# Patient Record
Sex: FEMALE | Race: WHITE | NOT HISPANIC OR LATINO | Employment: FULL TIME | URBAN - METROPOLITAN AREA
[De-identification: names, ages, dates, MRNs, and addresses within clinical notes are randomized per-mention and may not be internally consistent; named-entity substitution may affect disease eponyms.]

---

## 2023-03-20 ENCOUNTER — TELEMEDICINE (OUTPATIENT)
Dept: PHARMACY | Facility: HOSPITAL | Age: 43
End: 2023-03-20
Payer: COMMERCIAL

## 2023-03-20 DIAGNOSIS — E66.9 CLASS 1 OBESITY WITHOUT SERIOUS COMORBIDITY WITH BODY MASS INDEX (BMI) OF 34.0 TO 34.9 IN ADULT, UNSPECIFIED OBESITY TYPE: Primary | ICD-10-CM

## 2023-03-20 DIAGNOSIS — E66.9 CLASS 1 OBESITY WITHOUT SERIOUS COMORBIDITY WITH BODY MASS INDEX (BMI) OF 34.0 TO 34.9 IN ADULT, UNSPECIFIED OBESITY TYPE: ICD-10-CM

## 2023-03-20 PROBLEM — E66.811 CLASS 1 OBESITY WITHOUT SERIOUS COMORBIDITY WITH BODY MASS INDEX (BMI) OF 34.0 TO 34.9 IN ADULT: Status: ACTIVE | Noted: 2023-03-20

## 2023-03-20 RX ORDER — SEMAGLUTIDE 0.5 MG/.5ML
0.5 INJECTION, SOLUTION SUBCUTANEOUS
COMMUNITY
Start: 2023-02-27 | End: 2023-03-20 | Stop reason: ALTCHOICE

## 2023-03-20 RX ORDER — DROSPIRENONE AND ETHINYL ESTRADIOL 0.03MG-3MG
1 KIT ORAL DAILY
COMMUNITY
Start: 2015-08-31 | End: 2023-12-05 | Stop reason: ALTCHOICE

## 2023-03-20 RX ORDER — SEMAGLUTIDE 1 MG/.5ML
1 INJECTION, SOLUTION SUBCUTANEOUS
Qty: 2 ML | Refills: 1 | Status: SHIPPED | OUTPATIENT
Start: 2023-03-20 | End: 2023-04-18 | Stop reason: ALTCHOICE

## 2023-03-20 RX ORDER — ASPIRIN 81 MG/1
1 TABLET ORAL DAILY
Status: ON HOLD | COMMUNITY
Start: 2017-12-20 | End: 2024-01-09 | Stop reason: SDUPTHER

## 2023-03-20 NOTE — PROGRESS NOTES
Subjective   Patient ID: Iris Barth is a 42 y.o. female who presents for Weight Loss.    Referring Provider: Roxann Basurto DO     Ms. Barth returns to the pharmacy team for weight loss medication review. At initial pharmacy visit, patient was a candidate for Wegovy therapy after insurance approved prior authorization and review of her vitals showed BMI > 30. Patient has been able to tolerate 2 weeks of Wegovy 0.5 mg without side effects or concerns, due for 3rd dose tomorrow. She has noticed 18 lbs of weight loss since starting Wegovy. She is requesting further titration today to the 1 mg weekly dose. She is concerned about supply of medication and inquires to get RX sooner if possible. No other concerns at this time.     Review of Systems   All other systems reviewed and are negative.      Objective     There were no vitals taken for this visit.     Labs  Lab Results   Component Value Date    BILITOT 0.3 02/06/2023    CALCIUM 8.9 02/06/2023    CO2 22 02/06/2023     02/06/2023    CREATININE 0.74 02/06/2023    GLUCOSE 90 02/06/2023    ALKPHOS 59 02/06/2023    K 3.8 02/06/2023    PROT 6.9 02/06/2023     02/06/2023    AST 13 02/06/2023    ALT 8 02/06/2023    BUN 13 02/06/2023    ANIONGAP 14 02/06/2023    ALBUMIN 3.9 02/06/2023    GFRF >90 02/06/2023     Lab Results   Component Value Date    TRIG 107 02/06/2023    CHOL 216 (H) 02/06/2023    HDL 88.8 02/06/2023     Lab Results   Component Value Date    HGBA1C 5.1 02/06/2023       Assessment/Plan   Problem List Items Addressed This Visit          Endocrine/Metabolic    Class 1 obesity without serious comorbidity with body mass index (BMI) of 34.0 to 34.9 in adult   1. Increase taking Wegovy to 1 mg SQ once weekly   2. Continue all other meds as prescribed  3. Pharmacy to reach out again in 4 weeks for dose titration and tolerance to therapy       Clinical Pharmacist Follow-Up Date: In 4 weeks or as needed for clinical intervention   Type of  encounter: Virtual     Provided counseling on lifestyle modifications, medications, and self-monitoring. Patient has no additional questions at this time. PCP was tasked if any medication or lab changes/recommendations need made.     Terrence Olvera, PharmD, BCPS

## 2023-04-18 ENCOUNTER — TELEMEDICINE (OUTPATIENT)
Dept: PHARMACY | Facility: HOSPITAL | Age: 43
End: 2023-04-18
Payer: COMMERCIAL

## 2023-04-18 DIAGNOSIS — E66.9 CLASS 1 OBESITY WITHOUT SERIOUS COMORBIDITY WITH BODY MASS INDEX (BMI) OF 34.0 TO 34.9 IN ADULT, UNSPECIFIED OBESITY TYPE: ICD-10-CM

## 2023-04-18 RX ORDER — SEMAGLUTIDE 1.7 MG/.75ML
1.7 INJECTION, SOLUTION SUBCUTANEOUS
Qty: 2 ML | Refills: 2 | Status: SHIPPED | OUTPATIENT
Start: 2023-04-18 | End: 2023-05-16 | Stop reason: ALTCHOICE

## 2023-04-18 NOTE — PROGRESS NOTES
Subjective   Patient ID: Iris Barth is a 42 y.o. female who presents for Weight Loss.    Referring Provider: Roxann Basurto DO     Ms. Barth returns to the pharmacy team for weight loss medication review. At initial pharmacy visit, patient was a candidate for Wegovy therapy after insurance approved prior authorization and review of her vitals showed BMI > 30. Patient has been able to tolerate 2 weeks of Wegovy 1 mg without side effects or concerns, due for 3rd dose tomorrow. She has noticed 18+ lbs of weight loss since starting Wegovy. She is requesting further titration today to the 1.7 mg weekly dose. She is concerned about supply of medication and inquires to get RX sooner if possible. No other concerns at this time.     Objective     There were no vitals taken for this visit.     Labs  Lab Results   Component Value Date    BILITOT 0.3 02/06/2023    CALCIUM 8.9 02/06/2023    CO2 22 02/06/2023     02/06/2023    CREATININE 0.74 02/06/2023    GLUCOSE 90 02/06/2023    ALKPHOS 59 02/06/2023    K 3.8 02/06/2023    PROT 6.9 02/06/2023     02/06/2023    AST 13 02/06/2023    ALT 8 02/06/2023    BUN 13 02/06/2023    ANIONGAP 14 02/06/2023    ALBUMIN 3.9 02/06/2023    GFRF >90 02/06/2023     Lab Results   Component Value Date    TRIG 107 02/06/2023    CHOL 216 (H) 02/06/2023    HDL 88.8 02/06/2023     Lab Results   Component Value Date    HGBA1C 5.1 02/06/2023     Current Outpatient Medications on File Prior to Visit   Medication Sig Dispense Refill    aspirin 81 mg EC tablet Take 1 tablet (81 mg) by mouth once daily.      Ocella 3-0.03 mg tablet Take 1 tablet by mouth once daily.      [DISCONTINUED] semaglutide, weight loss, (Wegovy) 1 mg/0.5 mL pen injector Inject 1 mg under the skin every 7 days. 2 mL 1     No current facility-administered medications on file prior to visit.       Assessment/Plan   Problem List Items Addressed This Visit       Class 1 obesity without serious comorbidity with body  mass index (BMI) of 34.0 to 34.9 in adult   1. Increase taking Wegovy to 1.7 mg SQ once weekly   2. Continue all other meds as prescribed  3. Pharmacy to reach out again in 4 weeks for dose titration and tolerance to therapy         Clinical Pharmacist Follow-Up Date: In 4 weeks or as needed for clinical intervention   Type of encounter: Virtual      Provided counseling on lifestyle modifications, medications, and self-monitoring. Patient has no additional questions at this time. PCP was tasked if any medication or lab changes/recommendations need made.      Terrence Olvera, PharmD, BCPS

## 2023-05-16 ENCOUNTER — TELEMEDICINE (OUTPATIENT)
Dept: PHARMACY | Facility: HOSPITAL | Age: 43
End: 2023-05-16
Payer: COMMERCIAL

## 2023-05-16 DIAGNOSIS — E66.9 CLASS 1 OBESITY WITHOUT SERIOUS COMORBIDITY WITH BODY MASS INDEX (BMI) OF 34.0 TO 34.9 IN ADULT, UNSPECIFIED OBESITY TYPE: ICD-10-CM

## 2023-05-16 RX ORDER — SEMAGLUTIDE 2.4 MG/.75ML
2.4 INJECTION, SOLUTION SUBCUTANEOUS
Qty: 6 ML | Refills: 0 | Status: SHIPPED | OUTPATIENT
Start: 2023-05-16 | End: 2023-06-13 | Stop reason: SDUPTHER

## 2023-05-16 NOTE — PROGRESS NOTES
Subjective   Patient ID: Iris Barth is a 42 y.o. female who presents for No chief complaint on file..    Referring Provider: Roxann Basurto DO     Ms. Barth returns to the pharmacy team for weight loss medication review. At initial pharmacy visit, patient was a candidate for Wegovy therapy after insurance approved prior authorization and review of her vitals showed BMI > 30. Patient has been able to tolerate 2 weeks of Wegovy 1.7 mg, this time felt mild nausea for one week but resolved and now is without side effects or concerns, due for 3rd dose tomorrow. She has noticed 30 lbs of weight loss since starting Wegovy. She is requesting further titration today to the 2.4 mg weekly dose. She is concerned about supply of medication and inquires to get RX sooner if possible. No other concerns at this time.      Objective     There were no vitals taken for this visit.     Labs  Lab Results   Component Value Date    BILITOT 0.3 02/06/2023    CALCIUM 8.9 02/06/2023    CO2 22 02/06/2023     02/06/2023    CREATININE 0.74 02/06/2023    GLUCOSE 90 02/06/2023    ALKPHOS 59 02/06/2023    K 3.8 02/06/2023    PROT 6.9 02/06/2023     02/06/2023    AST 13 02/06/2023    ALT 8 02/06/2023    BUN 13 02/06/2023    ANIONGAP 14 02/06/2023    ALBUMIN 3.9 02/06/2023    GFRF >90 02/06/2023     Lab Results   Component Value Date    TRIG 107 02/06/2023    CHOL 216 (H) 02/06/2023    HDL 88.8 02/06/2023     Lab Results   Component Value Date    HGBA1C 5.1 02/06/2023       Current Outpatient Medications on File Prior to Visit   Medication Sig Dispense Refill    aspirin 81 mg EC tablet Take 1 tablet (81 mg) by mouth once daily.      Ocella 3-0.03 mg tablet Take 1 tablet by mouth once daily.      semaglutide, weight loss, (Wegovy) 1.7 mg/0.75 mL pen injector Inject 1.7 mg under the skin every 7 days. 2 mL 2     No current facility-administered medications on file prior to visit.        Assessment/Plan   Problem List Items  Addressed This Visit       Class 1 obesity without serious comorbidity with body mass index (BMI) of 34.0 to 34.9 in adult   1. Increase taking Wegovy to 2.4 mg SQ once weekly   2. Continue all other meds as prescribed  3. Pharmacy to reach out again in 4 weeks for tolerance to therapy         Clinical Pharmacist Follow-Up Date: In 4 weeks or as needed for clinical intervention   Type of encounter: Virtual      Provided counseling on lifestyle modifications, medications, and self-monitoring. Patient has no additional questions at this time. PCP was tasked if any medication or lab changes/recommendations need made.      Terrence Olvera, PharmD, BCPS

## 2023-06-13 ENCOUNTER — TELEMEDICINE (OUTPATIENT)
Dept: PHARMACY | Facility: HOSPITAL | Age: 43
End: 2023-06-13
Payer: COMMERCIAL

## 2023-06-13 DIAGNOSIS — E66.9 CLASS 1 OBESITY WITHOUT SERIOUS COMORBIDITY WITH BODY MASS INDEX (BMI) OF 34.0 TO 34.9 IN ADULT, UNSPECIFIED OBESITY TYPE: Primary | ICD-10-CM

## 2023-06-13 DIAGNOSIS — E66.9 CLASS 1 OBESITY WITHOUT SERIOUS COMORBIDITY WITH BODY MASS INDEX (BMI) OF 34.0 TO 34.9 IN ADULT, UNSPECIFIED OBESITY TYPE: ICD-10-CM

## 2023-06-13 RX ORDER — SEMAGLUTIDE 2.4 MG/.75ML
2.4 INJECTION, SOLUTION SUBCUTANEOUS
Qty: 9 ML | Refills: 1 | Status: SHIPPED | OUTPATIENT
Start: 2023-06-13 | End: 2023-08-14 | Stop reason: SDUPTHER

## 2023-06-13 NOTE — PROGRESS NOTES
Subjective   Patient ID: Iris Barth is a 42 y.o. female who presents for Weight Loss.    Referring Provider: Roxann Basurto DO     Ms. Barth returns to the pharmacy team for weight loss medication review. At initial pharmacy visit, patient was a candidate for Wegovy therapy after insurance approved prior authorization and review of her vitals showed BMI > 30. Patient has been able to tolerate 2 weeks of Wegovy 2.4 mg, due for 3rd dose tomorrow. She has noticed 30 lbs of weight loss since starting Wegovy. She is requesting further continuation today of the 2.4 mg weekly dose. She is concerned about supply of medication and inquires to get RX sooner if possible. No other concerns at this time.         Objective     There were no vitals taken for this visit.     Labs  Lab Results   Component Value Date    BILITOT 0.3 02/06/2023    CALCIUM 8.9 02/06/2023    CO2 22 02/06/2023     02/06/2023    CREATININE 0.74 02/06/2023    GLUCOSE 90 02/06/2023    ALKPHOS 59 02/06/2023    K 3.8 02/06/2023    PROT 6.9 02/06/2023     02/06/2023    AST 13 02/06/2023    ALT 8 02/06/2023    BUN 13 02/06/2023    ANIONGAP 14 02/06/2023    ALBUMIN 3.9 02/06/2023    GFRF >90 02/06/2023     Lab Results   Component Value Date    TRIG 107 02/06/2023    CHOL 216 (H) 02/06/2023    HDL 88.8 02/06/2023     Lab Results   Component Value Date    HGBA1C 5.1 02/06/2023       Current Outpatient Medications on File Prior to Visit   Medication Sig Dispense Refill    aspirin 81 mg EC tablet Take 1 tablet (81 mg) by mouth once daily.      Ocella 3-0.03 mg tablet Take 1 tablet by mouth once daily.      semaglutide, weight loss, (Wegovy) 2.4 mg/0.75 mL pen injector Inject 2.4 mg under the skin every 7 days. 6 mL 0     No current facility-administered medications on file prior to visit.        Assessment/Plan   Problem List Items Addressed This Visit       Class 1 obesity without serious comorbidity with body mass index (BMI) of 34.0 to 34.9  in adult   1. Continue taking Wegovy 2.4 mg SQ once weekly   2. Continue all other meds as prescribed  3. Pharmacy to reach out as needed for therapy questions         Clinical Pharmacist Follow-Up Date: prn  Type of encounter: Virtual      Provided counseling on lifestyle modifications, medications, and self-monitoring. Patient has no additional questions at this time. PCP was tasked if any medication or lab changes/recommendations need made.      Terrence Olvera, PharmD, BCPS

## 2023-08-14 ENCOUNTER — TELEMEDICINE (OUTPATIENT)
Dept: PHARMACY | Facility: HOSPITAL | Age: 43
End: 2023-08-14
Payer: COMMERCIAL

## 2023-08-14 DIAGNOSIS — E66.9 CLASS 1 OBESITY WITHOUT SERIOUS COMORBIDITY WITH BODY MASS INDEX (BMI) OF 34.0 TO 34.9 IN ADULT, UNSPECIFIED OBESITY TYPE: Primary | ICD-10-CM

## 2023-08-14 DIAGNOSIS — E66.9 CLASS 1 OBESITY WITHOUT SERIOUS COMORBIDITY WITH BODY MASS INDEX (BMI) OF 34.0 TO 34.9 IN ADULT, UNSPECIFIED OBESITY TYPE: ICD-10-CM

## 2023-08-14 RX ORDER — SEMAGLUTIDE 2.4 MG/.75ML
2.4 INJECTION, SOLUTION SUBCUTANEOUS
Qty: 9 ML | Refills: 1 | Status: SHIPPED | OUTPATIENT
Start: 2023-08-14 | End: 2023-12-11 | Stop reason: SDUPTHER

## 2023-08-14 NOTE — PROGRESS NOTES
"Subjective   Patient ID: Iris Barth is a 42 y.o. female who presents for No chief complaint on file..    Referring Provider: Roxann Basurto DO     Ms. Barth returns to the pharmacy team for weight loss medication review. At initial pharmacy visit, patient was a candidate for Wegovy therapy after insurance approved prior authorization and review of her vitals showed BMI > 30. Patient has been able to tolerate Wegovy 2.4 mg for the past few months. She has noticed 40 lbs of weight loss since starting Wegovy. She is requesting further continuation today of the 2.4 mg weekly dose and PA renewal. No other concerns at this time.         Objective         2/6/2023     8:38 AM   Vitals   Systolic 152   Diastolic 78   Heart Rate 80   Height (in) 1.651 m (5' 5\")   Weight (lb) 210.4   BMI 35.01 kg/m2   BSA (m2) 2.09 m2         Labs  Lab Results   Component Value Date    BILITOT 0.3 02/06/2023    CALCIUM 8.9 02/06/2023    CO2 22 02/06/2023     02/06/2023    CREATININE 0.74 02/06/2023    GLUCOSE 90 02/06/2023    ALKPHOS 59 02/06/2023    K 3.8 02/06/2023    PROT 6.9 02/06/2023     02/06/2023    AST 13 02/06/2023    ALT 8 02/06/2023    BUN 13 02/06/2023    ANIONGAP 14 02/06/2023    ALBUMIN 3.9 02/06/2023    GFRF >90 02/06/2023     Lab Results   Component Value Date    TRIG 107 02/06/2023    CHOL 216 (H) 02/06/2023    HDL 88.8 02/06/2023     Lab Results   Component Value Date    HGBA1C 5.1 02/06/2023       Current Outpatient Medications on File Prior to Visit   Medication Sig Dispense Refill    aspirin 81 mg EC tablet Take 1 tablet (81 mg) by mouth once daily.      Ocella 3-0.03 mg tablet Take 1 tablet by mouth once daily.      semaglutide, weight loss, (Wegovy) 2.4 mg/0.75 mL pen injector Inject 2.4 mg under the skin every 7 days. 9 mL 1     No current facility-administered medications on file prior to visit.        Assessment/Plan   Problem List Items Addressed This Visit       Class 1 obesity without " serious comorbidity with body mass index (BMI) of 34.0 to 34.9 in adult   1. Continue taking Wegovy 2.4 mg SQ once weekly   2. Continue all other meds as prescribed  3. Pharmacy to reach out as needed for therapy questions   4. Prior Auth submitted 8/14 and approved until 3/11/2024   5. Refill for wegovy submitted today        Clinical Pharmacist Follow-Up Date: prn  Type of encounter: Virtual      Provided counseling on lifestyle modifications, medications, and self-monitoring. Patient has no additional questions at this time. PCP was tasked if any medication or lab changes/recommendations need made.      Terrence Olvera, PharmD, BCPS

## 2023-10-20 ENCOUNTER — ANCILLARY PROCEDURE (OUTPATIENT)
Dept: RADIOLOGY | Facility: CLINIC | Age: 43
End: 2023-10-20
Payer: COMMERCIAL

## 2023-10-20 DIAGNOSIS — Z12.31 ENCOUNTER FOR SCREENING MAMMOGRAM FOR MALIGNANT NEOPLASM OF BREAST: ICD-10-CM

## 2023-10-20 DIAGNOSIS — R92.8 ABNORMAL MAMMOGRAM OF BOTH BREASTS: ICD-10-CM

## 2023-10-20 PROCEDURE — 77063 BREAST TOMOSYNTHESIS BI: CPT

## 2023-10-20 PROCEDURE — 77067 SCR MAMMO BI INCL CAD: CPT | Mod: BILATERAL PROCEDURE | Performed by: RADIOLOGY

## 2023-10-20 PROCEDURE — 77063 BREAST TOMOSYNTHESIS BI: CPT | Mod: BILATERAL PROCEDURE | Performed by: RADIOLOGY

## 2023-10-30 ENCOUNTER — TELEMEDICINE (OUTPATIENT)
Dept: PRIMARY CARE | Facility: CLINIC | Age: 43
End: 2023-10-30
Payer: COMMERCIAL

## 2023-10-30 DIAGNOSIS — R92.8 ABNORMAL MAMMOGRAM: Primary | ICD-10-CM

## 2023-10-30 PROBLEM — M25.569 ARTHRALGIA OF LOWER LEG: Status: ACTIVE | Noted: 2021-09-30

## 2023-10-30 PROBLEM — B34.9 ACUTE VIRAL SYNDROME: Status: ACTIVE | Noted: 2023-10-30

## 2023-10-30 PROBLEM — F41.9 ANXIETY: Status: ACTIVE | Noted: 2023-10-30

## 2023-10-30 PROBLEM — M22.40 CHONDROMALACIA OF PATELLA: Status: ACTIVE | Noted: 2018-04-25

## 2023-10-30 PROBLEM — J10.1 INFLUENZA A: Status: ACTIVE | Noted: 2023-10-30

## 2023-10-30 PROBLEM — J01.10 ACUTE FRONTAL SINUSITIS: Status: ACTIVE | Noted: 2023-10-30

## 2023-10-30 PROBLEM — M25.569 KNEE PAIN: Status: ACTIVE | Noted: 2019-01-29

## 2023-10-30 PROCEDURE — 99214 OFFICE O/P EST MOD 30 MIN: CPT | Performed by: FAMILY MEDICINE

## 2023-10-30 ASSESSMENT — ENCOUNTER SYMPTOMS
COLOR CHANGE: 0
APPETITE CHANGE: 0
FATIGUE: 0
HEADACHES: 0
CHEST TIGHTNESS: 0
ACTIVITY CHANGE: 0
FEVER: 0
COUGH: 0
FACIAL ASYMMETRY: 0
DIZZINESS: 0
BACK PAIN: 0
LIGHT-HEADEDNESS: 0
ARTHRALGIAS: 0
PALPITATIONS: 0
CHOKING: 0

## 2023-10-30 NOTE — PROGRESS NOTES
Subjective   Patient ID: Iris Barth is a 43 y.o. female who presents for To discuss recent mammogram results. .    HPI     Review of Systems   Constitutional:  Negative for activity change, appetite change, fatigue and fever.   HENT:  Negative for congestion.    Respiratory:  Negative for cough, choking and chest tightness.    Cardiovascular:  Negative for chest pain, palpitations and leg swelling.   Musculoskeletal:  Negative for arthralgias, back pain and gait problem.   Skin:  Negative for color change and pallor.   Neurological:  Negative for dizziness, facial asymmetry, light-headedness and headaches.       Objective   There were no vitals taken for this visit.  BSA There is no height or weight on file to calculate BSA.      Physical Exam  Constitutional:       General: She is not in acute distress.     Appearance: Normal appearance. She is not toxic-appearing.   HENT:      Head: Normocephalic.      Right Ear: Tympanic membrane, ear canal and external ear normal.      Left Ear: Tympanic membrane, ear canal and external ear normal.      Nose: Nose normal.      Mouth/Throat:      Pharynx: Oropharynx is clear.   Eyes:      Conjunctiva/sclera: Conjunctivae normal.      Pupils: Pupils are equal, round, and reactive to light.   Cardiovascular:      Rate and Rhythm: Normal rate and regular rhythm.      Pulses: Normal pulses.      Heart sounds: Normal heart sounds.   Pulmonary:      Effort: No respiratory distress.      Breath sounds: No wheezing, rhonchi or rales.   Abdominal:      General: Bowel sounds are normal. There is no distension.      Palpations: Abdomen is soft.      Tenderness: There is no abdominal tenderness.   Musculoskeletal:         General: No swelling or tenderness.      Cervical back: No tenderness.   Skin:     Findings: No lesion or rash.   Neurological:      General: No focal deficit present.      Mental Status: She is alert and oriented to person, place, and time. Mental status is at  baseline.      Gait: Gait normal.   Psychiatric:         Mood and Affect: Mood normal.         Behavior: Behavior normal.         Thought Content: Thought content normal.         Judgment: Judgment normal.       [facility] Encounter on 02/06/2023   Component Date Value Ref Range Status    Cholesterol 02/06/2023 216 (H)  0 - 199 mg/dL Final    Comment: .      AGE      DESIRABLE   BORDERLINE HIGH   HIGH     0-19 Y     0 - 169       170 - 199     >/= 200    20-24 Y     0 - 189       190 - 224     >/= 225         >24 Y     0 - 199       200 - 239     >/= 240   **All ranges are based on fasting samples. Specific   therapeutic targets will vary based on patient-specific   cardiac risk.  .   Pediatric guidelines reference:Pediatrics 2011, 128(S5).   Adult guidelines reference: NCEP ATPIII Guidelines,     PABLITO 2001, 258:2486-97  .   Venipuncture immediately after or during the    administration of Metamizole may lead to falsely   low results. Testing should be performed immediately   prior to Metamizole dosing.      HDL 02/06/2023 88.8  mg/dL Final    Comment: .      AGE      VERY LOW   LOW     NORMAL    HIGH       0-19 Y       < 35   < 40     40-45     ----    20-24 Y       ----   < 40       >45     ----      >24 Y       ----   < 40     40-60      >60  .      Cholesterol/HDL Ratio 02/06/2023 2.4   Final    Comment: REF VALUES  DESIRABLE  < 3.4  HIGH RISK  > 5.0      LDL 02/06/2023 106 (H)  0 - 99 mg/dL Final    Comment: .                           NEAR      BORD      AGE      DESIRABLE  OPTIMAL    HIGH     HIGH     VERY HIGH     0-19 Y     0 - 109     ---    110-129   >/= 130     ----    20-24 Y     0 - 119     ---    120-159   >/= 160     ----      >24 Y     0 -  99   100-129  130-159   160-189     >/=190  .      VLDL 02/06/2023 21  0 - 40 mg/dL Final    Triglycerides 02/06/2023 107  0 - 149 mg/dL Final    Comment: .      AGE      DESIRABLE   BORDERLINE HIGH   HIGH     VERY HIGH   0 D-90 D    19 - 174         ----          ----        ----  91 D- 9 Y     0 -  74        75 -  99     >/= 100      ----    10-19 Y     0 -  89        90 - 129     >/= 130      ----    20-24 Y     0 - 114       115 - 149     >/= 150      ----         >24 Y     0 - 149       150 - 199    200- 499    >/= 500  .   Venipuncture immediately after or during the    administration of Metamizole may lead to falsely   low results. Testing should be performed immediately   prior to Metamizole dosing.      TSH 02/06/2023 2.04  0.44 - 3.98 mIU/L Final    Comment:  TSH testing is performed using different testing    methodology at [facility] than at other    [facility]. Direct result comparisons should    only be made within the same method.      Vitamin D, 25-Hydroxy 02/06/2023 70  ng/mL Final    Comment: .  DEFICIENCY:         < 20   NG/ML  INSUFFICIENCY:      20-29  NG/ML  SUFFICIENCY:         NG/ML    THIS ASSAY ACCURATELY QUANTIFIES THE SUM OF  VITAMIN D3, 25-HYDROXY AND VIT D2,25-HYDROXY.      WBC 02/06/2023 7.0  4.4 - 11.3 x10E9/L Final    nRBC 02/06/2023 0.0  0.0 - 0.0 /100 WBC Final    RBC 02/06/2023 4.08  4.00 - 5.20 x10E12/L Final    Hemoglobin 02/06/2023 12.3  12.0 - 16.0 g/dL Final    Hematocrit 02/06/2023 37.0  36.0 - 46.0 % Final    MCV 02/06/2023 91  80 - 100 fL Final    MCHC 02/06/2023 33.2  32.0 - 36.0 g/dL Final    Platelets 02/06/2023 357  150 - 450 x10E9/L Final    RDW 02/06/2023 13.2  11.5 - 14.5 % Final    Neutrophils % 02/06/2023 58.1  40.0 - 80.0 % Final    Immature Granulocytes %, Automated 02/06/2023 0.1  0.0 - 0.9 % Final    Comment:  Immature Granulocyte Count (IG) includes promyelocytes,    myelocytes and metamyelocytes but does not include bands.   Percent differential counts (%) should be interpreted in the   context of the absolute cell counts (cells/L).      Lymphocytes % 02/06/2023 34.4  13.0 - 44.0 % Final    Monocytes % 02/06/2023 6.4  2.0 - 10.0 % Final    Eosinophils % 02/06/2023 0.7  0.0 - 6.0 % Final     Basophils % 02/06/2023 0.3  0.0 - 2.0 % Final    Neutrophils Absolute 02/06/2023 4.06  1.20 - 7.70 x10E9/L Final    Lymphocytes Absolute 02/06/2023 2.41  1.20 - 4.80 x10E9/L Final    Monocytes Absolute 02/06/2023 0.45  0.10 - 1.00 x10E9/L Final    Eosinophils Absolute 02/06/2023 0.05  0.00 - 0.70 x10E9/L Final    Basophils Absolute 02/06/2023 0.02  0.00 - 0.10 x10E9/L Final    Hemoglobin A1C 02/06/2023 5.1  % Final    Comment:      Diagnosis of Diabetes-Adults   Non-Diabetic: < or = 5.6%   Increased risk for developing diabetes: 5.7-6.4%   Diagnostic of diabetes: > or = 6.5%  .       Monitoring of Diabetes                Age (y)     Therapeutic Goal (%)   Adults:          >18           <7.0   Pediatrics:    13-18           <7.5                   7-12           <8.0                   0- 6            7.5-8.5   American Diabetes Association. Diabetes Care 33(S1), Jan 2010.      Estimated Average Glucose 02/06/2023 100  MG/DL Final    Glucose 02/06/2023 90  74 - 99 mg/dL Final    Sodium 02/06/2023 139  136 - 145 mmol/L Final    Potassium 02/06/2023 3.8  3.5 - 5.3 mmol/L Final    Chloride 02/06/2023 107  98 - 107 mmol/L Final    Bicarbonate 02/06/2023 22  21 - 32 mmol/L Final    Anion Gap 02/06/2023 14  10 - 20 mmol/L Final    Urea Nitrogen 02/06/2023 13  6 - 23 mg/dL Final    Creatinine 02/06/2023 0.74  0.50 - 1.05 mg/dL Final    GFR Female 02/06/2023 >90  >90 mL/min/1.73m2 Final    Comment:  CALCULATIONS OF ESTIMATED GFR ARE PERFORMED   USING THE 2021 CKD-EPI STUDY REFIT EQUATION   WITHOUT THE RACE VARIABLE FOR THE IDMS-TRACEABLE   CREATININE METHODS.    https://jasn.asnjournals.org/content/early/2021/09/22/ASN.8242680839      Calcium 02/06/2023 8.9  8.6 - 10.6 mg/dL Final    Albumin 02/06/2023 3.9  3.4 - 5.0 g/dL Final    Alkaline Phosphatase 02/06/2023 59  33 - 110 U/L Final    Total Protein 02/06/2023 6.9  6.4 - 8.2 g/dL Final    AST 02/06/2023 13  9 - 39 U/L Final    Total Bilirubin 02/06/2023 0.3  0.0 - 1.2 mg/dL  Final    ALT (SGPT) 02/06/2023 8  7 - 45 U/L Final    Comment:  Patients treated with Sulfasalazine may generate    falsely decreased results for ALT.       Current Outpatient Medications on File Prior to Visit   Medication Sig Dispense Refill    aspirin 81 mg EC tablet Take 1 tablet (81 mg) by mouth once daily.      Ocella 3-0.03 mg tablet Take 1 tablet by mouth once daily.      semaglutide, weight loss, (Wegovy) 2.4 mg/0.75 mL pen injector Inject 2.4 mg under the skin every 7 days. 9 mL 1     No current facility-administered medications on file prior to visit.     No images are attached to the encounter.            Assessment/Plan   Diagnoses and all orders for this visit:  Abnormal mammogram    Patient to follow up with the US  Patient to call if questions or concerns

## 2023-11-03 ENCOUNTER — ANCILLARY PROCEDURE (OUTPATIENT)
Dept: RADIOLOGY | Facility: CLINIC | Age: 43
End: 2023-11-03
Payer: COMMERCIAL

## 2023-11-03 DIAGNOSIS — R92.8 ABNORMAL MAMMOGRAM OF BOTH BREASTS: ICD-10-CM

## 2023-11-03 PROCEDURE — 76642 ULTRASOUND BREAST LIMITED: CPT | Mod: BILATERAL PROCEDURE | Performed by: RADIOLOGY

## 2023-11-03 PROCEDURE — 76642 ULTRASOUND BREAST LIMITED: CPT | Mod: 50

## 2023-11-07 PROBLEM — N63.20 MASS OF LEFT BREAST: Status: ACTIVE | Noted: 2023-11-07

## 2023-11-07 PROBLEM — N63.10 MASS OF RIGHT BREAST: Status: ACTIVE | Noted: 2023-11-07

## 2023-11-07 PROBLEM — R92.8 ABNORMAL FINDING ON BREAST IMAGING: Status: ACTIVE | Noted: 2023-11-07

## 2023-11-07 NOTE — PROGRESS NOTES
Iris Barth female   1980 43 y.o.  [id number]      Chief Complaint  New patient, biopsy consultation.    History Of Present Illness  Iris Barth is a very pleasant 43 y.o.  female seen in the breast center for biopsy consultation. She denies breast surgery or biopsy. She has family history of breast cancer in her maternal grandmother.    BREAST IMAGIN/3/2023 Bilateral breast ultrasound, indicates BI-RADS Category 4. Left breast suspicious masses, bilateral indeterminate masses, and indeterminate left axillary lymph nodes. Further ultrasound examination with elastography of bilateral breasts as well as ultrasound guided core needle biopsy are recommended.    REPRODUCTIVE HISTORY: menarche age 12, , first birth age 25,  x 1 year, OCP's x 12 years, premenopausal, LMP no periods on continual birth control, scattered fibroglandular tissue                                FAMILY CANCER HISTORY:   Maternal Grandmother: Breast cancer, unknown age  Maternal Grandfather: Unknown cancer    Review of Systems  Constitutional:  Negative for appetite change, fatigue, fever and unexpected weight change.   HENT:  Negative for ear pain, hearing loss, nosebleeds, sore throat and trouble swallowing.    Eyes:  Negative for discharge, itching and visual disturbance.   Breast: As stated in HPI.  Respiratory:  Negative for cough, chest tightness and shortness of breath.    Cardiovascular:  Negative for chest pain, palpitations and leg swelling.   Gastrointestinal:  Negative for abdominal pain, constipation, diarrhea and nausea.   Endocrine: Negative for cold intolerance and heat intolerance.   Genitourinary:  Negative for dysuria, frequency, hematuria, pelvic pain and vaginal bleeding.   Musculoskeletal:  Negative for arthralgias, back pain, gait problem, joint swelling and myalgias.   Skin:  Negative for color change and rash.   Allergic/Immunologic: Negative for environmental allergies and food  allergies.   Neurological:  Negative for dizziness, tremors, speech difficulty, weakness, numbness and headaches.   Hematological:  Does not bruise/bleed easily.   Psychiatric/Behavioral:  Negative for agitation, dysphoric mood and sleep disturbance. The patient is not nervous/anxious.       Past Medical History  She has no past medical history on file.    Surgical History  She has a past surgical history that includes Knee surgery (08/31/2015).    Family History  Cancer-related family history includes Breast cancer in her maternal grandmother and paternal grandmother.     Social History  She reports that she has never smoked. She has never been exposed to tobacco smoke. She has never used smokeless tobacco. Alcohol use questions deferred to the physician. Drug use questions deferred to the physician.    Allergies  Patient has no known allergies.    Medications  Current Outpatient Medications   Medication Instructions    aspirin 81 mg EC tablet 1 tablet, oral, Daily    Ocella 3-0.03 mg tablet 1 tablet, oral, Daily    Wegovy 2.4 mg, subcutaneous, Every 7 days       Last Recorded Vitals  Blood pressure (!) 145/95, pulse 87, temperature 36 °C (96.8 °F), weight 75.8 kg (167 lb).    Physical Exam  Patient is alert and oriented x3 and in a relaxed and appropriate mood. Her gait is steady and hand grasps are equal. Sclera is clear. The breasts are nearly symmetrical. The tissue is soft without palpable abnormalities, discrete nodules or masses. The skin and nipples appear normal. There is no cervical, supraclavicular or axillary lymphadenopathy. Heart rate and rhythm normal, S1 and S2 appreciated. The lungs are clear to auscultation bilaterally. Abdomen is soft and non-tender.      Relevant Results and Imaging  Study Result    Narrative & Impression   Interpreted By:  Shayla Barrera,   STUDY:  BI US BREAST LIMITED BILATERAL;  11/3/2023 9:18 am      ACCESSION NUMBER(S):  [id number]      ORDERING CLINICIAN:  QUINTON  BURNS      INDICATION:  Bilateral masses on screening mammogram.      COMPARISON:  10/20/2023      FINDINGS:  Targeted ultrasound examination of bilateral breasts was performed.      Within the right breast, at 12:00 position 6 cm from nipple, an  indeterminate heterogeneous mass measuring 0.5 x 0.5 x 0.4 cm is  identified. There is no internal vascularity. This correlates with  the right mammographic finding.      Within the left breast, at 2:00 position 8 cm from nipple, an  indeterminate, partially circumscribed parallel hypoechoic mass  measuring 0.4 x 0.3 x 0.2 cm is identified. It demonstrates no  intervascular D. at 11:00 position 7 cm from pole, a suspicious  irregular hypoechoic mass measuring 0.9 x 0.4 x 0.4 cm is identified.  It demonstrates no internal vascularity. At 1:00 position 7 cm from  the nipple, a suspicious irregular non parallel shadowing mass is  identified, measuring 0.4 x 0.3 x 0.3 cm. These correlate with the  left mammographic findings.      Ultrasound examination of the left axilla demonstrates 3 borderline  lymph lymph nodes with cortex measuring approximately 0.3 cm in  thickness.      IMPRESSION:  Left breast suspicious masses, bilateral indeterminate masses, and  indeterminate left axillary lymph nodes. Further ultrasound  examination with elastography of bilateral breasts as well as  surgical consultation and ultrasound guided core needle biopsy are  recommended. Findings and recommendations were discussed with the  patient over the telephone by Dr. Barrera. A preprocedure form  was completed.      BI-RADS CATEGORY:      BI-RADS Category:  4 Suspicious.  Recommendation:  Biopsy.  Recommended Date:  Immediate.  Laterality:  Bilateral.         Signed by: Shayla Barrera 11/3/2023 5:31 PM     Time was spent viewing digital images. I explained the results in depth, along with suggested explanation for follow up recommendations based on the testing results. BI-RADS Category  4    Visit Diagnosis  1. Abnormal finding on breast imaging        2. Mass of right breast, unspecified quadrant        3. Mass of left breast, unspecified quadrant            Assessment/Plan  Abnormal mammogram, bilateral breast masses, borderline left axillary lymphadenopathy, no breast surgery or biopsy, no family history of breast cancer, scattered fibroglandular tissue    Plan:  Bilateral breast repeat ultrasound with left breast and axillary ultrasound guided core biopsy with possible right breast ultrasound guided core biopsy.    Patient Discussion/Summary  Bilateral breast repeat ultrasound with left breast and axillary ultrasound guided core biopsy with possible right breast ultrasound guided core biopsy. A breast radiology physician will perform the procedure. Possible diagnoses include benign, atypia or cancer. Bruising and mild discomfort after the biopsy is normal and will improve. I typically have results in 5-7 business days. I will call you with the results, please have your phone handy to take my call. I will provide recommendations for future follow up based on your biopsy results.     IMPORTANT INFORMATION REGARDING YOUR RESULTS    If you receive medical information from My [facility] Personal Health Record (online chart) your results will be released into your chart. This means you may view or see results of your biopsy or procedure before I contact you directly. If this occurs, please call the office and we will discuss your results over the phone.    You can see your health information, review clinical summaries from office visits & test results online when you follow your health with MY  Chart, a personal health record. To sign up go to [email or web address]. If you need assistance with signing up or trouble getting into your account call [facility] Patient Line 24/7 at [phone].    My office phone number is [phone]  if you need to get in touch with me or have additional questions  or concerns. Thank you for choosing [facility] and trusting me as your healthcare provider. I look forward to seeing you again at your next office visit. I am honored to be a provider on your health care team and I remain dedicated to helping you achieve your health goals.       Violeta Schroeder, CARMELITA-CNP

## 2023-11-08 PROBLEM — E66.9 OBESITY: Status: ACTIVE | Noted: 2023-11-08

## 2023-11-08 RX ORDER — SEMAGLUTIDE 0.5 MG/.5ML
0.5 INJECTION, SOLUTION SUBCUTANEOUS
COMMUNITY
End: 2023-11-09 | Stop reason: ALTCHOICE

## 2023-11-09 ENCOUNTER — APPOINTMENT (OUTPATIENT)
Dept: SURGICAL ONCOLOGY | Facility: HOSPITAL | Age: 43
End: 2023-11-09
Payer: COMMERCIAL

## 2023-11-09 ENCOUNTER — OFFICE VISIT (OUTPATIENT)
Dept: SURGICAL ONCOLOGY | Facility: HOSPITAL | Age: 43
End: 2023-11-09
Payer: COMMERCIAL

## 2023-11-09 ENCOUNTER — TELEPHONE (OUTPATIENT)
Dept: RADIOLOGY | Facility: CLINIC | Age: 43
End: 2023-11-09

## 2023-11-09 VITALS
SYSTOLIC BLOOD PRESSURE: 145 MMHG | TEMPERATURE: 96.8 F | BODY MASS INDEX: 27.79 KG/M2 | HEART RATE: 87 BPM | DIASTOLIC BLOOD PRESSURE: 95 MMHG | WEIGHT: 167 LBS

## 2023-11-09 DIAGNOSIS — N63.10 MASS OF RIGHT BREAST, UNSPECIFIED QUADRANT: ICD-10-CM

## 2023-11-09 DIAGNOSIS — R92.8 ABNORMAL FINDING ON BREAST IMAGING: Primary | ICD-10-CM

## 2023-11-09 DIAGNOSIS — N63.20 MASS OF LEFT BREAST, UNSPECIFIED QUADRANT: ICD-10-CM

## 2023-11-09 PROCEDURE — 3008F BODY MASS INDEX DOCD: CPT | Performed by: NURSE PRACTITIONER

## 2023-11-09 PROCEDURE — 99204 OFFICE O/P NEW MOD 45 MIN: CPT | Performed by: NURSE PRACTITIONER

## 2023-11-09 PROCEDURE — 99214 OFFICE O/P EST MOD 30 MIN: CPT | Performed by: NURSE PRACTITIONER

## 2023-11-09 ASSESSMENT — PAIN SCALES - GENERAL: PAINLEVEL: 0-NO PAIN

## 2023-11-10 ENCOUNTER — HOSPITAL ENCOUNTER (OUTPATIENT)
Dept: RADIOLOGY | Facility: HOSPITAL | Age: 43
Discharge: HOME | End: 2023-11-10
Payer: COMMERCIAL

## 2023-11-10 DIAGNOSIS — R92.8 ABNORMAL FINDING ON BREAST IMAGING: ICD-10-CM

## 2023-11-10 DIAGNOSIS — R92.8 OTHER ABNORMAL AND INCONCLUSIVE FINDINGS ON DIAGNOSTIC IMAGING OF BREAST: ICD-10-CM

## 2023-11-10 DIAGNOSIS — R92.8 ABNORMAL FINDING ON BREAST IMAGING: Primary | ICD-10-CM

## 2023-11-10 PROCEDURE — 2500000005 HC RX 250 GENERAL PHARMACY W/O HCPCS: Performed by: RADIOLOGY

## 2023-11-10 PROCEDURE — 19084 BX BREAST ADD LESION US IMAG: CPT | Mod: RIGHT SIDE | Performed by: RADIOLOGY

## 2023-11-10 PROCEDURE — 77061 BREAST TOMOSYNTHESIS UNI: CPT | Mod: RT

## 2023-11-10 PROCEDURE — 19282 PERQ DEVICE BREAST EA IMAG: CPT

## 2023-11-10 PROCEDURE — 77066 DX MAMMO INCL CAD BI: CPT | Mod: RIGHT SIDE | Performed by: RADIOLOGY

## 2023-11-10 PROCEDURE — 19081 BX BREAST 1ST LESION STRTCTC: CPT | Mod: RT

## 2023-11-10 PROCEDURE — 88305 TISSUE EXAM BY PATHOLOGIST: CPT | Performed by: PATHOLOGY

## 2023-11-10 PROCEDURE — 2720000007 HC OR 272 NO HCPCS

## 2023-11-10 PROCEDURE — 19081 BX BREAST 1ST LESION STRTCTC: CPT | Mod: RIGHT SIDE | Performed by: RADIOLOGY

## 2023-11-10 PROCEDURE — 19083 BX BREAST 1ST LESION US IMAG: CPT | Mod: RIGHT SIDE | Performed by: RADIOLOGY

## 2023-11-10 PROCEDURE — 77065 DX MAMMO INCL CAD UNI: CPT | Mod: LT

## 2023-11-10 PROCEDURE — 76642 ULTRASOUND BREAST LIMITED: CPT | Mod: 50

## 2023-11-10 PROCEDURE — 88360 TUMOR IMMUNOHISTOCHEM/MANUAL: CPT | Mod: TC,SUR | Performed by: NURSE PRACTITIONER

## 2023-11-10 PROCEDURE — 2780000003 HC OR 278 NO HCPCS

## 2023-11-10 PROCEDURE — 76642 ULTRASOUND BREAST LIMITED: CPT | Mod: BILATERAL PROCEDURE | Performed by: RADIOLOGY

## 2023-11-10 PROCEDURE — 88368 INSITU HYBRIDIZATION MANUAL: CPT | Performed by: PATHOLOGY

## 2023-11-10 PROCEDURE — A4648 IMPLANTABLE TISSUE MARKER: HCPCS

## 2023-11-10 PROCEDURE — 76982 USE 1ST TARGET LESION: CPT

## 2023-11-10 PROCEDURE — 88360 TUMOR IMMUNOHISTOCHEM/MANUAL: CPT | Performed by: PATHOLOGY

## 2023-11-10 PROCEDURE — 19083 BX BREAST 1ST LESION US IMAG: CPT | Mod: LT

## 2023-11-10 RX ADMIN — Medication 11 ML: at 11:40

## 2023-11-10 RX ADMIN — Medication 9.5 ML: at 12:50

## 2023-11-10 NOTE — Clinical Note
POSITIONED , LIODCAINE GIVEN 1140, PROCEDURE COMPLETED,1150, PRESSURE HELD FOR 30 MIN FOR SLOW CONTINUOUS OOZING, PT.. FEELING NAUSEATED, LIGHT HEADED LYING FLAT, ICE APPLIED, IMPROVE WITH LYING DOWN, AFTER 30 MIN NO FURTHER BLEEDING, STERI STRIPS AND DSD APPLIED TAKEN TO PRE RECOVERY FOR GINGER ALE AND CRACKERS  BEFORE STARTING ULTRASOUND PROCEDURE @125, DEBRIEF WITH NDOCTORS [id number], OUT OF ROOM FOR DOCTORS WAS 1155

## 2023-11-10 NOTE — DISCHARGE INSTRUCTIONS
AFTER THE TEST  A steri-strip and bandage will be placed over the incision. You may shower after 24 hours. Remove bandage after 24 hours. Remove bandage after the shower. Leave the steri-strips in place to fall off on their own. If after 1 week the steri-strips are still on, you may remove them. Avoid swimming or soaking in tub for 3 days.     You may have mild discomfort at the test site. If needed, you may take Tylenol (Acetaminophen) for pain. Please avoid taking NSAIDs, Motrin, Advil, Aleve, or ibuprofen for 24 hours following the biopsy. After 24 hours you may resume NSAIDSs.     If you take aspirin, Plavix, Coumadin, Xarelto or Eliquis please tell us. If these medications were stopped by your provider, please ask them when to resume.     You may have some tenderness, bruising or slight bleeding at the site. Please apply ice packs to the site for 15 minutes on and 15 minutes off for a 2 hour minimum.     Most people can return to their usual routine after the procedure. Avoid Strenuous activity for 24 hours.     Sleep in a bra the night after your biopsy. Continue to do so for comfort.     Call your doctor if you have any of the following symptoms :  Fever  Increased pain  Increased bleeding  Redness  Increased swelling  Yellowish drainage  Your doctor will get the biopsy results within 5 - 7 days. Call your doctor with any questions.     Patient education brochure and pain/comfort measures have been reviewed.   Phone number provided to contact Breast Center if problems arise.     Patient verbalized understanding of home going instructions.

## 2023-11-15 ENCOUNTER — TELEPHONE (OUTPATIENT)
Dept: SURGICAL ONCOLOGY | Facility: HOSPITAL | Age: 43
End: 2023-11-15
Payer: COMMERCIAL

## 2023-11-15 NOTE — TELEPHONE ENCOUNTER
Result Communication    Spoke with Iris Barth regarding breast biopsy results invasive ductal carcinoma. Report concordant. Patient called the office and has an appointment scheduled for surgical consultation with Dr. Rojas on Tuesday.      Resulted Orders   Surgical Pathology Exam   Result Value Ref Range    Case Report       Surgical Pathology                                Case: [id number]                                  Authorizing Provider:  TERESA Sales    Collected:           11/10/2023 1307              Ordering Location:     [facility]       Received:            11/10/2023 1719                                     Center                                                                       Pathologist:           Marina Mcintosh MD PhD                                                               Specimens:   A) - BREAST CORE BIOPSY LEFT, LEFT breast 1:00 7 sm FN                                              B) - BREAST CORE BIOPSY LEFT, LEFT breast 11:00 7 cm FN                                             C) - BREAST CORE BIOPSY RIGHT, RIGHT BREAST STEREOTACTIX VACUMM ASSISTED CORE BIOPSY                -MASS                                                                                      FINAL DIAGNOSIS       A. BREAST, LEFT, 1:00, 7 CM FN, CORE BIOPSY:   -- Invasive ductal carcinoma, grade 2, see note.    Note:  In this limited sample, the invasive carcinoma measures up to 6 mm in greatest dimension.  ER, PA and HER2 will be reported in an addendum.    B. BREAST, LEFT, 11:00, 7 CM FN, CORE BIOPSY:   -- Invasive ductal carcinoma, grade 2-3, see note.    Note:  In this limited sample, the invasive carcinoma measures up to 4 mm in greatest dimension.  ER, PA and HER2 will be reported in an addendum.    Selected slides from Part A and the Part B were [place] [facility] [place] by Tito MADSEN BREAST, RIGHT, CORE BIOPSY:  --  "Fibroadenomatoid nodule.              By the signature on this report, the individual or group listed as making the Final Interpretation/Diagnosis certifies that they have reviewed this case.       Clinical History       Ultrasound guided core biopsy LEFT breast 1:00 7 cm FN      Gross Description       A: Received in formalin, labeled with the patient´s name and hospital number and \"1:00 7 cm\", are multiple irregular/cylindrical segments of yellow-white fatty soft tissue aggregating to 2.6 x 0.3 x 0.3 cm.  The specimen is submitted in toto in one cassette.  [facility]    NOTE:  Ischemia time: Not provided.  This specimen was placed into formalin at: 11/10/23 13:07.  B: Received in formalin, labeled with the patient´s name and hospital number and \"11:00 7 cm\", are multiple irregular/cylindrical segments of yellow-white fatty soft tissue aggregating to 1.5 x 1.3 x 0.5 cm.  The specimen is submitted in toto in two cassettes.  [facility]    NOTE:  Ischemia time: Not Provided.  This specimen was placed into formalin at: 11/10/23  13:52.  C: Received in formalin, labeled with the patient´s name and hospital number and \"right breast mass\", are multiple irregular/cylindrical segments of yellow-white fatty soft tissue aggregating to 2.9 x 2.7 x 1.1 cm.  The specimen is submitted in toto in two cassettes.  [facility]    NOTE:  Ischemia time: Not provided.  This specimen was placed into formalin at: 11/10/23  13:52      Disclaimer       One or more of the reagents used to perform assays on this specimen MAY have contained components considered to be analyte specific reagents (ASR's).  ASR's have not been cleared or approved by the U.S. Food and Drug Administration.  These assays were developed and their performance characteristics determined by the Department of Pathology at [facility]. The FDA does not require this test to go through premarket FDA review. This test is used for clinical purposes. It " should not be regarded as investigational or for research. This laboratory is certified under the Clinical Laboratory Improvement Amendments (CLIA) as qualified to perform high complexity clinical laboratory testing.  The assays were performed with appropriate positive and negative controls which stained appropriately.         4:24 PM

## 2023-11-21 ENCOUNTER — OFFICE VISIT (OUTPATIENT)
Dept: SURGICAL ONCOLOGY | Facility: HOSPITAL | Age: 43
End: 2023-11-21
Payer: COMMERCIAL

## 2023-11-21 ENCOUNTER — OFFICE VISIT (OUTPATIENT)
Dept: SURGICAL ONCOLOGY | Facility: CLINIC | Age: 43
End: 2023-11-21
Payer: COMMERCIAL

## 2023-11-21 VITALS
BODY MASS INDEX: 27.46 KG/M2 | SYSTOLIC BLOOD PRESSURE: 147 MMHG | WEIGHT: 165 LBS | HEART RATE: 99 BPM | TEMPERATURE: 97.7 F | DIASTOLIC BLOOD PRESSURE: 101 MMHG

## 2023-11-21 DIAGNOSIS — Z80.3 FAMILY HISTORY OF BREAST CANCER: ICD-10-CM

## 2023-11-21 DIAGNOSIS — Z17.0 MALIGNANT NEOPLASM OF OVERLAPPING SITES OF LEFT BREAST IN FEMALE, ESTROGEN RECEPTOR POSITIVE (MULTI): ICD-10-CM

## 2023-11-21 DIAGNOSIS — N63.20 MASS OF LEFT BREAST, UNSPECIFIED QUADRANT: ICD-10-CM

## 2023-11-21 DIAGNOSIS — C50.812 MALIGNANT NEOPLASM OF OVERLAPPING SITES OF LEFT BREAST IN FEMALE, ESTROGEN RECEPTOR POSITIVE (MULTI): ICD-10-CM

## 2023-11-21 DIAGNOSIS — R92.8 ABNORMAL FINDING ON BREAST IMAGING: Primary | ICD-10-CM

## 2023-11-21 PROCEDURE — 1036F TOBACCO NON-USER: CPT | Performed by: SURGERY

## 2023-11-21 PROCEDURE — 99417 PROLNG OP E/M EACH 15 MIN: CPT | Performed by: SURGERY

## 2023-11-21 PROCEDURE — 3008F BODY MASS INDEX DOCD: CPT | Performed by: SURGERY

## 2023-11-21 PROCEDURE — 99215 OFFICE O/P EST HI 40 MIN: CPT | Performed by: SURGERY

## 2023-11-21 ASSESSMENT — ENCOUNTER SYMPTOMS
ENDOCRINE NEGATIVE: 1
HEMATOLOGIC/LYMPHATIC NEGATIVE: 1
EYES NEGATIVE: 1
MUSCULOSKELETAL NEGATIVE: 1
NEUROLOGICAL NEGATIVE: 1
SLEEP DISTURBANCE: 1
RESPIRATORY NEGATIVE: 1
MUSCULOSKELETAL NEGATIVE: 1
NEUROLOGICAL NEGATIVE: 1
HEMATOLOGIC/LYMPHATIC NEGATIVE: 1
GASTROINTESTINAL NEGATIVE: 1
CONSTITUTIONAL NEGATIVE: 1
ALLERGIC/IMMUNOLOGIC NEGATIVE: 1
GASTROINTESTINAL NEGATIVE: 1
CARDIOVASCULAR NEGATIVE: 1
ENDOCRINE NEGATIVE: 1
SLEEP DISTURBANCE: 1
ALLERGIC/IMMUNOLOGIC NEGATIVE: 1
EYES NEGATIVE: 1
CONSTITUTIONAL NEGATIVE: 1
CARDIOVASCULAR NEGATIVE: 1
RESPIRATORY NEGATIVE: 1

## 2023-11-21 ASSESSMENT — PAIN SCALES - GENERAL: PAINLEVEL: 0-NO PAIN

## 2023-11-21 NOTE — PROGRESS NOTES
Subjective   Patient ID: Iris Barth is a 43 y.o. female with left breast cancer.    HPI  Iris Barth is a very pleasant 43 y.o.  female referred by Violeta Schroeder CNP for newly diagnosed multicentric left breast invasive ductal carcinoma. ER+ (80-95%) SC+ (80-95%), HER2 pending, clinical [id number], clinical stage IA.     She denies previous breast surgery or biopsy.      BREAST IMAGIN/3/2023 Bilateral breast ultrasound, indicates BI-RADS Category 4. Left breast suspicious masses, bilateral indeterminate masses, and indeterminate left axillary lymph nodes. Further ultrasound examination with elastography of bilateral breasts as well as ultrasound guided core needle biopsy are recommended.    On 11/10/23, left breast core biopsy at 1:00, 7 cm FN showed invasive ductal carcinoma, grade 2, ER + (>95%), SC + (>95%) HER2 2+, dual DELMY pending. At  11:00, 7 CM FN, core biopsy showed invasive ductal carcinoma, grade 2-3 ER + (80%), SC + (80%), HER2 +, dual DELMY pending. Right breast core needle biopsy showed fibroadenoma.     She presents today for surgical consultation. She notes bilateral hematomas at core needle biopsy sites, resolving.      REPRODUCTIVE HISTORY: menarche age 12, , first birth age 25,  x 1 year, OCP's x 12 years, premenopausal, LMP no periods on continual birth control, scattered fibroglandular tissue                                FAMILY CANCER HISTORY:   Maternal Grandmother: Breast cancer, 54, recurrence at 70. .   Maternal Grandfather: Bone cancer age 71.     No past medical history on file.    Current Outpatient Medications on File Prior to Visit   Medication Sig Dispense Refill    aspirin 81 mg EC tablet Take 1 tablet (81 mg) by mouth once daily.      Ocella 3-0.03 mg tablet Take 1 tablet by mouth once daily.      semaglutide, weight loss, (Wegovy) 2.4 mg/0.75 mL pen injector Inject 2.4 mg under the skin every 7 days. 9 mL 1     No current  facility-administered medications on file prior to visit.       Review of Systems   Constitutional: Negative.    HENT: Negative.     Eyes: Negative.    Respiratory: Negative.     Cardiovascular: Negative.    Gastrointestinal: Negative.    Endocrine: Negative.    Genitourinary: Negative.    Musculoskeletal: Negative.    Skin: Negative.    Allergic/Immunologic: Negative.    Neurological: Negative.    Hematological: Negative.    Psychiatric/Behavioral:  Positive for sleep disturbance.         Anxiety    All other systems reviewed and are negative.      Objective   Physical Exam  General: Otherwise healthy appearing. No acute distress.     HEENT: Conjunctiva well-colored, sclera non-icteric. Neck supple, trachea midline. No lymphadenopathy or thyromegaly.     Cardiovascular: Regular rate and rhythm.    Respiratory: Clear to auscultation bilaterally.     Breast: Exam performed in the sitting and supine positions. Right breast: 1:00, 8 cm FN well healing core needle biopsy site with surrounding ecchymosis and hematoma. No skin or nipple changes. Left breast: 11:00, 11 cm FN well healing core needle biopsy site. 2:00, 6 cm FN well healing core needle biopsy site with surrounding ecchymosis and hematoma. No skin or nipple changes. Grade 2 Ptosis. 32 DD.     Abdomen: Soft, non-tender, non-distended.    Extremities: Atraumatic, no edema.     Neurologic: Motor and sensory grossly intact. Alert and oriented.     Lymphatic: No axillary, supraclavicular, or infraclavicular lymphadenopathy bilaterally.     Assessment/Plan   Today we reviewed your pertinent history, physical exam, imaging, and pathology. We discussed the natural history, prognosis, and treatment of breast cancer.     We discussed the surgical approach to early stage breast cancer and the equivalence in survival with lumpectomy plus radiation and mastectomy. Lumpectomy means removing the tumor with a rim of normal tissue around it to ensure that the margins are free  of cancer. There is a small chance of having a positive margin, which would require further surgery. Mastectomy means removing all the breast tissue, often including the nipple and areolar complex. If you have a mastectomy, we briefly discussed options for reconstruction, which would be done by a plastic surgeon. Although there are 2 cancers, they are both small, and 2 site lumpectomy would be very reasonable.     You indicated interest in bilateral mastectomy. We had a long discussion about the lack of survival benefit with contralateral prophylactic mastectomy and the small but superior recurrence and survival benefit with lumpectomy and radiation for young women with breast cancer. Further, I explained bilateral mastectomy would only be medically indicated if you had a genetic mutation, I.e. BRCA 1 or 2 mutation. Referral to genetics was placed. Options for reconstruction were discussed and referral to plastic surgery was arranged. We also discussed that you are not a candidate for nipple sparing approach.     I would recommend an MRI prior to surgery if you chose lumpectomy to confirm extent of disease.     We also need to evaluate the lymph nodes under the arm with a sentinel lymph node biopsy. A small amount of radiotracer and blue dye will be injected prior to surgery to identify the first several nodes that drain the breast tissue, and these will be removed to check for cancer cells.     The surgery itself is a same-day outpatient procedure. We reviewed the expectations for surgery and postoperative care, and this information was provided in writing. Following surgery, you will return to the office for a postoperative visit and to discuss your pathology results. We discussed the risks, benefits, and alternatives to surgery, including bleeding, infection, lymphedema (arm swelling), impaired wound healing, numbness, pain, and breast asymmetry.     We discussed the overall treatment of breast cancer, which may  include radiation, chemotherapy, and/or anti-estrogen medication.    At the end of our visit today, we agreed you would consider your options and let us know how you would like to proceed.    All questions were answered and we are in agreement with the following plan:  1. Please let us know at your earliest convenience how you would like to proceed with surgery. Return visit to be scheduled for consent and surgical teaching.   2. Referral to plastic surgery.   3. Referral to genetics for consultation.     If you have any questions or concerns, please call us at [phone] (option #2). I appreciate the opportunity to care for you, and I look forward to seeing you again soon.     Vaishnavi Rojas MD   Breast Surgical Oncology     **DISCLAIMER** Speech recognition software was used to create portions of this document. While an attempt at proofreading has been made, minor errors in transcription may be present.    Scribe Attestation  By signing my name below, IKaylah, Scribe   attest that this documentation has been prepared under the direction and in the presence of Vaishnavi Rojas MD.    Diagnoses and all orders for this visit:  Abnormal finding on breast imaging  Malignant neoplasm of overlapping sites of left breast in female, estrogen receptor positive (CMS/HCC)  Mass of left breast, unspecified quadrant  -     Referral to Genetics; Future  -     Referral to Plastic Surgery; Future  Family history of breast cancer

## 2023-11-22 PROBLEM — C50.812 MALIGNANT NEOPLASM OF OVERLAPPING SITES OF LEFT BREAST IN FEMALE, ESTROGEN RECEPTOR POSITIVE (MULTI): Status: ACTIVE | Noted: 2023-11-22

## 2023-11-22 PROBLEM — Z17.0 MALIGNANT NEOPLASM OF OVERLAPPING SITES OF LEFT BREAST IN FEMALE, ESTROGEN RECEPTOR POSITIVE (MULTI): Status: ACTIVE | Noted: 2023-11-22

## 2023-11-28 LAB
LAB AP ASR DISCLAIMER: NORMAL
LABORATORY COMMENT REPORT: NORMAL
PATH REPORT.ADDENDUM SPEC: NORMAL
PATH REPORT.ADDENDUM SPEC: NORMAL
PATH REPORT.FINAL DX SPEC: NORMAL
PATH REPORT.GROSS SPEC: NORMAL
PATH REPORT.RELEVANT HX SPEC: NORMAL
PATH REPORT.TOTAL CANCER: NORMAL

## 2023-11-28 PROCEDURE — 88368 INSITU HYBRIDIZATION MANUAL: CPT | Performed by: PATHOLOGY

## 2023-11-28 PROCEDURE — 88368 INSITU HYBRIDIZATION MANUAL: CPT | Mod: TC,SUR | Performed by: NURSE PRACTITIONER

## 2023-11-29 ENCOUNTER — TELEPHONE (OUTPATIENT)
Dept: SURGICAL ONCOLOGY | Facility: CLINIC | Age: 43
End: 2023-11-29
Payer: COMMERCIAL

## 2023-11-29 DIAGNOSIS — C50.812 MALIGNANT NEOPLASM OF OVERLAPPING SITES OF LEFT BREAST IN FEMALE, ESTROGEN RECEPTOR POSITIVE (MULTI): ICD-10-CM

## 2023-11-29 DIAGNOSIS — Z17.0 MALIGNANT NEOPLASM OF OVERLAPPING SITES OF LEFT BREAST IN FEMALE, ESTROGEN RECEPTOR POSITIVE (MULTI): ICD-10-CM

## 2023-12-05 ENCOUNTER — HOSPITAL ENCOUNTER (OUTPATIENT)
Dept: CARDIOLOGY | Facility: CLINIC | Age: 43
Discharge: HOME | End: 2023-12-05
Payer: COMMERCIAL

## 2023-12-05 ENCOUNTER — LAB (OUTPATIENT)
Dept: LAB | Facility: LAB | Age: 43
End: 2023-12-05
Payer: COMMERCIAL

## 2023-12-05 ENCOUNTER — OFFICE VISIT (OUTPATIENT)
Dept: PLASTIC SURGERY | Facility: CLINIC | Age: 43
End: 2023-12-05
Payer: COMMERCIAL

## 2023-12-05 VITALS
DIASTOLIC BLOOD PRESSURE: 90 MMHG | WEIGHT: 168 LBS | HEART RATE: 96 BPM | SYSTOLIC BLOOD PRESSURE: 120 MMHG | BODY MASS INDEX: 27.96 KG/M2

## 2023-12-05 DIAGNOSIS — Z17.0 MALIGNANT NEOPLASM OF OVERLAPPING SITES OF LEFT BREAST IN FEMALE, ESTROGEN RECEPTOR POSITIVE (MULTI): ICD-10-CM

## 2023-12-05 DIAGNOSIS — Z17.1 MALIGNANT NEOPLASM OF UPPER-OUTER QUADRANT OF LEFT BREAST IN FEMALE, ESTROGEN RECEPTOR NEGATIVE (MULTI): ICD-10-CM

## 2023-12-05 DIAGNOSIS — C50.812 MALIGNANT NEOPLASM OF OVERLAPPING SITES OF LEFT BREAST IN FEMALE, ESTROGEN RECEPTOR POSITIVE (MULTI): ICD-10-CM

## 2023-12-05 DIAGNOSIS — N63.20 MASS OF LEFT BREAST, UNSPECIFIED QUADRANT: ICD-10-CM

## 2023-12-05 DIAGNOSIS — Z01.818 PREOP TESTING: ICD-10-CM

## 2023-12-05 DIAGNOSIS — F41.1 ANXIETY ABOUT MASTECTOMY: ICD-10-CM

## 2023-12-05 DIAGNOSIS — C50.412 MALIGNANT NEOPLASM OF UPPER-OUTER QUADRANT OF LEFT BREAST IN FEMALE, ESTROGEN RECEPTOR NEGATIVE (MULTI): ICD-10-CM

## 2023-12-05 DIAGNOSIS — G89.18 POST-OP PAIN: ICD-10-CM

## 2023-12-05 LAB
ERYTHROCYTE [DISTWIDTH] IN BLOOD BY AUTOMATED COUNT: 12.5 % (ref 11.5–14.5)
EST. AVERAGE GLUCOSE BLD GHB EST-MCNC: 85 MG/DL
HBA1C MFR BLD: 4.6 %
HCT VFR BLD AUTO: 39.8 % (ref 36–46)
HGB BLD-MCNC: 13.4 G/DL (ref 12–16)
MCH RBC QN AUTO: 30.7 PG (ref 26–34)
MCHC RBC AUTO-ENTMCNC: 33.7 G/DL (ref 32–36)
MCV RBC AUTO: 91 FL (ref 80–100)
NRBC BLD-RTO: 0 /100 WBCS (ref 0–0)
PLATELET # BLD AUTO: 373 X10*3/UL (ref 150–450)
RBC # BLD AUTO: 4.37 X10*6/UL (ref 4–5.2)
WBC # BLD AUTO: 6.9 X10*3/UL (ref 4.4–11.3)

## 2023-12-05 PROCEDURE — 1036F TOBACCO NON-USER: CPT | Performed by: SURGERY

## 2023-12-05 PROCEDURE — 84134 ASSAY OF PREALBUMIN: CPT

## 2023-12-05 PROCEDURE — 93010 ELECTROCARDIOGRAM REPORT: CPT | Performed by: INTERNAL MEDICINE

## 2023-12-05 PROCEDURE — 36415 COLL VENOUS BLD VENIPUNCTURE: CPT

## 2023-12-05 PROCEDURE — 83036 HEMOGLOBIN GLYCOSYLATED A1C: CPT

## 2023-12-05 PROCEDURE — 3008F BODY MASS INDEX DOCD: CPT | Performed by: SURGERY

## 2023-12-05 PROCEDURE — 99417 PROLNG OP E/M EACH 15 MIN: CPT | Performed by: SURGERY

## 2023-12-05 PROCEDURE — 93005 ELECTROCARDIOGRAM TRACING: CPT

## 2023-12-05 PROCEDURE — 85027 COMPLETE CBC AUTOMATED: CPT

## 2023-12-05 PROCEDURE — 80053 COMPREHEN METABOLIC PANEL: CPT

## 2023-12-05 PROCEDURE — 99205 OFFICE O/P NEW HI 60 MIN: CPT | Performed by: SURGERY

## 2023-12-05 RX ORDER — SODIUM CHLORIDE 9 MG/ML
100 INJECTION, SOLUTION INTRAVENOUS CONTINUOUS
Status: CANCELLED | OUTPATIENT
Start: 2023-12-05

## 2023-12-05 RX ORDER — GABAPENTIN 600 MG/1
600 TABLET ORAL ONCE
Status: CANCELLED | OUTPATIENT
Start: 2023-12-05 | End: 2023-12-05

## 2023-12-05 RX ORDER — HEPARIN SODIUM 10000 [USP'U]/ML
5000 INJECTION, SOLUTION INTRAVENOUS; SUBCUTANEOUS ONCE
Status: CANCELLED | OUTPATIENT
Start: 2023-12-05

## 2023-12-05 RX ORDER — CELECOXIB 200 MG/1
200 CAPSULE ORAL 2 TIMES DAILY
Qty: 28 CAPSULE | Refills: 0 | Status: SHIPPED | OUTPATIENT
Start: 2023-12-05 | End: 2023-12-19

## 2023-12-05 RX ORDER — SULFAMETHOXAZOLE AND TRIMETHOPRIM 800; 160 MG/1; MG/1
1 TABLET ORAL 2 TIMES DAILY
Qty: 28 TABLET | Refills: 0 | Status: SHIPPED | OUTPATIENT
Start: 2023-12-05 | End: 2023-12-19

## 2023-12-05 RX ORDER — LORAZEPAM 0.5 MG/1
0.5 TABLET ORAL EVERY 8 HOURS PRN
COMMUNITY
End: 2024-01-23 | Stop reason: ALTCHOICE

## 2023-12-05 RX ORDER — ACETAMINOPHEN 500 MG
1000 TABLET ORAL EVERY 8 HOURS
Qty: 30 TABLET | Refills: 0 | Status: SHIPPED | OUTPATIENT
Start: 2023-12-05 | End: 2023-12-19

## 2023-12-05 RX ORDER — SCOLOPAMINE TRANSDERMAL SYSTEM 1 MG/1
1 PATCH, EXTENDED RELEASE TRANSDERMAL ONCE
Status: CANCELLED | OUTPATIENT
Start: 2023-12-05 | End: 2023-12-05

## 2023-12-05 RX ORDER — HEPARIN SODIUM 5000 [USP'U]/.5ML
5000 INJECTION, SOLUTION INTRAVENOUS; SUBCUTANEOUS ONCE
Qty: 0.5 ML | Refills: 0 | Status: CANCELLED | OUTPATIENT
Start: 2024-01-08 | End: 2024-01-08

## 2023-12-05 RX ORDER — GABAPENTIN 300 MG/1
300 CAPSULE ORAL EVERY 8 HOURS
Qty: 42 CAPSULE | Refills: 0 | Status: ON HOLD | OUTPATIENT
Start: 2023-12-05 | End: 2024-01-08

## 2023-12-05 RX ORDER — CHLORHEXIDINE GLUCONATE 40 MG/ML
SOLUTION TOPICAL ONCE
Qty: 118 ML | Refills: 0 | Status: SHIPPED | OUTPATIENT
Start: 2023-12-05 | End: 2023-12-05

## 2023-12-05 ASSESSMENT — PAIN SCALES - GENERAL: PAINLEVEL: 0-NO PAIN

## 2023-12-05 NOTE — PROGRESS NOTES
Plastic Surgery Clinic Visit  Breast Reconstruction    Patient Name: Iris Barth  MRN: [id number]  Date:  12/05/23     Subjective  Iris Barth is a 43 y.o. female with multicentric left breast invasive ductal carcinoma. ER+ (80-95%) FL+ (80-95%), HER2 pending, clinical [id number], clinical stage IA  who is referred by  for breast reconstruction options.  The patient has decided to go forth with bilateral mastectomies and is interested in implant based reconstruction.  She does not mind decreased size and would like to be lifted.  She has had a abdominoplasty in the past.  She has a history of a DVT after a knee procedure.  Her father has a history of DVT/PE after tibial plateau fracture.  She did have any difficulty after her abdominoplasty and does not have a history of miscarriages.      No past medical history on file.  Past Surgical History:   Procedure Laterality Date    BI STEREOTACTIC GUIDED BREAST RIGHT LOCALIZATION AND BIOPSY Right 11/10/2023    BI STEREOSTATIC GUIDED BREAST RIGHT LOCALIZATION AND BIOPSY 11/10/2023 Francia Krishnamurthy MD [facility]    BI US GUIDED BREAST LOCALIZATION AND BIOPSY LEFT Left 11/10/2023    BI US GUIDED BREAST LOCALIZATION AND BIOPSY LEFT 11/10/2023 Francia Krishnamurthy MD [facility]    KNEE SURGERY  08/31/2015    Knee Surgery     No Known Allergies    Current Outpatient Medications:     aspirin 81 mg EC tablet, Take 1 tablet (81 mg) by mouth once daily., Disp: , Rfl:     Ocella 3-0.03 mg tablet, Take 1 tablet by mouth once daily., Disp: , Rfl:     semaglutide, weight loss, (Wegovy) 2.4 mg/0.75 mL pen injector, Inject 2.4 mg under the skin every 7 days., Disp: 9 mL, Rfl: 1   Family History   Problem Relation Name Age of Onset    Blood clot Father      Skin cancer Father      Breast cancer Maternal Grandmother      Breast cancer Paternal Grandmother      Cancer Other grandparent      Social History     Tobacco Use    Smoking status: Never     Passive exposure: Never    Smokeless  tobacco: Never   Vaping Use    Vaping Use: Never used   Substance Use Topics    Alcohol use: Defer    Drug use: Defer       ROS:  ROS: All 10 systems were reviewed and are unremarkable except for those mentioned in HPI.    Objective    /90   Pulse 96   Wt 76.2 kg (168 lb)   BMI 27.96 kg/m²      Physical Exam  Constitutional:       Appearance: Normal appearance. She is normal weight.   HENT:      Head: Normocephalic and atraumatic.   Eyes:      Conjunctiva/sclera: Conjunctivae normal.   Cardiovascular:      Rate and Rhythm: Normal rate and regular rhythm.   Pulmonary:      Effort: Pulmonary effort is normal. No respiratory distress.      Breath sounds: Normal breath sounds.   Abdominal:      Palpations: Abdomen is soft.   Musculoskeletal:      Cervical back: Normal range of motion.      Comments: Gait normal   Skin:     General: Skin is warm and dry.   Neurological:      General: No focal deficit present.      Mental Status: She is alert and oriented to person, place, and time.   Psychiatric:         Mood and Affect: Mood normal.         Behavior: Behavior normal.         Thought Content: Thought content normal.         Judgment: Judgment normal.         Breast Exam:  Left Breast:  NTN: 29.5cm  BW: 16.0cm  NTF: 11.5cm  CW: 12.0cm  Ptosis: Grade 2-3  Comments: Volume >75gm,  superior pole striae      Right Breast:  NTN: 29.5cm  BW: 16.0cm  NTF: 10.5cm  CW: 12.0cm  Ptosis: Grade 2-3  Comments: IMF lower 1.5cm, superior pole striae    Photos  Completed at this visit      Diagnostics   No results found for this or any previous visit (from the past 24 hour(s)).  [place]-BI MAMMO RIGHT DIAGNOSTIC TOMOSYNTHESIS IMPORT    Result Date: 11/17/2023  Images were obtained outside of [facility]    [place]-BI US BREAST LIMITED BILATERAL IMPORT    Result Date: 11/17/2023  Images were obtained outside of [facility]    [place]-BI STEREOSTATIC GUIDED BREAST RIGHT LOCALIZATION AND BIOPSY  IMPORT    Result Date: 11/17/2023  Images were obtained outside of [facility]    [place]-BI BREAST BIOPSY CLIP IMAGING IMPORT    Result Date: 11/17/2023  Images were obtained outside of [facility]    ND-BI US GUIDED BREAST LOCALIZATION AND BIOPSY LEFT IMPORT    Result Date: 11/17/2023  Images were obtained outside of [facility]    BI breast biopsy clip imaging    Addendum Date: 11/15/2023    Interpreted By:  Francia Krishnamurthy, ADDENDUM: The final pathology for left breast mass in the 1 o'clock position 7 cm from the nipple demonstrates invasive ductal carcinoma, grade 2. This is concordant with the imaging findings.  Surgical consultation is recommended.   The final pathology for the left breast mass in the 11 o'clock position 7 cm from the nipple demonstrates invasive ductal carcinoma, grade 2-3. This is concordant with the imaging findings. Surgical consultation is recommended.   The final pathology for the right breast mass demonstrates fibroadenomatoid nodule. This is concordant.   Estimated size of left breast mass in the 1 o'clock position 7 cm from the nipple: The mass measures 0.4 x 0.3 x 0.3 cm on ultrasound 11/10/2023.   Estimated size of the left breast mass in the 11 o'clock position 7 cm from the nipple: The mass measures 0.5 x 0.2 x 0.4 cm on ultrasound 11/10/2023.   Ipsilateral lymph nodes:  Were unremarkable on ultrasound of on ultrasound 11/10/2023.   MRI recommendation: At the discretion of the referring surgeon.       Signed by: Francia Krishnamurthy 11/15/2023 1:29 PM   -------- ORIGINAL REPORT -------- Dictation workstation:   [id number]    Result Date: 11/15/2023  Interpreted By:  Francia Krishnamurthy,  and Flaco Acosta STUDY: BI STEREOSTATIC GUIDED BREAST RIGHT LOCALIZATION AND BIOPSY; BI US GUIDED BREAST LOCALIZATION AND BIOPSY LEFT; BI BREAST BIOPSY CLIP IMAGING;  11/10/2023 11:53 am; 11/10/2023 1:52 pm; 11/10/2023 2:25 pm   ACCESSION NUMBER(S): [id number];  [id number]; [id number]   ORDERING CLINICIAN: MONY MEDEROS   INDICATION: Right breast mass without a sonographic correlate and 2 suspicious left breast masses.   FINDINGS: PREPROCEDURAL CONSULTATION: The history and physical exam pertinent to the procedure were reviewed and no updates were made.   The procedure was explained to the patient including the risks, benefits, and alternatives. Medications were discussed, including any prior use of blood thinning medications by the patient. Patient allergies were reviewed. The risks, including but not limited to infection and bleeding, were reviewed by the performing physician and the patient agreed to undergo the procedure.   Prior to the procedure, an audible timeout was done to verify patient identification, site and type of procedure. Dr. Francia Krishnamurthy, Dr. Dave Sam, Dr. Yuri Boston, a radiology nurse and two mammography technologists were present.   PROCEDURE 1: A  view of the right breast localized the mass in the superomedial breast at middle depth. A superior approach was used. The breast was prepped and draped in a sterile manner.  11 mL of buffered 1% lidocaine was injected subcutaneously and then into the deeper tissues. 9 tissue core specimens were then obtained with a 9-gauge vacuum assisted biopsy needle with minimal bleeding (estimated blood loss less than 10 mL). A dumbbell shaped Trimark tissue marker was then placed into the biopsy site. Hemostasis was achieved with manual compression.  The patient tolerated the procedure without difficulty.   Pathology specimens were labeled with patient identifiers while the patient was in the procedure room, and then sent to the pathology department.   The patient was then brought to the ultrasound biopsy suite. Prior to the procedure, an audible timeout was done to verify patient identification, site and type of procedure. Dr. Francia Krishnamurthy, Dr. Yuri Boston, a radiology nurse and an ultrasound technologist were  present.   PROCEDURE 2: Scanning of the left breast redemonstrated the mass of concern in the 1 o'clock position, 7 cm from the nipple. The left breast was marked under ultrasound guidance and cleansed. 3.5 mL of buffered 1% lidocaine was injected subcutaneously and then into the deeper tissues surrounding the mass. A small incision was made. 1 tissue core specimen was then obtained with a 12-gauge spring-loaded biopsy needle with heavy bleeding (approximally 10 mL of blood loss). Hemostasis was achieved with manual compression and a 2nd tissue core specimen was then obtained with a 12 gauge spring-loaded biopsy needle. An open coil Hydromark tissue marker was then placed into the biopsy site.  Hemostasis was achieved with manual compression. The patient tolerated the procedure without difficulty.   Prior to the procedure, an audible timeout was done to verify patient identification, site and type of procedure. Dr. Francia Krishnamurthy, Dr. Dave Sam, a radiology nurse and an ultrasound technologist were present.   PROCEDURE 3: Scanning of the left breast redemonstrated the mass of concern in the 11 o'clock position, 7 cm from the nipple. The left breast was marked under ultrasound guidance and cleansed. 7 mL of buffered 1% lidocaine was injected subcutaneously and then into the deeper tissues surrounding the mass. A small incision was made. 5 tissue core specimens was then obtained with a 12-gauge vacuum-assisted biopsy needle with minimal bleeding (estimated blood loss less than 10 mL). A butterfly Hydromark tissue marker was then placed into the biopsy site.  Hemostasis was achieved with manual compression. The patient tolerated the procedure without difficulty.   Pathology specimens were labeled with patient identifiers while the patient was in the procedure room, and then sent to the pathology department.   The postbiopsy mammogram demonstrates satisfactory placement of all 3 tissue markers.   The patient experienced no  complications during the procedure. Home-going/follow-up instructions were reviewed with the patient before she left the department.       Status post stereotactic guided core needle biopsy of right breast mass followed by tissue marker placement. Pathology is pending.   Status post ultrasound guided core needle biopsy of 2 left breast masses followed by tissue marker placement. Pathology is pending.   POST PROCEDURE MAMMOGRAM FOR MARKER PLACEMENT.   I personally reviewed the images/study and I agree with the findings as stated by fellow physician, Dr. Dave Sam.   MACRO: None   Signed by: Francia Krishnamurthy 11/10/2023 3:11 PM Dictation workstation:   [id number]     BI US guided breast localization and biopsy left    Addendum Date: 11/15/2023    Interpreted By:  Francia Krishnamurthy, ADDENDUM: The final pathology for left breast mass in the 1 o'clock position 7 cm from the nipple demonstrates invasive ductal carcinoma, grade 2. This is concordant with the imaging findings.  Surgical consultation is recommended.   The final pathology for the left breast mass in the 11 o'clock position 7 cm from the nipple demonstrates invasive ductal carcinoma, grade 2-3. This is concordant with the imaging findings. Surgical consultation is recommended.   The final pathology for the right breast mass demonstrates fibroadenomatoid nodule. This is concordant.   Estimated size of left breast mass in the 1 o'clock position 7 cm from the nipple: The mass measures 0.4 x 0.3 x 0.3 cm on ultrasound 11/10/2023.   Estimated size of the left breast mass in the 11 o'clock position 7 cm from the nipple: The mass measures 0.5 x 0.2 x 0.4 cm on ultrasound 11/10/2023.   Ipsilateral lymph nodes:  Were unremarkable on ultrasound of on ultrasound 11/10/2023.   MRI recommendation: At the discretion of the referring surgeon.       Signed by: Francia Krishnamurthy 11/15/2023 1:29 PM   -------- ORIGINAL REPORT -------- Dictation workstation:   [id number]    Result Date:  11/15/2023  Interpreted By:  Francia Krishnamurthy and Avery Ross STUDY: BI STEREOSTATIC GUIDED BREAST RIGHT LOCALIZATION AND BIOPSY; BI US GUIDED BREAST LOCALIZATION AND BIOPSY LEFT; BI BREAST BIOPSY CLIP IMAGING;  11/10/2023 11:53 am; 11/10/2023 1:52 pm; 11/10/2023 2:25 pm   ACCESSION NUMBER(S): [id number]; [id number]; [id number]   ORDERING CLINICIAN: MONY MEDEROS   INDICATION: Right breast mass without a sonographic correlate and 2 suspicious left breast masses.   FINDINGS: PREPROCEDURAL CONSULTATION: The history and physical exam pertinent to the procedure were reviewed and no updates were made.   The procedure was explained to the patient including the risks, benefits, and alternatives. Medications were discussed, including any prior use of blood thinning medications by the patient. Patient allergies were reviewed. The risks, including but not limited to infection and bleeding, were reviewed by the performing physician and the patient agreed to undergo the procedure.   Prior to the procedure, an audible timeout was done to verify patient identification, site and type of procedure. Dr. Francia Krishnamurthy, Dr. Dave Sam, Dr. Yuri Boston, a radiology nurse and two mammography technologists were present.   PROCEDURE 1: A  view of the right breast localized the mass in the superomedial breast at middle depth. A superior approach was used. The breast was prepped and draped in a sterile manner.  11 mL of buffered 1% lidocaine was injected subcutaneously and then into the deeper tissues. 9 tissue core specimens were then obtained with a 9-gauge vacuum assisted biopsy needle with minimal bleeding (estimated blood loss less than 10 mL). A dumbbell shaped Trimark tissue marker was then placed into the biopsy site. Hemostasis was achieved with manual compression.  The patient tolerated the procedure without difficulty.   Pathology specimens were labeled with patient identifiers while the patient was in the procedure room, and  then sent to the pathology department.   The patient was then brought to the ultrasound biopsy suite. Prior to the procedure, an audible timeout was done to verify patient identification, site and type of procedure. Dr. Francia Krishnamurthy, Dr. Yuri Boston, a radiology nurse and an ultrasound technologist were present.   PROCEDURE 2: Scanning of the left breast redemonstrated the mass of concern in the 1 o'clock position, 7 cm from the nipple. The left breast was marked under ultrasound guidance and cleansed. 3.5 mL of buffered 1% lidocaine was injected subcutaneously and then into the deeper tissues surrounding the mass. A small incision was made. 1 tissue core specimen was then obtained with a 12-gauge spring-loaded biopsy needle with heavy bleeding (approximally 10 mL of blood loss). Hemostasis was achieved with manual compression and a 2nd tissue core specimen was then obtained with a 12 gauge spring-loaded biopsy needle. An open coil Hydromark tissue marker was then placed into the biopsy site.  Hemostasis was achieved with manual compression. The patient tolerated the procedure without difficulty.   Prior to the procedure, an audible timeout was done to verify patient identification, site and type of procedure. Dr. Francia Krishnamurthy, Dr. Dave Sam, a radiology nurse and an ultrasound technologist were present.   PROCEDURE 3: Scanning of the left breast redemonstrated the mass of concern in the 11 o'clock position, 7 cm from the nipple. The left breast was marked under ultrasound guidance and cleansed. 7 mL of buffered 1% lidocaine was injected subcutaneously and then into the deeper tissues surrounding the mass. A small incision was made. 5 tissue core specimens was then obtained with a 12-gauge vacuum-assisted biopsy needle with minimal bleeding (estimated blood loss less than 10 mL). A butterfly Hydromark tissue marker was then placed into the biopsy site.  Hemostasis was achieved with manual compression. The patient  tolerated the procedure without difficulty.   Pathology specimens were labeled with patient identifiers while the patient was in the procedure room, and then sent to the pathology department.   The postbiopsy mammogram demonstrates satisfactory placement of all 3 tissue markers.   The patient experienced no complications during the procedure. Home-going/follow-up instructions were reviewed with the patient before she left the department.       Status post stereotactic guided core needle biopsy of right breast mass followed by tissue marker placement. Pathology is pending.   Status post ultrasound guided core needle biopsy of 2 left breast masses followed by tissue marker placement. Pathology is pending.   POST PROCEDURE MAMMOGRAM FOR MARKER PLACEMENT.   I personally reviewed the images/study and I agree with the findings as stated by fellow physician, Dr. Dave Sam.   MACRO: None   Signed by: Francia Krishnamurthy 11/10/2023 3:11 PM Dictation workstation:   [id number]     BI stereostatic guided breast right localization and biopsy    Addendum Date: 11/15/2023    Interpreted By:  Francia Krishnamurthy, ADDENDUM: The final pathology for left breast mass in the 1 o'clock position 7 cm from the nipple demonstrates invasive ductal carcinoma, grade 2. This is concordant with the imaging findings.  Surgical consultation is recommended.   The final pathology for the left breast mass in the 11 o'clock position 7 cm from the nipple demonstrates invasive ductal carcinoma, grade 2-3. This is concordant with the imaging findings. Surgical consultation is recommended.   The final pathology for the right breast mass demonstrates fibroadenomatoid nodule. This is concordant.   Estimated size of left breast mass in the 1 o'clock position 7 cm from the nipple: The mass measures 0.4 x 0.3 x 0.3 cm on ultrasound 11/10/2023.   Estimated size of the left breast mass in the 11 o'clock position 7 cm from the nipple: The mass measures 0.5 x 0.2 x 0.4 cm on  ultrasound 11/10/2023.   Ipsilateral lymph nodes:  Were unremarkable on ultrasound of on ultrasound 11/10/2023.   MRI recommendation: At the discretion of the referring surgeon.       Signed by: Francia Krishnamurthy 11/15/2023 1:29 PM   -------- ORIGINAL REPORT -------- Dictation workstation:   [id number]    Result Date: 11/15/2023  Interpreted By:  Francia Krishnamurthy and Avery Ross STUDY: BI STEREOSTATIC GUIDED BREAST RIGHT LOCALIZATION AND BIOPSY; BI US GUIDED BREAST LOCALIZATION AND BIOPSY LEFT; BI BREAST BIOPSY CLIP IMAGING;  11/10/2023 11:53 am; 11/10/2023 1:52 pm; 11/10/2023 2:25 pm   ACCESSION NUMBER(S): [id number]; [id number]; [id number]   ORDERING CLINICIAN: MONY MEDEROS   INDICATION: Right breast mass without a sonographic correlate and 2 suspicious left breast masses.   FINDINGS: PREPROCEDURAL CONSULTATION: The history and physical exam pertinent to the procedure were reviewed and no updates were made.   The procedure was explained to the patient including the risks, benefits, and alternatives. Medications were discussed, including any prior use of blood thinning medications by the patient. Patient allergies were reviewed. The risks, including but not limited to infection and bleeding, were reviewed by the performing physician and the patient agreed to undergo the procedure.   Prior to the procedure, an audible timeout was done to verify patient identification, site and type of procedure. Dr. Francia Krishnamurthy, Dr. Dave Sam, Dr. Yuri Boston, a radiology nurse and two mammography technologists were present.   PROCEDURE 1: A  view of the right breast localized the mass in the superomedial breast at middle depth. A superior approach was used. The breast was prepped and draped in a sterile manner.  11 mL of buffered 1% lidocaine was injected subcutaneously and then into the deeper tissues. 9 tissue core specimens were then obtained with a 9-gauge vacuum assisted biopsy needle with minimal bleeding (estimated blood  loss less than 10 mL). A dumbbell shaped Trimark tissue marker was then placed into the biopsy site. Hemostasis was achieved with manual compression.  The patient tolerated the procedure without difficulty.   Pathology specimens were labeled with patient identifiers while the patient was in the procedure room, and then sent to the pathology department.   The patient was then brought to the ultrasound biopsy suite. Prior to the procedure, an audible timeout was done to verify patient identification, site and type of procedure. Dr. Francia Krishnamurthy, Dr. Yuri Boston, a radiology nurse and an ultrasound technologist were present.   PROCEDURE 2: Scanning of the left breast redemonstrated the mass of concern in the 1 o'clock position, 7 cm from the nipple. The left breast was marked under ultrasound guidance and cleansed. 3.5 mL of buffered 1% lidocaine was injected subcutaneously and then into the deeper tissues surrounding the mass. A small incision was made. 1 tissue core specimen was then obtained with a 12-gauge spring-loaded biopsy needle with heavy bleeding (approximally 10 mL of blood loss). Hemostasis was achieved with manual compression and a 2nd tissue core specimen was then obtained with a 12 gauge spring-loaded biopsy needle. An open coil Hydromark tissue marker was then placed into the biopsy site.  Hemostasis was achieved with manual compression. The patient tolerated the procedure without difficulty.   Prior to the procedure, an audible timeout was done to verify patient identification, site and type of procedure. Dr. Francia Krishnamurthy, Dr. Dave Sam, a radiology nurse and an ultrasound technologist were present.   PROCEDURE 3: Scanning of the left breast redemonstrated the mass of concern in the 11 o'clock position, 7 cm from the nipple. The left breast was marked under ultrasound guidance and cleansed. 7 mL of buffered 1% lidocaine was injected subcutaneously and then into the deeper tissues surrounding the mass. A  small incision was made. 5 tissue core specimens was then obtained with a 12-gauge vacuum-assisted biopsy needle with minimal bleeding (estimated blood loss less than 10 mL). A butterfly Hydromark tissue marker was then placed into the biopsy site.  Hemostasis was achieved with manual compression. The patient tolerated the procedure without difficulty.   Pathology specimens were labeled with patient identifiers while the patient was in the procedure room, and then sent to the pathology department.   The postbiopsy mammogram demonstrates satisfactory placement of all 3 tissue markers.   The patient experienced no complications during the procedure. Home-going/follow-up instructions were reviewed with the patient before she left the department.       Status post stereotactic guided core needle biopsy of right breast mass followed by tissue marker placement. Pathology is pending.   Status post ultrasound guided core needle biopsy of 2 left breast masses followed by tissue marker placement. Pathology is pending.   POST PROCEDURE MAMMOGRAM FOR MARKER PLACEMENT.   I personally reviewed the images/study and I agree with the findings as stated by fellow physician, Dr. Dave Sam.   MACRO: None   Signed by: Francia Krishnamurthy 11/10/2023 3:11 PM Dictation workstation:   [id number]     BI US breast limited bilateral    Result Date: 11/10/2023  Interpreted By:  Francia Krishnamurthy and Avery Ross STUDY: BI US BREAST LIMITED BILATERAL; BI MAMMO RIGHT DIAGNOSTIC TOMOSYNTHESIS;  11/10/2023 10:15 am; 11/10/2023 11:04 am   ACCESSION NUMBER(S): [id number]; [id number]   ORDERING CLINICIAN: MNOY MEDEROS   INDICATION: Preprocedural workup of bilateral breast masses and indeterminate left axillary lymph nodes.   COMPARISON: 11/03/2023 and 10/20/2023.   FINDINGS: ULTRASOUND:   RIGHT BREAST AND AXILLA: A targeted ultrasound was performed by a registered sonographer using elastography.   The previously noted mass at the 12 o'clock position 6 cm  from the nipple represents benign fatty tissue. An oval simple cyst is incidentally identified at the 12 o'clock position 3 cm from the nipple. The cyst measures 0.5 x 0.1 x 0.3 cm. It is avascular and soft on elastography.   No suspicious sonographic abnormalities are visualized throughout the entire superior breast to correspond with the mammographic breast mass seen on mammogram 10/20/2023.   4 morphologically normal axillary lymph nodes are identified.   LEFT BREAST AND AXILLA: A targeted ultrasound was performed by a registered sonographer using elastography.   An irregular hypoechoic mass with angular margins is identified at the 11 o'clock position 7 cm from the nipple. It measures 0.5 x 0.2 x 0.4 cm. The mass is avascular and soft on elastography. It corresponds with the mammographic mass in the slightly central medial breast at far posterior depth.   An irregular non parallel hypoechoic mass with posterior shadowing is seen at the 1 o'clock position 7 cm from the nipple. It measures 0.4 x 0.3 x 0.3 cm. The mass is avascular and demonstrates intermediate stiffness on elastography. It corresponds with the mammographic mass in the superolateral breast at middle depth.   An oval parallel circumscribed hypoechoic mass with an internal septation is seen at the 2 o'clock position 8 cm from the nipple. The mass measures 0.7 x 0.2 x 0.4 cm. It is avascular and soft on elastography. This is favored to represent a probably benign complicated cyst.   4 morphologically normal axillary lymph nodes are identified.   MAMMOGRAPHY: 2D and tomosynthesis images of the right breast were reviewed at 1 mm slice thickness.   Density:  There are areas of scattered fibroglandular tissue.   The previously noted mammographic right breast mass without a definitive sonographic correlate persists in the superior and slightly medial breast at middle depth on additional imaging.       Suspicious left breast masses at the 11 o'clock and 1  o'clock position without axillary lymphadenopathy. An ultrasound-guided biopsy of both masses will be performed today.   Indeterminate right breast mass without a sonographic correlate. A stereotactic guided biopsy of this mass will be performed later today.   Probably benign left breast mass at the 2 o'clock position which may represent a complicated cyst. A six-month follow-up ultrasound is recommended to document stability.   Method of Detection: Category Sdbt - 3D Screening   BI-RADS CATEGORY:   BI-RADS Category:  4 Suspicious. Recommendation:  Biopsy. Recommended Date:  Immediate. Laterality:  Bilateral.   For any future breast imaging appointments, please call [phone]).   I personally reviewed the images/study and I agree with the findings as stated by fellow physician, Dr. Dave Sam.   Signed by: Francia Krishnamurthy 11/10/2023 3:29 PM Dictation workstation:   [id number]    BI mammo right diagnostic tomosynthesis    Result Date: 11/10/2023  Interpreted By:  Francia Krishnamurthy and Avery Ross STUDY: BI US BREAST LIMITED BILATERAL; BI MAMMO RIGHT DIAGNOSTIC TOMOSYNTHESIS;  11/10/2023 10:15 am; 11/10/2023 11:04 am   ACCESSION NUMBER(S): [id number]; [id number]   ORDERING CLINICIAN: MONY MEDEROS   INDICATION: Preprocedural workup of bilateral breast masses and indeterminate left axillary lymph nodes.   COMPARISON: 11/03/2023 and 10/20/2023.   FINDINGS: ULTRASOUND:   RIGHT BREAST AND AXILLA: A targeted ultrasound was performed by a registered sonographer using elastography.   The previously noted mass at the 12 o'clock position 6 cm from the nipple represents benign fatty tissue. An oval simple cyst is incidentally identified at the 12 o'clock position 3 cm from the nipple. The cyst measures 0.5 x 0.1 x 0.3 cm. It is avascular and soft on elastography.   No suspicious sonographic abnormalities are visualized throughout the entire superior breast to correspond with the mammographic breast mass seen on mammogram  10/20/2023.   4 morphologically normal axillary lymph nodes are identified.   LEFT BREAST AND AXILLA: A targeted ultrasound was performed by a registered sonographer using elastography.   An irregular hypoechoic mass with angular margins is identified at the 11 o'clock position 7 cm from the nipple. It measures 0.5 x 0.2 x 0.4 cm. The mass is avascular and soft on elastography. It corresponds with the mammographic mass in the slightly central medial breast at far posterior depth.   An irregular non parallel hypoechoic mass with posterior shadowing is seen at the 1 o'clock position 7 cm from the nipple. It measures 0.4 x 0.3 x 0.3 cm. The mass is avascular and demonstrates intermediate stiffness on elastography. It corresponds with the mammographic mass in the superolateral breast at middle depth.   An oval parallel circumscribed hypoechoic mass with an internal septation is seen at the 2 o'clock position 8 cm from the nipple. The mass measures 0.7 x 0.2 x 0.4 cm. It is avascular and soft on elastography. This is favored to represent a probably benign complicated cyst.   4 morphologically normal axillary lymph nodes are identified.   MAMMOGRAPHY: 2D and tomosynthesis images of the right breast were reviewed at 1 mm slice thickness.   Density:  There are areas of scattered fibroglandular tissue.   The previously noted mammographic right breast mass without a definitive sonographic correlate persists in the superior and slightly medial breast at middle depth on additional imaging.       Suspicious left breast masses at the 11 o'clock and 1 o'clock position without axillary lymphadenopathy. An ultrasound-guided biopsy of both masses will be performed today.   Indeterminate right breast mass without a sonographic correlate. A stereotactic guided biopsy of this mass will be performed later today.   Probably benign left breast mass at the 2 o'clock position which may represent a complicated cyst. A six-month follow-up  ultrasound is recommended to document stability.   Method of Detection: Category Sdbt - 3D Screening   BI-RADS CATEGORY:   BI-RADS Category:  4 Suspicious. Recommendation:  Biopsy. Recommended Date:  Immediate. Laterality:  Bilateral.   For any future breast imaging appointments, please call [phone]).   I personally reviewed the images/study and I agree with the findings as stated by fellow physician, Dr. Dave Sam.   Signed by: Francia Krishnamurthy 11/10/2023 3:29 PM Dictation workstation:   [id number]      Assessment/Plan  Iris Barth is a 43 y.o. female with a Left breast cancer who will be getting bilateral skin sparing mastectomies and immediate reconstruction with TE and Galaflex/ADM.  I do not recommend nipple sparing given the degree of ptosis.  Will coordinate case for 1/8/24  1) Pre Op meds ordered:  Hibiclens, Gabapentin, scopolamine patch  2) Consents obtained  3) Post op teaching completed.  Drain care package given  4) Post Op meds ordered:  Tylenol 1 g q 8h, Gabapentin 300 mg q8h, celebrex 200 mg bid, Bactrim bid;  pain control discussed   5) case request submitted  6) the following ordered:  3 of Each:  Ararat Artoura plus smooth Ultra high tissue expander  [id number] 455 11.0 11.0 7.6  [id number] 535 12.0 12.0 8.0  Galaflex Lite 15 cm x 20 cm  [id number] --- 3 sheets  ADM  Large Contour medium thickness Perforated -- 3 sheets   -AlloDerm SELECT™ Regenerative Tissue Matrix - [id number]  Provena nayeli form large size, bilateral    Attending Attestation:  I, Ayah Edgar MD, personal performed the history, exam, and decision making on this patient.  A total of 90 mins were spent in patient counseling, review of medical records, and coordination of care.

## 2023-12-06 DIAGNOSIS — C50.812 MALIGNANT NEOPLASM OF OVERLAPPING SITES OF LEFT BREAST IN FEMALE, ESTROGEN RECEPTOR POSITIVE (MULTI): ICD-10-CM

## 2023-12-06 DIAGNOSIS — Z17.0 MALIGNANT NEOPLASM OF OVERLAPPING SITES OF LEFT BREAST IN FEMALE, ESTROGEN RECEPTOR POSITIVE (MULTI): ICD-10-CM

## 2023-12-06 LAB
ALBUMIN SERPL BCP-MCNC: 4.3 G/DL (ref 3.4–5)
ALP SERPL-CCNC: 66 U/L (ref 33–110)
ALT SERPL W P-5'-P-CCNC: 25 U/L (ref 7–45)
ANION GAP SERPL CALC-SCNC: 18 MMOL/L (ref 10–20)
AST SERPL W P-5'-P-CCNC: 21 U/L (ref 9–39)
BILIRUB SERPL-MCNC: 0.5 MG/DL (ref 0–1.2)
BUN SERPL-MCNC: 11 MG/DL (ref 6–23)
CALCIUM SERPL-MCNC: 9.8 MG/DL (ref 8.6–10.6)
CHLORIDE SERPL-SCNC: 107 MMOL/L (ref 98–107)
CO2 SERPL-SCNC: 19 MMOL/L (ref 21–32)
CREAT SERPL-MCNC: 0.77 MG/DL (ref 0.5–1.05)
GFR SERPL CREATININE-BSD FRML MDRD: >90 ML/MIN/1.73M*2
GLUCOSE SERPL-MCNC: 79 MG/DL (ref 74–99)
POTASSIUM SERPL-SCNC: 4 MMOL/L (ref 3.5–5.3)
PREALB SERPL-MCNC: 28 MG/DL (ref 18–40)
PROT SERPL-MCNC: 7.5 G/DL (ref 6.4–8.2)
SODIUM SERPL-SCNC: 140 MMOL/L (ref 136–145)

## 2023-12-07 LAB
ATRIAL RATE: 78 BPM
P AXIS: 39 DEGREES
P OFFSET: 187 MS
P ONSET: 144 MS
PR INTERVAL: 152 MS
Q ONSET: 220 MS
QRS COUNT: 13 BEATS
QRS DURATION: 76 MS
QT INTERVAL: 372 MS
QTC CALCULATION(BAZETT): 424 MS
QTC FREDERICIA: 406 MS
R AXIS: 58 DEGREES
T AXIS: 22 DEGREES
T OFFSET: 406 MS
VENTRICULAR RATE: 78 BPM

## 2023-12-09 ENCOUNTER — TELEMEDICINE (OUTPATIENT)
Dept: PRIMARY CARE | Facility: CLINIC | Age: 43
End: 2023-12-09
Payer: COMMERCIAL

## 2023-12-09 DIAGNOSIS — J01.10 ACUTE NON-RECURRENT FRONTAL SINUSITIS: ICD-10-CM

## 2023-12-09 DIAGNOSIS — B34.9 ACUTE VIRAL SYNDROME: Primary | ICD-10-CM

## 2023-12-09 DIAGNOSIS — U07.1 COVID-19 VIRUS INFECTION: ICD-10-CM

## 2023-12-09 PROBLEM — E66.9 OBESITY: Status: RESOLVED | Noted: 2023-11-08 | Resolved: 2023-12-09

## 2023-12-09 PROBLEM — J10.1 INFLUENZA A: Status: RESOLVED | Noted: 2023-10-30 | Resolved: 2023-12-09

## 2023-12-09 PROCEDURE — 99214 OFFICE O/P EST MOD 30 MIN: CPT | Performed by: FAMILY MEDICINE

## 2023-12-09 RX ORDER — AMOXICILLIN AND CLAVULANATE POTASSIUM 875; 125 MG/1; MG/1
875 TABLET, FILM COATED ORAL 2 TIMES DAILY
Qty: 20 TABLET | Refills: 0 | Status: SHIPPED | OUTPATIENT
Start: 2023-12-09 | End: 2023-12-09 | Stop reason: ALTCHOICE

## 2023-12-09 RX ORDER — NIRMATRELVIR AND RITONAVIR 300-100 MG
3 KIT ORAL 2 TIMES DAILY
Qty: 30 TABLET | Refills: 0 | Status: SHIPPED | OUTPATIENT
Start: 2023-12-09 | End: 2023-12-14

## 2023-12-09 ASSESSMENT — ENCOUNTER SYMPTOMS
SINUS PRESSURE: 1
HEADACHES: 1
SHORTNESS OF BREATH: 0
NECK PAIN: 0
SINUS COMPLAINT: 1
SWOLLEN GLANDS: 0
HOARSE VOICE: 1
COUGH: 1
DIAPHORESIS: 0
SORE THROAT: 1
CHILLS: 0

## 2023-12-09 NOTE — PROGRESS NOTES
Subjective   Patient ID: Iris Barth is a 43 y.o. female who presents for No chief complaint on file..  Today she is accompanied by alone.     Sinus Problem  This is a new problem. The current episode started in the past 7 days. The problem has been gradually worsening since onset. She is experiencing no pain. Associated symptoms include congestion, coughing, ear pain, headaches, a hoarse voice, sinus pressure and a sore throat. Pertinent negatives include no chills, diaphoresis, neck pain, shortness of breath, sneezing or swollen glands. Past treatments include oral decongestants, acetaminophen and nasal decongestants. The treatment provided no relief.       Review of Systems   Constitutional:  Negative for chills and diaphoresis.   HENT:  Positive for congestion, ear pain, hoarse voice, sinus pressure and sore throat. Negative for sneezing.    Respiratory:  Positive for cough. Negative for shortness of breath.    Musculoskeletal:  Negative for neck pain.   Neurological:  Positive for headaches.       Objective   There were no vitals taken for this visit.  BSA: There is no height or weight on file to calculate BSA.  Growth percentiles: Facility age limit for growth %lexii is 20 years. Facility age limit for growth %lexii is 20 years.     Physical Exam  Constitutional:       Appearance: Normal appearance.   Pulmonary:      Effort: Pulmonary effort is normal. No respiratory distress.      Breath sounds: Normal breath sounds.   Neurological:      Mental Status: She is alert.   Psychiatric:         Mood and Affect: Mood normal.         Behavior: Behavior normal.         Thought Content: Thought content normal.         Judgment: Judgment normal.         Assessment/Plan   Problem List Items Addressed This Visit             ICD-10-CM    Acute frontal sinusitis J01.10    Relevant Medications    nirmatrelvir-ritonavir (Paxlovid) 300 mg (150 mg x 2)-100 mg tablet therapy pack    Acute viral syndrome - Primary B34.9     Relevant Medications    nirmatrelvir-ritonavir (Paxlovid) 300 mg (150 mg x 2)-100 mg tablet therapy pack     Other Visit Diagnoses       Diagnosis Codes    COVID-19 virus infection     U07.1    Relevant Medications    nirmatrelvir-ritonavir (Paxlovid) 300 mg (150 mg x 2)-100 mg tablet therapy pack        Patient called back and ws covid positive. Would like to get treatment for this    - Advised patient to quarantine for 5 days from symptom onset  - Advised patient to wear a mask for 10 days   - Paxlovid sent to pharmacy  - Patient to hold Statin while on paxlvoid and 3 days after treatment complete   - Patient to report to ED if develops chest pain, SOB, or fevers unrelieved by tyelnol/advil    Call if any worse or if no improvement in 3-5 days  Increase fluids  OTC decongestants as needed  Mucinex FastMax or Tylenol Severe cold and Flu  Saline and OTC flonase as needed  Repeat covid in 1-2 days

## 2023-12-09 NOTE — PATIENT INSTRUCTIONS
- Advised patient to quarantine for 5 days from symptom onset  - Advised patient to wear a mask for 10 days   - Paxlovid sent to pharmacy  - Patient to hold Statin while on paxlvoid and 3 days after treatment complete   - Patient to report to ED if develops chest pain, SOB, or fevers unrelieved by tyelnol/advil  Call if any worse or if no improvement in 3-5 days  Increase fluids  OTC decongestants as needed  Mucinex FastMax or Tylenol Severe cold and Flu  Saline and OTC flonase as needed  Repeat covid in 1-2 days

## 2023-12-11 ENCOUNTER — TELEMEDICINE (OUTPATIENT)
Dept: PHARMACY | Facility: HOSPITAL | Age: 43
End: 2023-12-11
Payer: COMMERCIAL

## 2023-12-11 DIAGNOSIS — E66.9 CLASS 1 OBESITY WITHOUT SERIOUS COMORBIDITY WITH BODY MASS INDEX (BMI) OF 34.0 TO 34.9 IN ADULT, UNSPECIFIED OBESITY TYPE: ICD-10-CM

## 2023-12-11 DIAGNOSIS — E66.9 CLASS 1 OBESITY WITHOUT SERIOUS COMORBIDITY WITH BODY MASS INDEX (BMI) OF 34.0 TO 34.9 IN ADULT, UNSPECIFIED OBESITY TYPE: Primary | ICD-10-CM

## 2023-12-11 RX ORDER — SEMAGLUTIDE 2.4 MG/.75ML
2.4 INJECTION, SOLUTION SUBCUTANEOUS
Qty: 9 ML | Refills: 3 | Status: SHIPPED | OUTPATIENT
Start: 2023-12-11

## 2023-12-11 NOTE — PROGRESS NOTES
"Subjective   Patient ID: Iris Barth is a 43 y.o. female who presents for Weight Loss.    Referring Provider: Roxann Basurto DO     Ms. Barth returns to the pharmacy team for weight loss medication review. At initial pharmacy visit, patient was a candidate for Wegovy therapy after insurance approved prior authorization and review of her vitals showed BMI > 30. Patient has been able to tolerate Wegovy 2.4 mg for the past few months. She has noticed her weight go from 210 lbs in February, to 159 lbs she self reports today, a total of 50 lbs of weight loss since starting Wegovy. She is requesting further continuation today of the 2.4 mg weekly dose. She mentions being diagnosed with stage 1 breast cancer and will be having surgery to remove cancer. Was told to hold GLP1 for 1.5 weeks prior to surgery, but was cleared to remain on therapy while going through treatment. No other concerns.        Objective         2/6/2023     8:38 AM 11/9/2023     1:33 PM 11/21/2023    10:18 AM 12/5/2023     8:10 AM   Vitals   Systolic 152 145 147 120   Diastolic 78 95 101 90   Heart Rate 80 87 99 96   Temp  36 °C (96.8 °F) 36.5 °C (97.7 °F)    Height (in) 1.651 m (5' 5\")      Weight (lb) 210.4 167 165 168   BMI 35.01 kg/m2 27.79 kg/m2 27.46 kg/m2 27.96 kg/m2   BSA (m2) 2.09 m2 1.86 m2 1.85 m2 1.87 m2   Visit Report  Report Report    Report Report         Labs  Lab Results   Component Value Date    BILITOT 0.5 12/05/2023    CALCIUM 9.8 12/05/2023    CO2 19 (L) 12/05/2023     12/05/2023    CREATININE 0.77 12/05/2023    GLUCOSE 79 12/05/2023    ALKPHOS 66 12/05/2023    K 4.0 12/05/2023    PROT 7.5 12/05/2023     12/05/2023    AST 21 12/05/2023    ALT 25 12/05/2023    BUN 11 12/05/2023    ANIONGAP 18 12/05/2023    ALBUMIN 4.3 12/05/2023    GFRF >90 02/06/2023     Lab Results   Component Value Date    TRIG 107 02/06/2023    CHOL 216 (H) 02/06/2023    HDL 88.8 02/06/2023     Lab Results   Component Value Date    HGBA1C " 4.6 2023       Current Outpatient Medications on File Prior to Visit   Medication Sig Dispense Refill    acetaminophen (Tylenol Extra Strength) 500 mg tablet Take 2 tablets (1,000 mg) by mouth every 8 hours for 14 days. 30 tablet 0    aspirin 81 mg EC tablet Take 1 tablet (81 mg) by mouth once daily.      celecoxib (CeleBREX) 200 mg capsule Take 1 capsule (200 mg) by mouth 2 times a day for 14 days. 28 capsule 0    [] chlorhexidine (Hibiclens) 4 % external liquid Apply topically 1 time for 1 dose. Shower normally. Apply Hibiclens to surgical area from clavicle to hips making sure to apply under the breast and axilla as well as in the belly button.  Do not worry about the back. Do not wash off. 118 mL 0    gabapentin (Neurontin) 300 mg capsule Take 1 capsule (300 mg) by mouth every 8 hours for 14 days. 42 capsule 0    LORazepam (Ativan) 0.5 mg tablet Take 1 tablet (0.5 mg) by mouth every 8 hours if needed for anxiety.      nirmatrelvir-ritonavir (Paxlovid) 300 mg (150 mg x 2)-100 mg tablet therapy pack Take 3 tablets by mouth 2 times a day for 5 days. Follow the instructions on the package 30 tablet 0    semaglutide, weight loss, (Wegovy) 2.4 mg/0.75 mL pen injector Inject 2.4 mg under the skin every 7 days. 9 mL 1    sulfamethoxazole-trimethoprim (Bactrim DS) 800-160 mg tablet Take 1 tablet by mouth 2 times a day for 14 days. 28 tablet 0    [DISCONTINUED] amoxicillin-pot clavulanate (Augmentin) 875-125 mg tablet Take 1 tablet (875 mg) by mouth 2 times a day for 10 days. 20 tablet 0    [DISCONTINUED] Ocella 3-0.03 mg tablet Take 1 tablet by mouth once daily.       No current facility-administered medications on file prior to visit.        Assessment/Plan   Problem List Items Addressed This Visit       Class 1 obesity without serious comorbidity with body mass index (BMI) of 34.0 to 34.9 in adult   1. Continue taking Wegovy 2.4 mg SQ once weekly   2. Continue all other meds as prescribed  3. Pharmacy to  reach out as needed for therapy questions   4. Refill for wegovy submitted today        Clinical Pharmacist Follow-Up Date: prn  Type of encounter: Virtual      Provided counseling on lifestyle modifications, medications, and self-monitoring. Patient has no additional questions at this time. PCP was tasked if any medication or lab changes/recommendations need made.      Terrence Olvera, PharmD, BCPS

## 2023-12-12 ENCOUNTER — TELEPHONE (OUTPATIENT)
Dept: SURGICAL ONCOLOGY | Facility: CLINIC | Age: 43
End: 2023-12-12
Payer: COMMERCIAL

## 2024-01-02 DIAGNOSIS — Z17.0 MALIGNANT NEOPLASM OF OVERLAPPING SITES OF LEFT BREAST IN FEMALE, ESTROGEN RECEPTOR POSITIVE (MULTI): ICD-10-CM

## 2024-01-02 DIAGNOSIS — C50.812 MALIGNANT NEOPLASM OF OVERLAPPING SITES OF LEFT BREAST IN FEMALE, ESTROGEN RECEPTOR POSITIVE (MULTI): ICD-10-CM

## 2024-01-03 ENCOUNTER — TELEMEDICINE CLINICAL SUPPORT (OUTPATIENT)
Dept: PREADMISSION TESTING | Facility: HOSPITAL | Age: 44
End: 2024-01-03
Payer: COMMERCIAL

## 2024-01-03 DIAGNOSIS — Z17.0 MALIGNANT NEOPLASM OF OVERLAPPING SITES OF LEFT BREAST IN FEMALE, ESTROGEN RECEPTOR POSITIVE (MULTI): ICD-10-CM

## 2024-01-03 DIAGNOSIS — C50.812 MALIGNANT NEOPLASM OF OVERLAPPING SITES OF LEFT BREAST IN FEMALE, ESTROGEN RECEPTOR POSITIVE (MULTI): ICD-10-CM

## 2024-01-03 RX ORDER — DIPHENHYDRAMINE HCL 25 MG
25 TABLET ORAL AS NEEDED
COMMUNITY
End: 2024-05-15 | Stop reason: WASHOUT

## 2024-01-03 RX ORDER — DEXTROMETHORPHAN HYDROBROMIDE, GUAIFENESIN 5; 100 MG/5ML; MG/5ML
650 LIQUID ORAL EVERY 8 HOURS PRN
Status: ON HOLD | COMMUNITY
End: 2024-01-09 | Stop reason: HOSPADM

## 2024-01-03 RX ORDER — FAMOTIDINE 20 MG/1
TABLET, FILM COATED ORAL 2 TIMES DAILY PRN
COMMUNITY
End: 2024-01-23 | Stop reason: ALTCHOICE

## 2024-01-03 RX ORDER — DOCUSATE SODIUM 100 MG/1
100 CAPSULE, LIQUID FILLED ORAL 2 TIMES DAILY PRN
Status: ON HOLD | COMMUNITY
End: 2024-01-09 | Stop reason: HOSPADM

## 2024-01-03 RX ORDER — CHOLECALCIFEROL (VITAMIN D3) 25 MCG
1 TABLET ORAL DAILY
COMMUNITY

## 2024-01-03 RX ORDER — IBUPROFEN 200 MG
200 TABLET ORAL EVERY 6 HOURS PRN
Status: ON HOLD | COMMUNITY
End: 2024-01-09 | Stop reason: HOSPADM

## 2024-01-04 ENCOUNTER — PRE-ADMISSION TESTING (OUTPATIENT)
Dept: PREADMISSION TESTING | Facility: HOSPITAL | Age: 44
End: 2024-01-04
Payer: COMMERCIAL

## 2024-01-04 ENCOUNTER — LAB (OUTPATIENT)
Dept: LAB | Facility: LAB | Age: 44
End: 2024-01-04
Payer: COMMERCIAL

## 2024-01-04 VITALS
BODY MASS INDEX: 27.14 KG/M2 | HEIGHT: 66 IN | WEIGHT: 168.87 LBS | TEMPERATURE: 95.4 F | DIASTOLIC BLOOD PRESSURE: 82 MMHG | RESPIRATION RATE: 18 BRPM | SYSTOLIC BLOOD PRESSURE: 121 MMHG | OXYGEN SATURATION: 100 % | HEART RATE: 89 BPM

## 2024-01-04 DIAGNOSIS — Z17.0 MALIGNANT NEOPLASM OF OVERLAPPING SITES OF LEFT BREAST IN FEMALE, ESTROGEN RECEPTOR POSITIVE (MULTI): ICD-10-CM

## 2024-01-04 DIAGNOSIS — C50.812 MALIGNANT NEOPLASM OF OVERLAPPING SITES OF LEFT BREAST IN FEMALE, ESTROGEN RECEPTOR POSITIVE (MULTI): ICD-10-CM

## 2024-01-04 DIAGNOSIS — Z17.0 MALIGNANT NEOPLASM OF OVERLAPPING SITES OF LEFT BREAST IN FEMALE, ESTROGEN RECEPTOR POSITIVE (MULTI): Primary | ICD-10-CM

## 2024-01-04 DIAGNOSIS — C50.812 MALIGNANT NEOPLASM OF OVERLAPPING SITES OF LEFT BREAST IN FEMALE, ESTROGEN RECEPTOR POSITIVE (MULTI): Primary | ICD-10-CM

## 2024-01-04 DIAGNOSIS — Z01.818 PREPROCEDURAL EXAMINATION: ICD-10-CM

## 2024-01-04 LAB
ABO GROUP (TYPE) IN BLOOD: NORMAL
ALBUMIN SERPL BCP-MCNC: 4.1 G/DL (ref 3.4–5)
ALP SERPL-CCNC: 65 U/L (ref 33–110)
ALT SERPL W P-5'-P-CCNC: 23 U/L (ref 7–45)
ANION GAP SERPL CALC-SCNC: 13 MMOL/L (ref 10–20)
ANTIBODY SCREEN: NORMAL
AST SERPL W P-5'-P-CCNC: 21 U/L (ref 9–39)
BASOPHILS # BLD AUTO: 0.02 X10*3/UL (ref 0–0.1)
BASOPHILS NFR BLD AUTO: 0.3 %
BILIRUB SERPL-MCNC: 0.4 MG/DL (ref 0–1.2)
BUN SERPL-MCNC: 10 MG/DL (ref 6–23)
CALCIUM SERPL-MCNC: 9.6 MG/DL (ref 8.6–10.3)
CHLORIDE SERPL-SCNC: 105 MMOL/L (ref 98–107)
CO2 SERPL-SCNC: 21 MMOL/L (ref 21–32)
CREAT SERPL-MCNC: 0.77 MG/DL (ref 0.5–1.05)
EOSINOPHIL # BLD AUTO: 0.07 X10*3/UL (ref 0–0.7)
EOSINOPHIL NFR BLD AUTO: 1.1 %
ERYTHROCYTE [DISTWIDTH] IN BLOOD BY AUTOMATED COUNT: 13.2 % (ref 11.5–14.5)
GFR SERPL CREATININE-BSD FRML MDRD: >90 ML/MIN/1.73M*2
GLUCOSE SERPL-MCNC: 78 MG/DL (ref 74–99)
HCT VFR BLD AUTO: 39.4 % (ref 36–46)
HGB BLD-MCNC: 13.3 G/DL (ref 12–16)
IMM GRANULOCYTES # BLD AUTO: 0.02 X10*3/UL (ref 0–0.7)
IMM GRANULOCYTES NFR BLD AUTO: 0.3 % (ref 0–0.9)
LYMPHOCYTES # BLD AUTO: 2.97 X10*3/UL (ref 1.2–4.8)
LYMPHOCYTES NFR BLD AUTO: 46.6 %
MCH RBC QN AUTO: 30.9 PG (ref 26–34)
MCHC RBC AUTO-ENTMCNC: 33.8 G/DL (ref 32–36)
MCV RBC AUTO: 91 FL (ref 80–100)
MONOCYTES # BLD AUTO: 0.43 X10*3/UL (ref 0.1–1)
MONOCYTES NFR BLD AUTO: 6.8 %
NEUTROPHILS # BLD AUTO: 2.86 X10*3/UL (ref 1.2–7.7)
NEUTROPHILS NFR BLD AUTO: 44.9 %
NRBC BLD-RTO: 0 /100 WBCS (ref 0–0)
PLATELET # BLD AUTO: 362 X10*3/UL (ref 150–450)
POTASSIUM SERPL-SCNC: 4 MMOL/L (ref 3.5–5.3)
PROT SERPL-MCNC: 7.2 G/DL (ref 6.4–8.2)
RBC # BLD AUTO: 4.31 X10*6/UL (ref 4–5.2)
RH FACTOR (ANTIGEN D): NORMAL
SODIUM SERPL-SCNC: 135 MMOL/L (ref 136–145)
WBC # BLD AUTO: 6.4 X10*3/UL (ref 4.4–11.3)

## 2024-01-04 PROCEDURE — 86850 RBC ANTIBODY SCREEN: CPT

## 2024-01-04 PROCEDURE — 80053 COMPREHEN METABOLIC PANEL: CPT

## 2024-01-04 PROCEDURE — 36415 COLL VENOUS BLD VENIPUNCTURE: CPT

## 2024-01-04 PROCEDURE — 85025 COMPLETE CBC W/AUTO DIFF WBC: CPT

## 2024-01-04 PROCEDURE — 99203 OFFICE O/P NEW LOW 30 MIN: CPT | Performed by: NURSE PRACTITIONER

## 2024-01-04 PROCEDURE — 86900 BLOOD TYPING SEROLOGIC ABO: CPT

## 2024-01-04 PROCEDURE — 86901 BLOOD TYPING SEROLOGIC RH(D): CPT

## 2024-01-04 ASSESSMENT — ENCOUNTER SYMPTOMS
CONSTITUTIONAL NEGATIVE: 1
NECK NEGATIVE: 1
RESPIRATORY NEGATIVE: 1
CARDIOVASCULAR NEGATIVE: 1
ARTHRALGIAS: 1
GASTROINTESTINAL NEGATIVE: 1

## 2024-01-04 NOTE — CPM/PAT H&P
CPM/PAT Evaluation       Name: Iris Barth (Iris Barth)  /Age: 1980/43 y.o.     SURGEON :DR DANG MARROQUIN   Surgeon : DR ORALIA NEVAREZ     Surgery, Date, and Length:  Bilateral Breast Skin Sparing Mastectomy Left Axillary [facility] Lymph Node Biopsy Breast Tissue Expander Insertion Torso Implant Therapeutic Insertion  2024  HPI:  This a 43 y.o. fe-male who presents for presurgical evaluation for.  for above mentioned procedure   .Pt went for an intial mammogram and found to have suspicious mass with overlap .Biopsy positive for invasive ductal carcinoma . After discussion of the risks and benefits with Dr. Marroquin the patient elects to proceed with the planned procedure.       Past Medical History:   Diagnosis Date    Acid reflux     pepcis prn    Anxiety     Lorazepam as needed    Breast CA (CMS/HCC)     Constipation     COVID-19 2023    was given Paxlovid, fully recovered    DVT (deep vein thrombosis) in pregnancy     proved after left knee scope,  left calf , SQ heparin briefly with no further issues, requests post op Heparin for this procedure    Heart murmur     asymptomatic       Past Surgical History:   Procedure Laterality Date    BELT ABDOMINOPLASTY      BI STEREOTACTIC GUIDED BREAST RIGHT LOCALIZATION AND BIOPSY Right 11/10/2023    BI STEREOSTATIC GUIDED BREAST RIGHT LOCALIZATION AND BIOPSY 11/10/2023 Francia Krishnamurthy MD [facility]    BI US GUIDED BREAST LOCALIZATION AND BIOPSY LEFT Left 11/10/2023    BI US GUIDED BREAST LOCALIZATION AND BIOPSY LEFT 11/10/2023 Francia Krishnamurthy MD [facility]    KNEE SURGERY  2015    Knee Surgery, scopex2   Anesthesia History    PONV     Pt denies any past history of anesthetic complications such as PONV, awareness, prolonged sedation, dental damage, aspiration, cardiac arrest, difficult intubation, difficult I.V. access or unexpected hospital admissions.  NO malignant hyperthermia and or pseudo cholinesterase deficiency.    The patient is not  a  Jew and will accept blood and blood products if medically indicated.   No history of blood transfusions .Type and screen  sent.     Social History  Social History     Substance and Sexual Activity   Drug Use Never      Social History     Substance and Sexual Activity   Alcohol Use Yes    Comment: wine or cocktail 2x/month      Social History     Tobacco Use   Smoking Status Never    Passive exposure: Never   Smokeless Tobacco Never          Family History   Problem Relation Name Age of Onset    No Known Problems Mother      Blood clot Father      Skin cancer Father      No Known Problems Sister      No Known Problems Sister      Breast cancer Maternal Grandmother      Breast cancer Paternal Grandmother      Cancer Other grandparent        No Known Allergies    Prior to Admission medications    Medication Sig Start Date End Date Taking? Authorizing Provider   acetaminophen (Tylenol 8 HOUR) 650 mg ER tablet Take 1 tablet (650 mg) by mouth every 8 hours if needed for mild pain (1 - 3). Do not crush, chew, or split.    Historical Provider, MD   aspirin 81 mg EC tablet Take 1 tablet (81 mg) by mouth once daily. 12/20/17   Historical Provider, MD   bran/gum/fib/marcel/psyl/kelp/pec (FIBER 6 ORAL) Take by mouth 2 times a day as needed.    Historical Provider, MD   cholecalciferol, vitamin D3, (VITAMIN D3 ORAL) Take by mouth once daily.    Historical Provider, MD   cyanocobalamin, vitamin B-12, (VITAMIN B-12 ORAL) Take by mouth once daily.    Historical Provider, MD   diphenhydrAMINE (Sominex) 25 mg tablet Take 1 tablet (25 mg) by mouth if needed for sleep.    Historical Provider, MD   diphenhydrAMINE-acetaminophen (Tylenol PM)  mg per tablet Take 1 tablet by mouth as needed at bedtime for sleep.    Historical Provider, MD   docusate sodium (Colace) 100 mg capsule Take 1 capsule (100 mg) by mouth 2 times a day as needed for constipation.    Historical Provider, MD   famotidine (Pepcid) 20 mg tablet Take by  mouth 2 times a day as needed for heartburn.    Historical Provider, MD   gabapentin (Neurontin) 300 mg capsule Take 1 capsule (300 mg) by mouth every 8 hours for 14 days. 12/5/23 12/19/23  Ayah Edgar MD   ibuprofen 200 mg tablet Take 1 tablet (200 mg) by mouth every 6 hours if needed for mild pain (1 - 3).    Historical Provider, MD   LORazepam (Ativan) 0.5 mg tablet Take 1 tablet (0.5 mg) by mouth every 8 hours if needed for anxiety.    Historical Provider, MD   semaglutide, weight loss, (Wegovy) 2.4 mg/0.75 mL pen injector Inject 2.4 mg under the skin every 7 days. 12/11/23   Roxann Basurto, DO   ZINC ACETATE ORAL Use in the mouth or throat if needed.    Historical Provider, MD CHOI ROS:   Constitutional:   neg    Neuro/Psych:   Eyes:   Ears:   Nose:   neg    Mouth:   neg    Throat:   neg    Neck:   neg    Cardio:   neg    Respiratory:   neg    Endocrine:   GI:   neg    :   neg    Musculoskeletal:    arthralgias (LEFT KNEE)  Hematologic:   neg    Skin:  neg        Physical Exam  Vitals reviewed.   Constitutional:       Appearance: Normal appearance.   HENT:      Head: Normocephalic.      Mouth/Throat:      Mouth: Mucous membranes are moist.   Eyes:      Extraocular Movements: Extraocular movements intact.      Pupils: Pupils are equal, round, and reactive to light.   Cardiovascular:      Rate and Rhythm: Normal rate.      Pulses: Normal pulses.      Heart sounds: Normal heart sounds.   Pulmonary:      Effort: Pulmonary effort is normal.      Breath sounds: Normal breath sounds.   Musculoskeletal:         General: Normal range of motion.      Cervical back: Normal range of motion.   Skin:     General: Skin is warm and dry.   Neurological:      Mental Status: She is alert and oriented to person, place, and time.   Psychiatric:         Behavior: Behavior normal.      Comments: ANXIOUS           PAT AIRWAY:   Airway:     Mallampati::  II   LEFT LOWER MOLAR BONDED   /82   Pulse 89   Temp 35.2 °C (95.4  "°F)   Resp 18   Ht 1.676 m (5' 6\")   Wt 76.6 kg (168 lb 14 oz)   SpO2 100%   BMI 27.26 kg/m²     Lab Results   Component Value Date    WBC 6.9 12/05/2023    HGB 13.4 12/05/2023    HCT 39.8 12/05/2023    MCV 91 12/05/2023     12/05/2023     Results from last 7 days   Lab Units 01/04/24  0908   SODIUM mmol/L 135*   POTASSIUM mmol/L 4.0   CHLORIDE mmol/L 105   CO2 mmol/L 21   BUN mg/dL 10   CREATININE mg/dL 0.77   CALCIUM mg/dL 9.6   PROTEIN TOTAL g/dL 7.2   BILIRUBIN TOTAL mg/dL 0.4   ALK PHOS U/L 65   ALT U/L 23   AST U/L 21   GLUCOSE mg/dL 78       ASSESSMENT/PLAN    Patient is a 43year-old  scheduled for Bilateral Breast Skin Sparing Mastectomy Left Axillary [facility] Lymph Node Biopsy Breast Tissue Expander Insertion Torso Implant Therapeutic Insertion  with Dr. Rojas   on  1/8/2024 .  CARDIOVASCULAR:  RCRI score / Risk: The patients score is 0 based on history . Per ACC/AHA guidelines this places her  at  3.9% risk for MACE undergoing a low  risk procedure . The patient has the following risk factors:  Functional Capacity: The patients exercise tolerance is  4  METS. This is based on the patients abililty to ascend a flight of stairs  and walk 2 block w/o chest pain or other anginal equivalents.  . Patient denies  active cardiac symptoms or anginal equivalents .      PULMONARY:  The patient has the following factors that place them at increased risk of perioperative pulmonary complications;BMI greater than 27/hx snoring //greater than 2.5 hour procedure.  Postoperatively the patient would benefit from early pulmonary toilet/incentive spirometry q 1-2 hours while awake/pulse oximetry/cautious use of respiratory depressant medications such as opioids/elevate the HOB/oral hygiene.      DVT:  CAPRINI SCORE=15  The patient has the following factors that increase her  Risk for thrombus formation ; Virchow's triad ,age>40,bmi>25, malignancy , hx dvt , Surgical procedure >2 hrs  procedure " .    Recommendations: DVT prophylaxis  per Dr. Rojas protocol . SCD's, REGGIE's, and early ambulation are recommended. Heparin or LMWH is recommended for the very high risk .      Risk assessment complete.  Patient is scheduled for  low  surgical risk procedure.  Patient is considered an acceptable  risk to proceed with the planned procedure.      Preoperative medication instructions were provided and reviewed with the patient.  Any additional testing or evaluation was explained to the patient.  Nothing by mouth instructions were discussed and patient's questions were answered prior to conclusion to this encounter.  Patient verbalized understanding of preoperative instructions given in preadmission testing; discharge instructions available in EMR.

## 2024-01-04 NOTE — PREPROCEDURE INSTRUCTIONS
Medication List            Accurate as of January 4, 2024  8:27 AM. Always use your most recent med list.                acetaminophen 650 mg ER tablet  Commonly known as: Tylenol 8 HOUR  Notes to patient: Take if needed      aspirin 81 mg EC tablet  Medication Adjustments for Surgery: Take morning of surgery with sip of water, no other fluids     diphenhydrAMINE 25 mg tablet  Commonly known as: Sominex  Medication Adjustments for Surgery: Continue until night before surgery     diphenhydrAMINE-acetaminophen  mg per tablet  Commonly known as: Tylenol PM  Medication Adjustments for Surgery: Continue until night before surgery     docusate sodium 100 mg capsule  Commonly known as: Colace  Medication Adjustments for Surgery: Continue until night before surgery     famotidine 20 mg tablet  Commonly known as: Pepcid  Medication Adjustments for Surgery: Take morning of surgery with sip of water, no other fluids     FIBER 6 ORAL  Medication Adjustments for Surgery: Continue until night before surgery     gabapentin 300 mg capsule  Commonly known as: Neurontin  Take 1 capsule (300 mg) by mouth every 8 hours for 14 days.  Medication Adjustments for Surgery: Take morning of surgery with sip of water, no other fluids     ibuprofen 200 mg tablet  Medication Adjustments for Surgery: Stop 7 days before surgery     LORazepam 0.5 mg tablet  Commonly known as: Ativan  Medication Adjustments for Surgery: Take morning of surgery with sip of water, no other fluids     VITAMIN B-12 ORAL  Medication Adjustments for Surgery: Continue until night before surgery     VITAMIN D3 ORAL  Medication Adjustments for Surgery: Continue until night before surgery     Wegovy 2.4 mg/0.75 mL pen injector  Generic drug: semaglutide (weight loss)  Inject 2.4 mg under the skin every 7 days.  Medication Adjustments for Surgery: Stop 7 days before surgery     ZINC ACETATE ORAL  Medication Adjustments for Surgery: Continue until night before  surgery                   CONTACT SURGEON'S OFFICE IF YOU DEVELOP:  * Fever = 100.4 F   * New respiratory symptoms (e.g. cough, shortness of breath, respiratory distress, sore throat)  * Recent loss of taste or smell  *Flu like symptoms such as headache, fatigue or gastrointestinal symptoms  * You develop any open sores, shingles, burning or painful urination   AND/OR:  * You no longer wish to have the surgery.  * Any other personal circumstances change that may lead to the need to cancel or defer this surgery.  *You were admitted to any hospital within one week of your planned procedure.    SMOKING:  *Quitting smoking can make a huge difference to your health and recovery from surgery.    *If you need help with quitting, call [phone]-QUIT-NOW.    THE DAY BEFORE SURGERY:  *Do not eat any food after midnight the night before surgery.   *You are permitted to drink clear liquids (i.e. water, black coffee, tea, clear broth, apple juice) up to 2 hours before your surgery.  DIABETICS:  Please check fasting blood sugar  upon waking up.  If fasting sugar is <80 mg/dl, please drink 100ml/3oz of apple juice no later than 2 hours prior to surgery.      SURGICAL TIME  *You will be contacted between 2 p.m. and 6 p.m. the business day before your surgery with your arrival time.  *If you haven't received a call by 6pm, call [phone].  *Scheduled surgery times may change and you will be notified if this occurs-check your personal voicemail for any updates.    ON THE MORNING OF SURGERY:  *Wear comfortable, loose fitting clothing.   *Do not use moisturizers, creams, lotions or perfume.  *All jewelry and valuables should be left at home.  *Prosthetic devices such as contact lenses, hearing aids, dentures, eyelash extensions, hairpins and body piercing must be removed before surgery.    BRING WITH YOU:  *Photo ID and insurance card  *Current list of medicines and allergies  *Pacemaker/Defibrillator/Heart stent cards  *CPAP machine  and mask  *Slings/splints/crutches  *Copy of your complete Advanced Directive/DHPOA-if applicable  *Neurostimulator implant remote    PARKING AND ARRIVAL:  *Check in at the Main Entrance desk and let them know you are here for surgery.  *You will be directed to the 2nd floor surgical waiting area.    AFTER OUTPATIENT SURGERY:  *A responsible adult MUST accompany you at the time of discharge and stay with you for 24 hours after your surgery.  *You may NOT drive yourself home after surgery.  *You may use a taxi or ride sharing service ([employer], Uber) to return home ONLY if you are accompanied by a friend or family member.  *Instructions for resuming your medications will be provided by your surgeon.      YOU HAVE REVIEWED THE MEDICATIONS ON THIS SHEET AND YOU VERIFY THESE ARE ALL THE MEDICATIONS AND OVER THE COUNTER MEDICATIONS THAT YOU TAKE .

## 2024-01-07 ENCOUNTER — ANESTHESIA EVENT (OUTPATIENT)
Dept: OPERATING ROOM | Facility: HOSPITAL | Age: 44
End: 2024-01-07
Payer: COMMERCIAL

## 2024-01-08 ENCOUNTER — HOSPITAL ENCOUNTER (OUTPATIENT)
Dept: RADIOLOGY | Facility: HOSPITAL | Age: 44
Setting detail: OUTPATIENT SURGERY
Discharge: HOME | End: 2024-01-08
Payer: COMMERCIAL

## 2024-01-08 ENCOUNTER — HOSPITAL ENCOUNTER (OUTPATIENT)
Facility: HOSPITAL | Age: 44
Setting detail: OBSERVATION
Discharge: HOME | End: 2024-01-09
Attending: SURGERY | Admitting: PHYSICIAN ASSISTANT
Payer: COMMERCIAL

## 2024-01-08 ENCOUNTER — ANESTHESIA (OUTPATIENT)
Dept: OPERATING ROOM | Facility: HOSPITAL | Age: 44
End: 2024-01-08
Payer: COMMERCIAL

## 2024-01-08 DIAGNOSIS — C50.812 MALIGNANT NEOPLASM OF OVERLAPPING SITES OF LEFT BREAST IN FEMALE, ESTROGEN RECEPTOR POSITIVE (MULTI): ICD-10-CM

## 2024-01-08 DIAGNOSIS — R92.8 ABNORMAL FINDING ON BREAST IMAGING: Primary | ICD-10-CM

## 2024-01-08 DIAGNOSIS — Z17.0 MALIGNANT NEOPLASM OF OVERLAPPING SITES OF LEFT BREAST IN FEMALE, ESTROGEN RECEPTOR POSITIVE (MULTI): ICD-10-CM

## 2024-01-08 DIAGNOSIS — G89.18 POST-OP PAIN: ICD-10-CM

## 2024-01-08 DIAGNOSIS — Z17.1 MALIGNANT NEOPLASM OF UPPER-OUTER QUADRANT OF LEFT BREAST IN FEMALE, ESTROGEN RECEPTOR NEGATIVE (MULTI): ICD-10-CM

## 2024-01-08 DIAGNOSIS — C50.412 MALIGNANT NEOPLASM OF UPPER-OUTER QUADRANT OF LEFT BREAST IN FEMALE, ESTROGEN RECEPTOR NEGATIVE (MULTI): ICD-10-CM

## 2024-01-08 PROBLEM — Z90.10 ACUTE PAIN AFTER MASTECTOMY: Status: ACTIVE | Noted: 2024-01-08

## 2024-01-08 PROBLEM — K21.9 GASTROESOPHAGEAL REFLUX DISEASE: Status: ACTIVE | Noted: 2024-01-08

## 2024-01-08 LAB
ABO GROUP (TYPE) IN BLOOD: NORMAL
RH FACTOR (ANTIGEN D): NORMAL

## 2024-01-08 PROCEDURE — 3600000004 HC OR TIME - INITIAL BASE CHARGE - PROCEDURE LEVEL FOUR: Performed by: SURGERY

## 2024-01-08 PROCEDURE — 2500000005 HC RX 250 GENERAL PHARMACY W/O HCPCS

## 2024-01-08 PROCEDURE — 2500000005 HC RX 250 GENERAL PHARMACY W/O HCPCS: Performed by: SURGERY

## 2024-01-08 PROCEDURE — 3430000001 HC RX 343 DIAGNOSTIC RADIOPHARMACEUTICALS: Performed by: SURGERY

## 2024-01-08 PROCEDURE — 15777 ACELLULAR DERM MATRIX IMPLT: CPT | Performed by: SURGERY

## 2024-01-08 PROCEDURE — 19357 TISS XPNDR PLMT BRST RCNSTJ: CPT | Performed by: PHYSICIAN ASSISTANT

## 2024-01-08 PROCEDURE — 38792 RA TRACER ID OF SENTINL NODE: CPT

## 2024-01-08 PROCEDURE — A4217 STERILE WATER/SALINE, 500 ML: HCPCS | Performed by: SURGERY

## 2024-01-08 PROCEDURE — 88342 IMHCHEM/IMCYTCHM 1ST ANTB: CPT | Performed by: PATHOLOGY

## 2024-01-08 PROCEDURE — 2500000005 HC RX 250 GENERAL PHARMACY W/O HCPCS: Performed by: ANESTHESIOLOGY

## 2024-01-08 PROCEDURE — 2780000003 HC OR 278 NO HCPCS: Performed by: SURGERY

## 2024-01-08 PROCEDURE — 7100000002 HC RECOVERY ROOM TIME - EACH INCREMENTAL 1 MINUTE: Performed by: SURGERY

## 2024-01-08 PROCEDURE — 2500000004 HC RX 250 GENERAL PHARMACY W/ HCPCS (ALT 636 FOR OP/ED): Performed by: SURGERY

## 2024-01-08 PROCEDURE — A9520 TC99 TILMANOCEPT DIAG 0.5MCI: HCPCS | Performed by: SURGERY

## 2024-01-08 PROCEDURE — 7100000001 HC RECOVERY ROOM TIME - INITIAL BASE CHARGE: Performed by: SURGERY

## 2024-01-08 PROCEDURE — 3700000002 HC GENERAL ANESTHESIA TIME - EACH INCREMENTAL 1 MINUTE: Performed by: SURGERY

## 2024-01-08 PROCEDURE — 88305 TISSUE EXAM BY PATHOLOGIST: CPT | Performed by: PATHOLOGY

## 2024-01-08 PROCEDURE — 38792 RA TRACER ID OF SENTINL NODE: CPT | Performed by: SURGERY

## 2024-01-08 PROCEDURE — 88307 TISSUE EXAM BY PATHOLOGIST: CPT | Mod: TC,SUR,AHULAB | Performed by: SURGERY

## 2024-01-08 PROCEDURE — 38900 IO MAP OF SENT LYMPH NODE: CPT | Performed by: SURGERY

## 2024-01-08 PROCEDURE — A19357 PR BREAST RECONSTRUC W TISS EXPANDR: Performed by: NURSE ANESTHETIST, CERTIFIED REGISTERED

## 2024-01-08 PROCEDURE — 19303 MAST SIMPLE COMPLETE: CPT | Performed by: SURGERY

## 2024-01-08 PROCEDURE — 3700000001 HC GENERAL ANESTHESIA TIME - INITIAL BASE CHARGE: Performed by: SURGERY

## 2024-01-08 PROCEDURE — 2500000005 HC RX 250 GENERAL PHARMACY W/O HCPCS: Performed by: NURSE ANESTHETIST, CERTIFIED REGISTERED

## 2024-01-08 PROCEDURE — 2500000004 HC RX 250 GENERAL PHARMACY W/ HCPCS (ALT 636 FOR OP/ED): Performed by: NURSE ANESTHETIST, CERTIFIED REGISTERED

## 2024-01-08 PROCEDURE — 36415 COLL VENOUS BLD VENIPUNCTURE: CPT | Performed by: SURGERY

## 2024-01-08 PROCEDURE — 38525 BIOPSY/REMOVAL LYMPH NODES: CPT | Performed by: SURGERY

## 2024-01-08 PROCEDURE — 88307 TISSUE EXAM BY PATHOLOGIST: CPT | Performed by: PATHOLOGY

## 2024-01-08 PROCEDURE — 2500000004 HC RX 250 GENERAL PHARMACY W/ HCPCS (ALT 636 FOR OP/ED): Performed by: ANESTHESIOLOGY

## 2024-01-08 PROCEDURE — 88341 IMHCHEM/IMCYTCHM EA ADD ANTB: CPT | Performed by: PATHOLOGY

## 2024-01-08 PROCEDURE — 2500000001 HC RX 250 WO HCPCS SELF ADMINISTERED DRUGS (ALT 637 FOR MEDICARE OP): Performed by: SURGERY

## 2024-01-08 PROCEDURE — 2720000007 HC OR 272 NO HCPCS: Performed by: SURGERY

## 2024-01-08 PROCEDURE — G0378 HOSPITAL OBSERVATION PER HR: HCPCS

## 2024-01-08 PROCEDURE — 88305 TISSUE EXAM BY PATHOLOGIST: CPT | Mod: TC,SUR,AHULAB | Performed by: SURGERY

## 2024-01-08 PROCEDURE — C1889 IMPLANT/INSERT DEVICE, NOC: HCPCS | Performed by: SURGERY

## 2024-01-08 PROCEDURE — 19357 TISS XPNDR PLMT BRST RCNSTJ: CPT | Performed by: SURGERY

## 2024-01-08 PROCEDURE — 3600000009 HC OR TIME - EACH INCREMENTAL 1 MINUTE - PROCEDURE LEVEL FOUR: Performed by: SURGERY

## 2024-01-08 PROCEDURE — A19357 PR BREAST RECONSTRUC W TISS EXPANDR: Performed by: ANESTHESIOLOGY

## 2024-01-08 DEVICE — IMPLANTABLE DEVICE: Type: IMPLANTABLE DEVICE | Site: BREAST | Status: FUNCTIONAL

## 2024-01-08 RX ORDER — HYDRALAZINE HYDROCHLORIDE 20 MG/ML
5 INJECTION INTRAMUSCULAR; INTRAVENOUS EVERY 30 MIN PRN
Status: DISCONTINUED | OUTPATIENT
Start: 2024-01-08 | End: 2024-01-08 | Stop reason: HOSPADM

## 2024-01-08 RX ORDER — SODIUM CHLORIDE, SODIUM LACTATE, POTASSIUM CHLORIDE, CALCIUM CHLORIDE 600; 310; 30; 20 MG/100ML; MG/100ML; MG/100ML; MG/100ML
100 INJECTION, SOLUTION INTRAVENOUS CONTINUOUS
Status: DISCONTINUED | OUTPATIENT
Start: 2024-01-08 | End: 2024-01-09 | Stop reason: HOSPADM

## 2024-01-08 RX ORDER — CEFAZOLIN SODIUM 2 G/100ML
2 INJECTION, SOLUTION INTRAVENOUS ONCE
Status: DISCONTINUED | OUTPATIENT
Start: 2024-01-08 | End: 2024-01-08 | Stop reason: HOSPADM

## 2024-01-08 RX ORDER — LIDOCAINE HYDROCHLORIDE 20 MG/ML
INJECTION, SOLUTION INFILTRATION; PERINEURAL AS NEEDED
Status: DISCONTINUED | OUTPATIENT
Start: 2024-01-08 | End: 2024-01-08

## 2024-01-08 RX ORDER — DIPHENHYDRAMINE HYDROCHLORIDE 50 MG/ML
12.5 INJECTION INTRAMUSCULAR; INTRAVENOUS ONCE AS NEEDED
Status: DISCONTINUED | OUTPATIENT
Start: 2024-01-08 | End: 2024-01-08 | Stop reason: HOSPADM

## 2024-01-08 RX ORDER — FENTANYL CITRATE 50 UG/ML
INJECTION, SOLUTION INTRAMUSCULAR; INTRAVENOUS AS NEEDED
Status: DISCONTINUED | OUTPATIENT
Start: 2024-01-08 | End: 2024-01-08

## 2024-01-08 RX ORDER — ACETAMINOPHEN 650 MG/1
SUPPOSITORY RECTAL AS NEEDED
Status: DISCONTINUED | OUTPATIENT
Start: 2024-01-08 | End: 2024-01-08 | Stop reason: HOSPADM

## 2024-01-08 RX ORDER — ALBUTEROL SULFATE 0.83 MG/ML
2.5 SOLUTION RESPIRATORY (INHALATION) ONCE AS NEEDED
Status: DISCONTINUED | OUTPATIENT
Start: 2024-01-08 | End: 2024-01-08 | Stop reason: HOSPADM

## 2024-01-08 RX ORDER — ROCURONIUM BROMIDE 10 MG/ML
INJECTION, SOLUTION INTRAVENOUS AS NEEDED
Status: DISCONTINUED | OUTPATIENT
Start: 2024-01-08 | End: 2024-01-08

## 2024-01-08 RX ORDER — HYDROMORPHONE HYDROCHLORIDE 1 MG/ML
INJECTION, SOLUTION INTRAMUSCULAR; INTRAVENOUS; SUBCUTANEOUS AS NEEDED
Status: DISCONTINUED | OUTPATIENT
Start: 2024-01-08 | End: 2024-01-08

## 2024-01-08 RX ORDER — MIDAZOLAM HYDROCHLORIDE 1 MG/ML
INJECTION INTRAMUSCULAR; INTRAVENOUS AS NEEDED
Status: DISCONTINUED | OUTPATIENT
Start: 2024-01-08 | End: 2024-01-08

## 2024-01-08 RX ORDER — PROPOFOL 10 MG/ML
INJECTION, EMULSION INTRAVENOUS AS NEEDED
Status: DISCONTINUED | OUTPATIENT
Start: 2024-01-08 | End: 2024-01-08

## 2024-01-08 RX ORDER — ACETAMINOPHEN 650 MG/1
650 SUPPOSITORY RECTAL ONCE
Status: DISCONTINUED | OUTPATIENT
Start: 2024-01-08 | End: 2024-01-08 | Stop reason: SDUPTHER

## 2024-01-08 RX ORDER — CELECOXIB 200 MG/1
200 CAPSULE ORAL DAILY
Qty: 14 CAPSULE | Refills: 0 | Status: SHIPPED | OUTPATIENT
Start: 2024-01-08 | End: 2024-01-22

## 2024-01-08 RX ORDER — HEPARIN SODIUM 5000 [USP'U]/ML
5000 INJECTION, SOLUTION INTRAVENOUS; SUBCUTANEOUS ONCE
Status: COMPLETED | OUTPATIENT
Start: 2024-01-08 | End: 2024-01-08

## 2024-01-08 RX ORDER — LIDOCAINE HYDROCHLORIDE 10 MG/ML
INJECTION INFILTRATION; PERINEURAL AS NEEDED
Status: DISCONTINUED | OUTPATIENT
Start: 2024-01-08 | End: 2024-01-08 | Stop reason: HOSPADM

## 2024-01-08 RX ORDER — ACETAMINOPHEN 325 MG/1
975 TABLET ORAL ONCE
Status: COMPLETED | OUTPATIENT
Start: 2024-01-08 | End: 2024-01-08

## 2024-01-08 RX ORDER — OXYCODONE HYDROCHLORIDE 5 MG/1
5 TABLET ORAL EVERY 4 HOURS PRN
Status: DISCONTINUED | OUTPATIENT
Start: 2024-01-08 | End: 2024-01-08 | Stop reason: HOSPADM

## 2024-01-08 RX ORDER — GABAPENTIN 300 MG/1
300 CAPSULE ORAL EVERY 8 HOURS
Qty: 42 CAPSULE | Refills: 0 | Status: SHIPPED | OUTPATIENT
Start: 2024-01-08 | End: 2024-01-09 | Stop reason: HOSPADM

## 2024-01-08 RX ORDER — ISOSULFAN BLUE 50 MG/5ML
INJECTION, SOLUTION SUBCUTANEOUS AS NEEDED
Status: DISCONTINUED | OUTPATIENT
Start: 2024-01-08 | End: 2024-01-08 | Stop reason: HOSPADM

## 2024-01-08 RX ORDER — SODIUM CHLORIDE, SODIUM LACTATE, POTASSIUM CHLORIDE, CALCIUM CHLORIDE 600; 310; 30; 20 MG/100ML; MG/100ML; MG/100ML; MG/100ML
INJECTION, SOLUTION INTRAVENOUS CONTINUOUS PRN
Status: DISCONTINUED | OUTPATIENT
Start: 2024-01-08 | End: 2024-01-08

## 2024-01-08 RX ORDER — SODIUM CHLORIDE 0.9 G/100ML
IRRIGANT IRRIGATION AS NEEDED
Status: DISCONTINUED | OUTPATIENT
Start: 2024-01-08 | End: 2024-01-08 | Stop reason: HOSPADM

## 2024-01-08 RX ORDER — ONDANSETRON HYDROCHLORIDE 2 MG/ML
4 INJECTION, SOLUTION INTRAVENOUS ONCE AS NEEDED
Status: COMPLETED | OUTPATIENT
Start: 2024-01-08 | End: 2024-01-08

## 2024-01-08 RX ORDER — MIDAZOLAM HYDROCHLORIDE 1 MG/ML
INJECTION, SOLUTION INTRAMUSCULAR; INTRAVENOUS AS NEEDED
Status: DISCONTINUED | OUTPATIENT
Start: 2024-01-08 | End: 2024-01-08

## 2024-01-08 RX ORDER — SULFAMETHOXAZOLE AND TRIMETHOPRIM 800; 160 MG/1; MG/1
1 TABLET ORAL 2 TIMES DAILY
Qty: 28 TABLET | Refills: 0 | Status: SHIPPED | OUTPATIENT
Start: 2024-01-08 | End: 2024-01-22

## 2024-01-08 RX ORDER — HEPARIN SODIUM 5000 [USP'U]/ML
5000 INJECTION, SOLUTION INTRAVENOUS; SUBCUTANEOUS EVERY 12 HOURS
Status: CANCELLED | OUTPATIENT
Start: 2024-01-08

## 2024-01-08 RX ORDER — CEFAZOLIN 1 G/1
INJECTION, POWDER, FOR SOLUTION INTRAVENOUS AS NEEDED
Status: DISCONTINUED | OUTPATIENT
Start: 2024-01-08 | End: 2024-01-08

## 2024-01-08 RX ORDER — SULFAMETHOXAZOLE AND TRIMETHOPRIM 800; 160 MG/1; MG/1
320 TABLET ORAL 2 TIMES DAILY
Status: DISCONTINUED | OUTPATIENT
Start: 2024-01-09 | End: 2024-01-09 | Stop reason: HOSPADM

## 2024-01-08 RX ORDER — ACETAMINOPHEN 500 MG
1000 TABLET ORAL EVERY 8 HOURS PRN
Qty: 30 TABLET | Refills: 0 | Status: SHIPPED | OUTPATIENT
Start: 2024-01-08 | End: 2024-01-22

## 2024-01-08 RX ORDER — MIDAZOLAM HYDROCHLORIDE 1 MG/ML
2 INJECTION INTRAMUSCULAR; INTRAVENOUS ONCE AS NEEDED
Status: COMPLETED | OUTPATIENT
Start: 2024-01-08 | End: 2024-01-08

## 2024-01-08 RX ORDER — ACETAMINOPHEN 650 MG/1
650 SUPPOSITORY RECTAL ONCE
Status: DISCONTINUED | OUTPATIENT
Start: 2024-01-08 | End: 2024-01-08 | Stop reason: HOSPADM

## 2024-01-08 RX ORDER — GABAPENTIN 300 MG/1
300 CAPSULE ORAL EVERY 8 HOURS
Qty: 42 CAPSULE | Refills: 0 | Status: SHIPPED | OUTPATIENT
Start: 2024-01-08 | End: 2024-01-08 | Stop reason: SDUPTHER

## 2024-01-08 RX ORDER — SODIUM CHLORIDE 9 MG/ML
100 INJECTION, SOLUTION INTRAVENOUS CONTINUOUS
Status: DISCONTINUED | OUTPATIENT
Start: 2024-01-08 | End: 2024-01-09

## 2024-01-08 RX ORDER — ONDANSETRON HYDROCHLORIDE 2 MG/ML
INJECTION, SOLUTION INTRAVENOUS AS NEEDED
Status: DISCONTINUED | OUTPATIENT
Start: 2024-01-08 | End: 2024-01-08

## 2024-01-08 RX ADMIN — PROPOFOL 150 MG: 10 INJECTION, EMULSION INTRAVENOUS at 13:04

## 2024-01-08 RX ADMIN — ACETAMINOPHEN 975 MG: 325 TABLET ORAL at 11:05

## 2024-01-08 RX ADMIN — FENTANYL CITRATE 50 MCG: 50 INJECTION, SOLUTION INTRAMUSCULAR; INTRAVENOUS at 13:04

## 2024-01-08 RX ADMIN — SODIUM CHLORIDE, POTASSIUM CHLORIDE, SODIUM LACTATE AND CALCIUM CHLORIDE: 600; 310; 30; 20 INJECTION, SOLUTION INTRAVENOUS at 12:47

## 2024-01-08 RX ADMIN — ROCURONIUM BROMIDE 10 MG: 10 INJECTION, SOLUTION INTRAVENOUS at 16:47

## 2024-01-08 RX ADMIN — CEFAZOLIN 2 G: 1 INJECTION, POWDER, FOR SOLUTION INTRAMUSCULAR; INTRAVENOUS at 12:55

## 2024-01-08 RX ADMIN — HYDROMORPHONE HYDROCHLORIDE 0.25 MG: 1 INJECTION, SOLUTION INTRAMUSCULAR; INTRAVENOUS; SUBCUTANEOUS at 17:19

## 2024-01-08 RX ADMIN — LIDOCAINE HYDROCHLORIDE 40 MG: 20 INJECTION, SOLUTION INFILTRATION; PERINEURAL at 13:05

## 2024-01-08 RX ADMIN — LIDOCAINE HYDROCHLORIDE 40 MG: 20 INJECTION, SOLUTION INFILTRATION; PERINEURAL at 13:04

## 2024-01-08 RX ADMIN — HYDROMORPHONE HYDROCHLORIDE 0.25 MG: 1 INJECTION, SOLUTION INTRAMUSCULAR; INTRAVENOUS; SUBCUTANEOUS at 15:36

## 2024-01-08 RX ADMIN — ROCURONIUM BROMIDE 10 MG: 10 INJECTION, SOLUTION INTRAVENOUS at 15:36

## 2024-01-08 RX ADMIN — HYDROMORPHONE HYDROCHLORIDE 0.25 MG: 1 INJECTION, SOLUTION INTRAMUSCULAR; INTRAVENOUS; SUBCUTANEOUS at 14:31

## 2024-01-08 RX ADMIN — HYDROMORPHONE HYDROCHLORIDE 0.25 MG: 1 INJECTION, SOLUTION INTRAMUSCULAR; INTRAVENOUS; SUBCUTANEOUS at 13:51

## 2024-01-08 RX ADMIN — ROCURONIUM BROMIDE 10 MG: 10 INJECTION, SOLUTION INTRAVENOUS at 14:07

## 2024-01-08 RX ADMIN — MIDAZOLAM HYDROCHLORIDE 2 MG: 1 INJECTION, SOLUTION INTRAMUSCULAR; INTRAVENOUS at 12:33

## 2024-01-08 RX ADMIN — HYDROMORPHONE HYDROCHLORIDE 0.25 MG: 1 INJECTION, SOLUTION INTRAMUSCULAR; INTRAVENOUS; SUBCUTANEOUS at 16:15

## 2024-01-08 RX ADMIN — HYDROMORPHONE HYDROCHLORIDE 0.25 MG: 1 INJECTION, SOLUTION INTRAMUSCULAR; INTRAVENOUS; SUBCUTANEOUS at 16:47

## 2024-01-08 RX ADMIN — SODIUM CHLORIDE, POTASSIUM CHLORIDE, SODIUM LACTATE AND CALCIUM CHLORIDE 100 ML/HR: 600; 310; 30; 20 INJECTION, SOLUTION INTRAVENOUS at 23:28

## 2024-01-08 RX ADMIN — PROPOFOL 50 MG: 10 INJECTION, EMULSION INTRAVENOUS at 13:09

## 2024-01-08 RX ADMIN — Medication 6.25 MG: at 21:22

## 2024-01-08 RX ADMIN — TILMANOCEPT 0.6 MILLICURIE: KIT at 13:16

## 2024-01-08 RX ADMIN — Medication: at 22:30

## 2024-01-08 RX ADMIN — MIDAZOLAM HYDROCHLORIDE 2 MG: 1 INJECTION, SOLUTION INTRAMUSCULAR; INTRAVENOUS at 12:55

## 2024-01-08 RX ADMIN — ROCURONIUM BROMIDE 20 MG: 10 INJECTION, SOLUTION INTRAVENOUS at 14:31

## 2024-01-08 RX ADMIN — FENTANYL CITRATE 50 MCG: 50 INJECTION, SOLUTION INTRAMUSCULAR; INTRAVENOUS at 12:55

## 2024-01-08 RX ADMIN — CEFAZOLIN 1 G: 1 INJECTION, POWDER, FOR SOLUTION INTRAMUSCULAR; INTRAVENOUS at 16:57

## 2024-01-08 RX ADMIN — ROCURONIUM BROMIDE 10 MG: 10 INJECTION, SOLUTION INTRAVENOUS at 16:15

## 2024-01-08 RX ADMIN — HEPARIN SODIUM 5000 UNITS: 5000 INJECTION INTRAVENOUS; SUBCUTANEOUS at 11:28

## 2024-01-08 RX ADMIN — ROCURONIUM BROMIDE 10 MG: 10 INJECTION, SOLUTION INTRAVENOUS at 13:43

## 2024-01-08 RX ADMIN — HYDROMORPHONE HYDROCHLORIDE 0.25 MG: 1 INJECTION, SOLUTION INTRAMUSCULAR; INTRAVENOUS; SUBCUTANEOUS at 13:32

## 2024-01-08 RX ADMIN — MIDAZOLAM HYDROCHLORIDE 2 MG: 1 INJECTION, SOLUTION INTRAMUSCULAR; INTRAVENOUS at 12:06

## 2024-01-08 RX ADMIN — ONDANSETRON 4 MG: 2 INJECTION INTRAMUSCULAR; INTRAVENOUS at 17:59

## 2024-01-08 RX ADMIN — ONDANSETRON 4 MG: 2 INJECTION INTRAMUSCULAR; INTRAVENOUS at 21:15

## 2024-01-08 RX ADMIN — HYDROMORPHONE HYDROCHLORIDE 0.5 MG: 1 INJECTION, SOLUTION INTRAMUSCULAR; INTRAVENOUS; SUBCUTANEOUS at 21:37

## 2024-01-08 RX ADMIN — ROCURONIUM BROMIDE 10 MG: 10 INJECTION, SOLUTION INTRAVENOUS at 15:07

## 2024-01-08 RX ADMIN — ROCURONIUM BROMIDE 20 MG: 10 INJECTION, SOLUTION INTRAVENOUS at 13:32

## 2024-01-08 RX ADMIN — DEXAMETHASONE SODIUM PHOSPHATE 4 MG: 4 INJECTION, SOLUTION INTRAMUSCULAR; INTRAVENOUS at 17:19

## 2024-01-08 RX ADMIN — SODIUM CHLORIDE, POTASSIUM CHLORIDE, SODIUM LACTATE AND CALCIUM CHLORIDE: 600; 310; 30; 20 INJECTION, SOLUTION INTRAVENOUS at 15:08

## 2024-01-08 RX ADMIN — SUGAMMADEX 200 MG: 100 INJECTION, SOLUTION INTRAVENOUS at 19:52

## 2024-01-08 RX ADMIN — HYDROMORPHONE HYDROCHLORIDE 0.25 MG: 1 INJECTION, SOLUTION INTRAMUSCULAR; INTRAVENOUS; SUBCUTANEOUS at 14:25

## 2024-01-08 SDOH — HEALTH STABILITY: MENTAL HEALTH: CURRENT SMOKER: 0

## 2024-01-08 ASSESSMENT — PAIN SCALES - GENERAL
PAINLEVEL_OUTOF10: 3
PAINLEVEL_OUTOF10: 0 - NO PAIN
PAINLEVEL_OUTOF10: 6
PAINLEVEL_OUTOF10: 8

## 2024-01-08 ASSESSMENT — PAIN - FUNCTIONAL ASSESSMENT
PAIN_FUNCTIONAL_ASSESSMENT: 0-10

## 2024-01-08 ASSESSMENT — COLUMBIA-SUICIDE SEVERITY RATING SCALE - C-SSRS
6. HAVE YOU EVER DONE ANYTHING, STARTED TO DO ANYTHING, OR PREPARED TO DO ANYTHING TO END YOUR LIFE?: NO
2. HAVE YOU ACTUALLY HAD ANY THOUGHTS OF KILLING YOURSELF?: NO
1. IN THE PAST MONTH, HAVE YOU WISHED YOU WERE DEAD OR WISHED YOU COULD GO TO SLEEP AND NOT WAKE UP?: NO

## 2024-01-08 ASSESSMENT — PAIN DESCRIPTION - DESCRIPTORS: DESCRIPTORS: ACHING

## 2024-01-08 NOTE — DISCHARGE INSTRUCTIONS
POST OPERATIVE INFORMATION FOR BREAST SURGERY  1.   You have a special bra and padding which should stay on for 2 days following surgery. After 2 days, remove the bra and you can sponge bathe. Please do not get your wound vac wet. You should continue to wear a supportive bra until you see me in the office. It should also be worn at night. Do NOT wear any bras with an underwire as this puts pressure on the reconstruction.  2.  You may take a sponge bath 48 hours after surgery. Please do not swim or submerse yourself in the bathtub prior to our follow-up visit.  3.   You may also use ice (20 minutes at a time) to help with pain and swelling under the arm.   4.  You may notice that you have some bruising in the area of your surgery or there may be small amounts of blood on the dressing. This is common. The bruising will resolve over time.   5.  For the next 2 weeks, no lifting greater than 10 pounds on the surgical side, and no strenuous activity such as jogging and household chores. You may start the arm and shoulder exercises 24 hours after surgery.   6.  Please follow the drain care instructions and record the outputs twice daily.  No driving until your drains have been removed.  7.  Please take the Tylenol, Celebrex and Gabapentin as prescribed for 14 days.  If you have any questions, please do not hesitate to contact us.

## 2024-01-08 NOTE — H&P
History Of Present Illness  Iris Barth is a 43 y.o. female presenting with multicentric left breast cancer, clinical stage 1A.     Past Medical History  Past Medical History:   Diagnosis Date    Acid reflux     pepcis prn    Anxiety     Lorazepam as needed    Breast CA (CMS/HCC)     Constipation     COVID-19 12/09/2023    was given Paxlovid, fully recovered    DVT (deep vein thrombosis) in pregnancy     proved after left knee scope,  left calf , SQ heparin briefly with no further issues, requests post op Heparin for this procedure    Heart murmur     asymptomatic       Surgical History  Past Surgical History:   Procedure Laterality Date    BELT ABDOMINOPLASTY      BI STEREOTACTIC GUIDED BREAST RIGHT LOCALIZATION AND BIOPSY Right 11/10/2023    BI STEREOSTATIC GUIDED BREAST RIGHT LOCALIZATION AND BIOPSY 11/10/2023 Francia Krishnamurthy MD [facility]    BI US GUIDED BREAST LOCALIZATION AND BIOPSY LEFT Left 11/10/2023    BI US GUIDED BREAST LOCALIZATION AND BIOPSY LEFT 11/10/2023 Francia Krishnamurthy MD [facility]    KNEE SURGERY  08/31/2015    Knee Surgery, scopex2        Social History  She reports that she has never smoked. She has never been exposed to tobacco smoke. She has never used smokeless tobacco. She reports current alcohol use. She reports that she does not use drugs.    Family History  Family History   Problem Relation Name Age of Onset    No Known Problems Mother      Blood clot Father      Skin cancer Father      No Known Problems Sister      No Known Problems Sister      Breast cancer Maternal Grandmother      Breast cancer Paternal Grandmother      Cancer Other grandparent         Allergies  Patient has no known allergies.    Review of Systems    REVIEW OF SYSTEMS:     General: Denies fevers and chills. Denies fatigue, weight loss/gain, weakness, malaise.  HEENT: Denies changes in hearing or vision. Denies neck pain.  Cardiovascular: Denies chest pain or palpitations. Denies lower extremity edema.  Respiratory:  "Denies cough, wheezing, and shortness of breath.  GI: Denies nausea, vomiting, diarrhea, and abdominal pain.  Neurologic: Denies headaches and seizures. Denies dizziness and numbness.  Skin: Denies skin lesions, rashes, itching.  Breast: See history of present illness.  Musculoskeletal: Denies joint pain and stiffness. Denies limb weakness.  Heme/Lymphatic: Denies swollen glands. Denies easy bleeding or bruising. Denies blood clots.   Psychiatric: Anxious and tearful.  All others reviewed and negative with the exception of history of present illness.        Physical Exam  General: Otherwise healthy appearing. No acute distress.      HEENT: Conjunctiva well-colored, sclera non-icteric. Neck supple, trachea midline. No lymphadenopathy or thyromegaly.      Cardiovascular: Regular rate and rhythm.     Respiratory: Clear to auscultation bilaterally.      Breast: Exam performed in the sitting and supine positions. Right breast: 1:00, 8 cm FN well healed core needle biopsy site. No skin or nipple changes. Left breast: 11:00, 11 cm FN well healed core needle biopsy site. 2:00, 6 cm FN well healed core needle biopsy site. No skin or nipple changes. Grade 2 Ptosis. 32 DD.      Abdomen: Soft, non-tender, non-distended.     Extremities: Atraumatic, no edema.      Neurologic: Motor and sensory grossly intact. Alert and oriented.      Lymphatic: No axillary, supraclavicular, or infraclavicular lymphadenopathy bilaterally.      Last Recorded Vitals  Blood pressure (!) 150/99, pulse 65, temperature 37.2 °C (99 °F), temperature source Temporal, resp. rate 16, height 1.676 m (5' 6\"), weight 75.8 kg (167 lb 1.7 oz), SpO2 100 %.      Assessment/Plan   Principal Problem:    Malignant neoplasm of overlapping sites of left breast in female, estrogen receptor positive (CMS/HCC)  Active Problems:    Gastroesophageal reflux disease      Bilateral skin sparing mastectomy, left axillary SLNB, immediate reconstruction (MARS Edgar)   "       Vaishnavi Rojas MD

## 2024-01-08 NOTE — ANESTHESIA PROCEDURE NOTES
Airway  Date/Time: 1/8/2024 1:08 PM  Urgency: elective    Airway not difficult    Staffing  Performed: CRNA   Authorized by: Ge Lane MD    Performed by: CARMELITA Hoang-JASON  Patient location during procedure: OR    Indications and Patient Condition  Indications for airway management: anesthesia  Spontaneous ventilation: present  Sedation level: minimal  Preoxygenated: yes  Patient position: sniffing  MILS maintained throughout  Mask difficulty assessment: 1 - vent by mask  Planned trial extubation    Final Airway Details  Final airway type: endotracheal airway      Successful airway: ETT  Cuffed: yes   Successful intubation technique: direct laryngoscopy  Facilitating devices/methods: intubating stylet  Endotracheal tube insertion site: oral  Blade: Nick  Blade size: #3  ETT size (mm): 7.0  Cormack-Lehane Classification: grade I - full view of glottis  Placement verified by: chest auscultation and capnometry   Measured from: lips  Number of attempts at approach: 1  Number of other approaches attempted: 0

## 2024-01-08 NOTE — ANESTHESIA PREPROCEDURE EVALUATION
Patient: Iris Barth    Procedure Information       Date/Time: 01/08/24 1230    Procedures:       Bilateral Breast Skin Sparing Mastectomy (Bilateral)      Left Axillary [place] Lymph Node Biopsy (Left)      Breast Tissue Expander Insertion (Bilateral)      Torso Implant Therapeutic Insertion (Bilateral)    Location: [facility] OR 05 / [facility] OR    Surgeons: Vaishnavi Rojas MD; Ayah Edgar MD            Relevant Problems   Anesthesia (within normal limits)      Endocrine   (+) Class 1 obesity without serious comorbidity with body mass index (BMI) of 34.0 to 34.9 in adult      GI   (+) Gastroesophageal reflux disease (Minimal)      Neuro/Psych   (+) Anxiety      Hematology  DVT after knee scope 8-10 years ago      Musculoskeletal   (+) Chondromalacia of patella      Infectious Disease   (+) Acute viral syndrome       Clinical information reviewed:   Tobacco  Allergies  Meds   Med Hx  Surg Hx  OB Status  Fam Hx  Soc   Hx        NPO Detail:  No data recorded     Physical Exam    Airway  Mallampati: I  TM distance: >3 FB  Neck ROM: full     Cardiovascular    Dental    Pulmonary    Abdominal            Anesthesia Plan    ASA 2     general     The patient is not a current smoker.    Anesthetic plan and risks discussed with patient.    Plan discussed with CRNA.

## 2024-01-08 NOTE — OP NOTE
Bilateral Skin Sparing Mastectomy (B), Left Axillary [place] Lymph Node Biopsy (L) Operative Note     Date: 2024  OR Location: [facility]    Name: Iris Barth, : 1980, Age: 43 y.o., MRN: [id number], Sex: female    Diagnosis  Pre-op Diagnosis     * Malignant neoplasm of overlapping sites of left breast in female, estrogen receptor positive (CMS/HCC) [C50.812, Z17.0] Post-op Diagnosis     * Malignant neoplasm of overlapping sites of left breast in female, estrogen receptor positive (CMS/HCC) [C50.812, Z17.0]     Procedures  1. Bilateral skin sparing mastectomy    2.  Injection of radiotracer for intraoperative lymphatic mapping.  3.  Injection of isosulfan blue for intraoperative lymphatic mapping.  4.  Left axillary sentinel lymph node biopsy with dual tracer intraoperative lymphatic mapping.    Surgeons   Panel 1:     * Vaishnavi Rojas - Primary  Panel 2:     * Ayah Edgar - Primary    Resident/Fellow/Other Assistant:  Surgeon(s) and Role:    Procedure Summary  Anesthesia: General  ASA: II  Anesthesia Staff: Anesthesiologist: Ge Lane MD; Michael Daniels MD  CRNA: CARMELITA Hoang-CRNA  C-AA: JOHN Telles  Estimated Blood Loss: 150 mL  Intra-op Medications:   Medication Name Total Dose   lidocaine (Xylocaine) 10 mg/mL (1 %) injection 20 mL   isosulfan blue (Lumphazurin) 1 % injection 5 mL   sodium chloride 0.9 % irrigation solution 1,000 mL   BUPivacaine-EPINEPHrine (Marcaine w/EPI) 40 mL in sodium chloride 0.9% 80 mL syringe Cannot be calculated              Anesthesia Record               Intraprocedure I/O Totals          Intake    LR infusion 1000.00 mL    Total Intake 1000 mL       Output    Est. Blood Loss 150 mL    Total Output 150 mL       Net    Net Volume 850 mL          Specimen:   ID Type Source Tests Collected by Time   1 : RIGHT BREAST - DOUBLE SHORT STITCH 12 O'CLOCK; DOUBLE LONG STITCH AXILLARY TAIL Tissue BREAST MASTECTOMY RIGHT SURGICAL PATHOLOGY EXAM Vaishnavi BOB  MD Bob 1/8/2024 1347   2 : LEFT BREAST - DOUBLE SHORT STITCH 12 O'CLOCK; DOUBLE LONG STITCH AXILLARY TAIL Tissue BREAST MASTECTOMY LEFT SURGICAL PATHOLOGY EXAM Vaishnavi Rojas MD 1/8/2024 1530   3 : LEFT AXILLARY SENTINEL LYMPH NODE #1, PALPABLE Tissue SENTINEL LYMPH NODE BREAST LEFT SURGICAL PATHOLOGY EXAM Vaishnavi Rojas MD 1/8/2024 1624   4 : LEFT AXILLARY SENTINEL LYMPH NODE #2, HOT Tissue SENTINEL LYMPH NODE BREAST LEFT SURGICAL PATHOLOGY EXAM Vaishnavi Rojas MD 1/8/2024 1626   5 : LEFT AXILLARY SENTINEL LYMPH NODE #3, HOT Tissue SENTINEL LYMPH NODE BREAST LEFT SURGICAL PATHOLOGY EXAM Vaishnavi Rojas MD 1/8/2024 1629        Staff:   Circulator: Asmita HATCH RN  Relief Circulator: Lorraine Lombardo RN; Sis Eagle RN  Scrub Person: Abel Lacey; Ros Sullivan; Alesha Poe; Gem Marie; Sis Eagle RN     INDICATIONS FOR PROCEDURE:    Iris Barth is a 43 year-old female who presented with left breast masses x2 for which core needle biopsy showed invasive ductal carcinoma, ER+/TN+/HER2-, [id number], clinical stage 1A. We met in surgical consultation and I personally reviewed her imaging and pathology. She strongly desired bilateral mastectomy, and I also recommended left axillary sentinel lymph node biopsy. She met with Dr. Ayah Edgar in plastic surgery and elected immediate allogenic reconstruction. Following a detailed discussion regarding risks, benefits, and alternatives, informed consent was obtained, and we agreed to proceed.     DESCRIPTION OF PROCEDURE:    The patient was brought to the operating room and positioned supine on the OR table.  Following patient identification and a time-out procedure, SCDs were applied, and antibiotics were administered. General anesthesia was initiated. I personally injected technetium-99 and 5 mL of isosulfan blue into the patient's left breast for dual tracer intraoperative lymphatic mapping.  The breast was massaged for 5 minutes.  I used the [employer]  to confirm uptake of the radiotracer in the breast and axilla. The operative field was prepped and draped in a standard sterile fashion.      We turned our attention to the prophylactic right breast. A periareolar skin sparing incision had been marked by Dr. Edgar in the pre-operative area. 1% lidocaine was injected subcutaneously.  The skin was incised sharply.  Dissection was carried down through the skin and subcutaneous tissues to identify the anterior mastectomy plane between the subcutaneous tissue and the anterior breast parenchyma. Mastectomy flaps were created in this plane superiorly up to the level of the clavicle, medially to the border of the sternum, inferiorly to the inframammary fold, and laterally to the anterior border of the latissimus dorsi. The breast was then removed from the pectoralis muscle including the pectoralis fascia. The specimen was oriented with a double short stitch at 12 o'clock, a double long stitch at the axillary tail. It was labeled as right breast and the weight was 665g, reflecting her significant degree of macromastia. It was sent to pathology for permanent section. The mastectomy site was copiously irrigated with warm sterile saline solution.  Hemostasis was achieved.    We turned our attention to the left breast. A matching incision had been marked. 1% lidocaine was injected subcutaneously.  The skin was incised sharply.  Dissection was carried down through the skin and subcutaneous tissues to identify the anterior mastectomy plane between the subcutaneous tissue and the anterior breast parenchyma. Mastectomy flaps were created in this plane superiorly up to the level of the clavicle, medially to the border of the sternum, inferiorly to the inframammary fold, and laterally to the anterior border of the latissimus dorsi. The breast was then removed from the pectoralis muscle including the pectoralis fascia. The specimen was oriented with a double short stitch at 12 o'clock,  a double long stitch at the axillary tail. It was labeled as left breast and the weight was 701g, reflecting her significant degree of macromastia. It was sent to pathology for permanent section. The mastectomy site was copiously irrigated with warm sterile saline solution.  Hemostasis was achieved.    We then turned our attention to the left axilla for sentinel lymph node biopsy.  The pectoralis minor was freed from the pectoralis major, and dissection was carried out medially to enter the axillary space. I identified an area of radiotracer uptake using the Neoprobe and dissected further to isolate a lymph node. Small clips were used on branching vessels with careful attention to hemostasis. The lymph node was excised; it contained 2 nodes which were . Right axillary sentinel lymph node #1 was hot; #2 was palpable only.  A third node was similarly removed; hot only. All lymph nodes were sent to pathology for permanent section.  There were no additional visibly blue lymph nodes, no lymph nodes with increased radiotracer uptake, and no palpably suspicious nodes.  The axilla was copiously irrigated with warm sterile saline solution.  Hemostasis was achieved.       This concluded my portion of the procedure. The patient tolerated this well. There were no  immediate complications.  All counts were correct.  Estimated blood loss was 150 cc.     Dr. Edgar then joined us in the operating room and the patient was in her care for the reconstructive portion of the procedure. Please see her separately dictated operative note for details.     Of note, this skin sparing mastectomy was performed through very small periareolar incisions, and the superior flaps were long due to significant ptosis. This required the use of long instruments, an extended Bovie cautery device, and special long lighted retractors. It required extra time and attention amounting to an additional 1 hour  of operative time and indicting the use of  modifier 22.     [place] Node Biopsy for Breast Cancer  Operation performed with curative intent Yes   Tracer(s) used to identify sentinel nodes in the upfront surgery (non-neoadjuvant) setting Dye and Radioactive tracer   Tracer(s) used to identify sentinel nodes in the neoadjuvant setting N/A   All nodes (colored or non-colored) present at the end of a dye-filled lymphatic channel were removed N/A   All significantly radioactive nodes were removed Yes   All palpably suspicious nodes were removed Yes   Biopsy-proven positive nodes marked with clips prior to chemotherapy were identified and removed N/A       Attending Attestation: I was present and scrubbed for the entire procedure.    Vaishnavi Rojas  Phone Number: [phone]

## 2024-01-09 VITALS
SYSTOLIC BLOOD PRESSURE: 113 MMHG | DIASTOLIC BLOOD PRESSURE: 65 MMHG | BODY MASS INDEX: 26.86 KG/M2 | TEMPERATURE: 97.7 F | RESPIRATION RATE: 18 BRPM | WEIGHT: 167.11 LBS | HEIGHT: 66 IN | OXYGEN SATURATION: 97 % | HEART RATE: 89 BPM

## 2024-01-09 PROCEDURE — 2500000004 HC RX 250 GENERAL PHARMACY W/ HCPCS (ALT 636 FOR OP/ED): Performed by: PHYSICIAN ASSISTANT

## 2024-01-09 PROCEDURE — 2500000005 HC RX 250 GENERAL PHARMACY W/O HCPCS: Performed by: PHYSICIAN ASSISTANT

## 2024-01-09 PROCEDURE — G0378 HOSPITAL OBSERVATION PER HR: HCPCS

## 2024-01-09 PROCEDURE — 99231 SBSQ HOSP IP/OBS SF/LOW 25: CPT | Performed by: PHYSICIAN ASSISTANT

## 2024-01-09 PROCEDURE — 2500000001 HC RX 250 WO HCPCS SELF ADMINISTERED DRUGS (ALT 637 FOR MEDICARE OP): Performed by: PHYSICIAN ASSISTANT

## 2024-01-09 PROCEDURE — 2500000004 HC RX 250 GENERAL PHARMACY W/ HCPCS (ALT 636 FOR OP/ED)

## 2024-01-09 PROCEDURE — 9420000001 HC RT PATIENT EDUCATION 5 MIN

## 2024-01-09 RX ORDER — CELECOXIB 200 MG/1
200 CAPSULE ORAL 2 TIMES DAILY
Status: DISCONTINUED | OUTPATIENT
Start: 2024-01-09 | End: 2024-01-09 | Stop reason: HOSPADM

## 2024-01-09 RX ORDER — SYRING-NEEDL,DISP,INSUL,0.3 ML 29 G X1/2"
297 SYRINGE, EMPTY DISPOSABLE MISCELLANEOUS ONCE AS NEEDED
Status: DISCONTINUED | OUTPATIENT
Start: 2024-01-09 | End: 2024-01-09 | Stop reason: HOSPADM

## 2024-01-09 RX ORDER — TRAMADOL HYDROCHLORIDE 50 MG/1
50 TABLET ORAL EVERY 6 HOURS PRN
Qty: 15 TABLET | Refills: 0 | Status: SHIPPED | OUTPATIENT
Start: 2024-01-09 | End: 2024-01-14

## 2024-01-09 RX ORDER — PROMETHAZINE HYDROCHLORIDE 25 MG/1
25 TABLET ORAL EVERY 6 HOURS PRN
Status: DISCONTINUED | OUTPATIENT
Start: 2024-01-09 | End: 2024-01-09 | Stop reason: HOSPADM

## 2024-01-09 RX ORDER — ASPIRIN 81 MG/1
81 TABLET ORAL DAILY
Qty: 30 TABLET | Refills: 0 | Status: SHIPPED | OUTPATIENT
Start: 2024-01-10 | End: 2024-02-01 | Stop reason: WASHOUT

## 2024-01-09 RX ORDER — GABAPENTIN 300 MG/1
300 CAPSULE ORAL EVERY 8 HOURS SCHEDULED
Qty: 42 CAPSULE | Refills: 0 | Status: SHIPPED | OUTPATIENT
Start: 2024-01-09 | End: 2024-01-24 | Stop reason: SDUPTHER

## 2024-01-09 RX ORDER — PROMETHAZINE HYDROCHLORIDE 25 MG/1
25 SUPPOSITORY RECTAL EVERY 12 HOURS PRN
Status: DISCONTINUED | OUTPATIENT
Start: 2024-01-09 | End: 2024-01-09 | Stop reason: RX

## 2024-01-09 RX ORDER — TRAMADOL HYDROCHLORIDE 50 MG/1
50 TABLET ORAL EVERY 6 HOURS PRN
Status: DISCONTINUED | OUTPATIENT
Start: 2024-01-09 | End: 2024-01-09 | Stop reason: HOSPADM

## 2024-01-09 RX ORDER — DOCUSATE SODIUM 100 MG/1
100 CAPSULE, LIQUID FILLED ORAL 2 TIMES DAILY
Status: DISCONTINUED | OUTPATIENT
Start: 2024-01-09 | End: 2024-01-09 | Stop reason: HOSPADM

## 2024-01-09 RX ORDER — MAGNESIUM HYDROXIDE 2400 MG/10ML
10 SUSPENSION ORAL DAILY PRN
Status: DISCONTINUED | OUTPATIENT
Start: 2024-01-09 | End: 2024-01-09 | Stop reason: HOSPADM

## 2024-01-09 RX ORDER — ONDANSETRON 4 MG/1
4 TABLET, FILM COATED ORAL EVERY 8 HOURS PRN
Qty: 12 TABLET | Refills: 0 | Status: SHIPPED | OUTPATIENT
Start: 2024-01-09 | End: 2024-01-13

## 2024-01-09 RX ORDER — ACETAMINOPHEN 325 MG/1
650 TABLET ORAL EVERY 6 HOURS SCHEDULED
Status: DISCONTINUED | OUTPATIENT
Start: 2024-01-09 | End: 2024-01-09 | Stop reason: HOSPADM

## 2024-01-09 RX ORDER — ONDANSETRON HYDROCHLORIDE 2 MG/ML
4 INJECTION, SOLUTION INTRAVENOUS EVERY 8 HOURS PRN
Status: DISCONTINUED | OUTPATIENT
Start: 2024-01-09 | End: 2024-01-09 | Stop reason: HOSPADM

## 2024-01-09 RX ORDER — ONDANSETRON 4 MG/1
4 TABLET, FILM COATED ORAL EVERY 8 HOURS PRN
Status: DISCONTINUED | OUTPATIENT
Start: 2024-01-09 | End: 2024-01-09 | Stop reason: HOSPADM

## 2024-01-09 RX ORDER — HEPARIN SODIUM 5000 [USP'U]/ML
5000 INJECTION, SOLUTION INTRAVENOUS; SUBCUTANEOUS EVERY 8 HOURS
Status: DISCONTINUED | OUTPATIENT
Start: 2024-01-09 | End: 2024-01-09 | Stop reason: HOSPADM

## 2024-01-09 RX ORDER — PANTOPRAZOLE SODIUM 40 MG/1
40 TABLET, DELAYED RELEASE ORAL
Status: DISCONTINUED | OUTPATIENT
Start: 2024-01-09 | End: 2024-01-09 | Stop reason: HOSPADM

## 2024-01-09 RX ORDER — DOCUSATE SODIUM 100 MG/1
100 CAPSULE, LIQUID FILLED ORAL 2 TIMES DAILY
Qty: 14 CAPSULE | Refills: 0 | Status: SHIPPED | OUTPATIENT
Start: 2024-01-09 | End: 2024-01-16

## 2024-01-09 RX ORDER — GABAPENTIN 300 MG/1
300 CAPSULE ORAL EVERY 8 HOURS SCHEDULED
Status: DISCONTINUED | OUTPATIENT
Start: 2024-01-09 | End: 2024-01-09 | Stop reason: HOSPADM

## 2024-01-09 RX ORDER — PANTOPRAZOLE SODIUM 40 MG/10ML
40 INJECTION, POWDER, LYOPHILIZED, FOR SOLUTION INTRAVENOUS
Status: DISCONTINUED | OUTPATIENT
Start: 2024-01-09 | End: 2024-01-09 | Stop reason: HOSPADM

## 2024-01-09 RX ADMIN — CELECOXIB 200 MG: 200 CAPSULE ORAL at 00:46

## 2024-01-09 RX ADMIN — TRAMADOL HYDROCHLORIDE 50 MG: 50 TABLET, COATED ORAL at 03:47

## 2024-01-09 RX ADMIN — GABAPENTIN 300 MG: 300 CAPSULE ORAL at 13:43

## 2024-01-09 RX ADMIN — SULFAMETHOXAZOLE AND TRIMETHOPRIM 320 MG: 800; 160 TABLET ORAL at 10:20

## 2024-01-09 RX ADMIN — ACETAMINOPHEN 650 MG: 325 TABLET ORAL at 12:19

## 2024-01-09 RX ADMIN — ONDANSETRON HYDROCHLORIDE 4 MG: 4 TABLET, FILM COATED ORAL at 00:52

## 2024-01-09 RX ADMIN — ACETAMINOPHEN 650 MG: 325 TABLET ORAL at 00:45

## 2024-01-09 RX ADMIN — CELECOXIB 200 MG: 200 CAPSULE ORAL at 10:20

## 2024-01-09 RX ADMIN — DOCUSATE SODIUM 100 MG: 100 CAPSULE, LIQUID FILLED ORAL at 10:20

## 2024-01-09 RX ADMIN — HEPARIN SODIUM 5000 UNITS: 5000 INJECTION INTRAVENOUS; SUBCUTANEOUS at 10:20

## 2024-01-09 RX ADMIN — DOCUSATE SODIUM 100 MG: 100 CAPSULE, LIQUID FILLED ORAL at 00:46

## 2024-01-09 RX ADMIN — ACETAMINOPHEN 650 MG: 325 TABLET ORAL at 06:10

## 2024-01-09 RX ADMIN — PANTOPRAZOLE SODIUM 40 MG: 40 TABLET, DELAYED RELEASE ORAL at 06:10

## 2024-01-09 RX ADMIN — HEPARIN SODIUM 5000 UNITS: 5000 INJECTION INTRAVENOUS; SUBCUTANEOUS at 00:46

## 2024-01-09 RX ADMIN — GABAPENTIN 300 MG: 300 CAPSULE ORAL at 06:10

## 2024-01-09 RX ADMIN — GABAPENTIN 300 MG: 300 CAPSULE ORAL at 00:46

## 2024-01-09 RX ADMIN — TRAMADOL HYDROCHLORIDE 50 MG: 50 TABLET, COATED ORAL at 10:26

## 2024-01-09 SDOH — SOCIAL STABILITY: SOCIAL INSECURITY: DO YOU FEEL ANYONE HAS EXPLOITED OR TAKEN ADVANTAGE OF YOU FINANCIALLY OR OF YOUR PERSONAL PROPERTY?: NO

## 2024-01-09 SDOH — SOCIAL STABILITY: SOCIAL INSECURITY: ARE YOU OR HAVE YOU BEEN THREATENED OR ABUSED PHYSICALLY, EMOTIONALLY, OR SEXUALLY BY ANYONE?: NO

## 2024-01-09 SDOH — SOCIAL STABILITY: SOCIAL INSECURITY: HAS ANYONE EVER THREATENED TO HURT YOUR FAMILY OR YOUR PETS?: NO

## 2024-01-09 SDOH — SOCIAL STABILITY: SOCIAL INSECURITY: ARE THERE ANY APPARENT SIGNS OF INJURIES/BEHAVIORS THAT COULD BE RELATED TO ABUSE/NEGLECT?: NO

## 2024-01-09 SDOH — SOCIAL STABILITY: SOCIAL INSECURITY: WERE YOU ABLE TO COMPLETE ALL THE BEHAVIORAL HEALTH SCREENINGS?: YES

## 2024-01-09 SDOH — SOCIAL STABILITY: SOCIAL INSECURITY: DOES ANYONE TRY TO KEEP YOU FROM HAVING/CONTACTING OTHER FRIENDS OR DOING THINGS OUTSIDE YOUR HOME?: NO

## 2024-01-09 SDOH — SOCIAL STABILITY: SOCIAL INSECURITY: HAVE YOU HAD THOUGHTS OF HARMING ANYONE ELSE?: NO

## 2024-01-09 SDOH — SOCIAL STABILITY: SOCIAL INSECURITY: ABUSE: ADULT

## 2024-01-09 SDOH — SOCIAL STABILITY: SOCIAL INSECURITY: DO YOU FEEL UNSAFE GOING BACK TO THE PLACE WHERE YOU ARE LIVING?: NO

## 2024-01-09 ASSESSMENT — ACTIVITIES OF DAILY LIVING (ADL)
HEARING - RIGHT EAR: FUNCTIONAL
HEARING - LEFT EAR: FUNCTIONAL
BATHING: NEEDS ASSISTANCE
WALKS IN HOME: INDEPENDENT
DRESSING YOURSELF: NEEDS ASSISTANCE
LACK_OF_TRANSPORTATION: NO
JUDGMENT_ADEQUATE_SAFELY_COMPLETE_DAILY_ACTIVITIES: YES
GROOMING: NEEDS ASSISTANCE
LACK_OF_TRANSPORTATION: NO
TOILETING: NEEDS ASSISTANCE
FEEDING YOURSELF: INDEPENDENT
ADEQUATE_TO_COMPLETE_ADL: YES
PATIENT'S MEMORY ADEQUATE TO SAFELY COMPLETE DAILY ACTIVITIES?: YES

## 2024-01-09 ASSESSMENT — PATIENT HEALTH QUESTIONNAIRE - PHQ9
1. LITTLE INTEREST OR PLEASURE IN DOING THINGS: NOT AT ALL
2. FEELING DOWN, DEPRESSED OR HOPELESS: NOT AT ALL
SUM OF ALL RESPONSES TO PHQ9 QUESTIONS 1 & 2: 0

## 2024-01-09 ASSESSMENT — COGNITIVE AND FUNCTIONAL STATUS - GENERAL
TOILETING: TOTAL
MOBILITY SCORE: 16
MOVING FROM LYING ON BACK TO SITTING ON SIDE OF FLAT BED WITH BEDRAILS: A LOT
TURNING FROM BACK TO SIDE WHILE IN FLAT BAD: A LOT
WALKING IN HOSPITAL ROOM: A LITTLE
DRESSING REGULAR UPPER BODY CLOTHING: A LOT
CLIMB 3 TO 5 STEPS WITH RAILING: A LITTLE
HELP NEEDED FOR BATHING: A LOT
DRESSING REGULAR LOWER BODY CLOTHING: A LOT
PATIENT BASELINE BEDBOUND: NO
DAILY ACTIVITIY SCORE: 14
MOVING TO AND FROM BED TO CHAIR: A LITTLE
STANDING UP FROM CHAIR USING ARMS: A LITTLE
PERSONAL GROOMING: A LITTLE

## 2024-01-09 ASSESSMENT — LIFESTYLE VARIABLES
HOW OFTEN DO YOU HAVE 6 OR MORE DRINKS ON ONE OCCASION: NEVER
SKIP TO QUESTIONS 9-10: 1
HOW OFTEN DO YOU HAVE A DRINK CONTAINING ALCOHOL: 2-4 TIMES A MONTH
HOW MANY STANDARD DRINKS CONTAINING ALCOHOL DO YOU HAVE ON A TYPICAL DAY: 1 OR 2
AUDIT-C TOTAL SCORE: 2
AUDIT-C TOTAL SCORE: 2

## 2024-01-09 ASSESSMENT — PAIN SCALES - GENERAL
PAINLEVEL_OUTOF10: 4
PAINLEVEL_OUTOF10: 4

## 2024-01-09 ASSESSMENT — PAIN DESCRIPTION - LOCATION: LOCATION: CHEST

## 2024-01-09 NOTE — OP NOTE
Bilateral Immediate breast reconstruction with Prepectoral Tissue   Expanders and biologic mesh     Date: 2024  OR Location: [facility] OR    Name: Iris Barth, : 1980, Age: 43 y.o., MRN: [id number], Sex: female    Diagnosis  Pre-op Diagnosis     * Malignant neoplasm of overlapping sites of left breast in female, estrogen receptor positive (CMS/HCC) [C50.812, Z17.0] Post-op Diagnosis     * Malignant neoplasm of overlapping sites of left breast in female, estrogen receptor positive (CMS/HCC) [C50.812, Z17.0]     Procedures  Breast Tissue Expander Insertion bilatera  67022 - [place] TISSUE EXPANDER PLACEMENT BREAST RECONSTRUCTION    Torso Implant Therapeutic Insertion X2  06607 - [place] IMPLNT BIO IMPLNT FOR SOFT TISSUE REINFORCEMENT    Surgeons      * Ayah Edgar - Primary      Resident/Fellow/Other Assistant:  Surgeon(s) and Role:  Panel 2:     * Andreina Padgett PA-C  There was no skilled resident help available    Procedure Summary  Anesthesia: General  ASA: II  Anesthesia Staff: Anesthesiologist: Ge Lane MD; Michael Daniels MD  CRNA: CARMELITA Hoang-CRNA  C-AA: JOHN Telles; JOHN Germain  Estimated Blood Loss: 2 mL  Intra-op Medications:   Medication Name Total Dose   lidocaine (Xylocaine) 10 mg/mL (1 %) injection 20 mL   isosulfan blue (Lumphazurin) 1 % injection 5 mL   sodium chloride 0.9 % irrigation solution 1,000 mL   BUPivacaine-EPINEPHrine (Marcaine w/EPI) 40 mL in sodium chloride 0.9% 80 mL syringe 120 mL              Anesthesia Record               Intraprocedure I/O Totals          Intake    LR infusion 1700.00 mL    Total Intake 1700 mL       Output    Urine 600 mL    Est. Blood Loss 150 mL    Total Output 750 mL       Net    Net Volume 950 mL          Specimen:   ID Type Source Tests Collected by Time   1 : RIGHT BREAST - DOUBLE SHORT STITCH 12 O'CLOCK; DOUBLE LONG STITCH AXILLARY TAIL Tissue BREAST MASTECTOMY RIGHT SURGICAL PATHOLOGY EXAM Vaishnavi Rojas MD 2024 1347   2  : LEFT BREAST - DOUBLE SHORT STITCH 12 O'CLOCK; DOUBLE LONG STITCH AXILLARY TAIL Tissue BREAST MASTECTOMY LEFT SURGICAL PATHOLOGY EXAM Vaishnavi Rojas MD 1/8/2024 1530   3 : LEFT AXILLARY SENTINEL LYMPH NODE #1, PALPABLE Tissue SENTINEL LYMPH NODE BREAST LEFT SURGICAL PATHOLOGY EXAM Vaishnavi Rojas MD 1/8/2024 1624   4 : LEFT AXILLARY SENTINEL LYMPH NODE #2, HOT Tissue SENTINEL LYMPH NODE BREAST LEFT SURGICAL PATHOLOGY EXAM Vaishnavi Rojas MD 1/8/2024 1626   5 : LEFT AXILLARY SENTINEL LYMPH NODE #3, HOT Tissue SENTINEL LYMPH NODE BREAST LEFT SURGICAL PATHOLOGY EXAM Vaishnavi Rojas MD 1/8/2024 1629   6 : LEFT MASTECTOMY INCISION Tissue BREAST MASTECTOMY LEFT SURGICAL PATHOLOGY EXAM Ayah Edgar MD 1/8/2024 1842        Staff:   Circulator: Asmita HATCH RN  Relief Circulator: Lorraine Lombardo RN; Sis Eagle RN  Relief Scrub: [facility]  Scrub Person: Abel Lacey; Ros Sullivan; Alesha Poe; Gem Marie; Sis Eagle RN         Drains and/or Catheters:   Closed/Suction Drain Inferior;Lateral;Right Breast Bulb 19 Fr. (Active)       Closed/Suction Drain Lateral;Right Breast Bulb 19 Fr. (Active)       Closed/Suction Drain Inferior;Lateral;Left Breast Bulb 19 Fr. (Active)       Closed/Suction Drain Lateral;Left Breast Bulb 19 Fr. (Active)     Implants:  Implants       Type Name Action Serial No.      Graft GRAFT, ALLODERM CONTOUR LARGE, PERFORATED, MEDIUM - [id number] Implanted       GALAFLEX LITE 15CM X 20CM Implanted       BREAST TISSUE EXPANDER, SUTURE TABS, INTEGRAL INJECTION DOME 535CC Implanted [id number]     Graft GRAFT, ALLODERM CONTOUR LARGE, PERFORATED, MEDIUM - [id number] Implanted       GALAFLEX LITE 15CM X 20CM Implanted       BREAST TISSUE EXPANDER, SUTURE TABS, INTEGRAL INJECTION DOME, 535CC Implanted               Indications:  Iris Barth is an 43 y.o. y.o. female who is having surgery for Malignant neoplasm of overlapping sites of left breast in female, estrogen receptor  positive (CMS/[facility]) [C50.812, Z17.0].  She will be getting a bilateral skin sparing mastectomies and sentinel lymph node biopsies by Dr. Vaishnavi Rojas.  She will be then getting an immediate bilateral breast reconstructions with prepectoral tissue expander placement and biologic mesh by myself.     The patient was seen in the preoperative area. The risks, benefits, complications, treatment options, non-operative alternatives, expected recovery and outcomes were discussed with the patient. The possibilities of reaction to medication, pulmonary aspiration, injury to surrounding structures, bleeding, recurrent infection, the need for additional procedures, failure to diagnose a condition, and creating a complication requiring transfusion or operation were discussed with the patient. The risks of the procedure were discussed with her prior to surgery.  These include but are not limited to the risks of anesthesia, infection, bleeding, pain, need for further procedures, hematoma, seroma, wound healing complications, and asymmetry.  The patient concurred with the proposed plan, giving informed consent.  The site of surgery was properly noted/marked if necessary per policy. The patient has been actively warmed in preoperative area. Preoperative antibiotics have been ordered and given within 1 hours of incision. Venous thrombosis prophylaxis have been ordered including bilateral sequential compression devices     Procedure Details: The patient was identified and marked in the upright and standing position.  She was taken to the operative room and placed in the supine position.  A preoperative time was performed identifying the patient, procedeure and laterality.  An atraumatic endotracheal intubation was performed by the anesthesia team.  Her arms were padded and carefully secured to the arm boards, abducted less than 90 degrees. She was prepped and draped in the usual sterile fashion.      Dr. Rojas then performed a  bilateral skin sparing mastectomies and sentinel lymph node biopsies.  Please refer to a separately dictated operative note for details of her portion of the procedure.  I was called once her portion of the procedure was completed.  The specimens weighed approximately 700 g on the left and 664.8 g on the right.  The skin had good perfusion and an vertical circumareolar incision had been used.  I obtained hemostasis.  The wounds were copiously irrigated with normal saline and Irrisept.  I diluted 40 cc of 0.50% Marcaine with epinephrine with 80 cc of normal saline.  I used this to place a bilateral chest wall block including a pectoralis block intercostal blocks as well as field blocks on the chest wall sentinel lymph node site and DIANNA site.  Based on her chest wall width and mastectomy specimen weight I used 535 cc ultrahigh smooth Artouro tissue expanders.  On the back table I filled them with air and secured a piece of large perforated medium thickness contoured AlloDerm along the anterior and inferior quadrants to a 16 x 20 piece of galaflex lite along the posterior and superior borders which was trimmed to fit the implants.  This construct was on the chest wall into each pocket.  The constructs were secured to the chest wall using 3-0 Monocryl on all its tabs.  Two 19 [place] Seamus drains were placed in each side.  The mastectomy incisions were freshened using curved iris scissors.  The incision was then closed vertically in multiple layers using 3-0 Monocryl in the parenchyma as well as interrupted buried dermals.  The vertical excess of the breast ptosis was advanced completely inferiorly.  I marked a transverse crescent inferiorly to incorporate the excess inferiorly in the lower pole, positioning the superior dog ear centrally in the breast mound to provide more projection in the future nipple areolar complex regions.  I then sharply de-epithelialized this area.  On the right, it measured 15 cm x 7 cm, on the  left it was 17 cm x 8 cm.  I inbricated and closed the incisions in multiple layers with 3-0 Monocryl interrupted buried dermals and a running 4-0 Monocryl subcuticular.  The magnet was used to access the port and some air was removed from each tissue expander to decrease tension on the mastectomy skin flaps.       The incisions were dressed with LiquiBan and 1 inch Steri-Strips.  A Prevena Yolie form VAC dressing was placed.  The drains were covered with CHG Tegaderm dressings.  The patient tolerated the procedure well she was awoken extubated and taken to the recovery room in good condition    Complications:  None; patient tolerated the procedure well.    Disposition: PACU - hemodynamically stable.  Condition: stable     Attending Attestation: I performed the procedure.    Ayah Edgar MD  Phone Number: [phone]

## 2024-01-09 NOTE — PROGRESS NOTES
01/09/24 1201   Discharge Planning   Living Arrangements Spouse/significant other   Support Systems Spouse/significant other;Family members   Type of Residence Private residence   Number of Stairs to Enter Residence 2   Number of Stairs Within Residence 0   Patient expects to be discharged to: Home patient anticipate no needs   Does the patient need discharge transport arranged? Yes   RoundTrip coordination needed? Yes   Has discharge transport been arranged? Yes   Transportation Needs   In the past 12 months, has lack of transportation kept you from medical appointments or from getting medications? no   In the past 12 months, has lack of transportation kept you from meetings, work, or from getting things needed for daily living? No     Met patient and spouse at bedside, to discuss discharge planning needs, patient is observation status, d/t Malignant neoplasm of overlapping sites of left breast PLAN; bilateral skin sparing mastectomy 1/8/2024 PTA patient Independent with ADL 's no asst device, drives self/family  to appointments, last seen PCP 4 months ago, able to afford/obtain medication without difficulty.

## 2024-01-09 NOTE — DISCHARGE SUMMARY
Discharge Diagnosis  Malignant neoplasm of overlapping sites of left breast in female, estrogen receptor positive (CMS/HCC)    Issues Requiring Follow-Up  DIANNA drains in place on Discharge    Test Results Pending At Discharge  Pending Labs       Order Current Status    Surgical Pathology Exam In process    Surgical Pathology Exam In process            Hospital Course   Iris Barth is a 43 year-old female who presented with left breast masses x 2 for which core needle biopsy showed invasive ductal carcinoma, ER+/IA+/HER2-, [id number], clinical stage 1A. She strongly desired bilateral mastectomy, and a also left axillary sentinel lymph node biopsy was recommended. She is POD#1 Bilateral Skin Sparing Mastectomy with Left Axillary LN Dissection with Dr. Rojas and  immediate reconstruction with TE and Galaflex/ADM with DIANNA drains x 4 and Yolie Incisional wound vacs x 2 with Dr. Ayah Edgar.    She did well intra operatively but her post operative course was complicated by nausea since resolved. She was transferred from the PACU to the [facility] where she tolerated a regular diet, was ambulating with assistance from her  and voiding spontaneously. The next morning, DIANNA drain teaching was provided as well as wound vac care. She was ready for discharge with instructions to resume her ASA 81 mg tomorrow 1/10. For post operative pain, she will take Tylenol 500 mg every 8 hours, Neurontin 300 mg TID x 14 days as well as Celebrex 200 mg every 12 hours. A prescription for Tramadol 50 mg every 8 hours  for 5 days as needed for break through pain was also given with colace. Zofran tabs were also prescribed. She will return to the outpatient office next Tues 1/16/24 as scheduled.    Pertinent Physical Exam At Time of Discharge  /55   Pulse 89   Wt 76.2 kg (168 lb)   BMI 27.96 kg/m²       Physical Exam  Constitutional:       Appearance: Normal appearance. She is normal weight.   HENT:      Head: Normocephalic and  atraumatic.   Eyes:      Conjunctiva/sclera: Conjunctivae normal.   Cardiovascular:      Rate and Rhythm: Normal rate and regular rhythm.   Pulmonary:      Effort: Pulmonary effort is normal. No respiratory distress.      Breath sounds: Normal breath sounds.   Abdominal:      Palpations: Abdomen is soft.   Musculoskeletal:      Cervical back: Normal range of motion.      Comments: Gait normal   Skin:     General: Skin is warm and dry.   Neurological:      General: No focal deficit present.      Mental Status: She is alert and oriented to person, place, and time.   Psychiatric:         Mood and Affect: Mood normal.         Behavior: Behavior normal.         Thought Content: Thought content normal.         Judgment: Judgment normal.       Focused Exam of Breasts:    Yolie Incisional Wound Vacs in place without issue    Left DIANNA drains since insertion:  Left Breast Inferior: 60cc  Left Breast lateral:15cc    Right DIANNA drains since insertion:  Right Breast Inferior:60  Right Breast Lateral:15    Home Medications     Medication List      START taking these medications     acetaminophen 500 mg tablet; Commonly known as: Tylenol; Take 2 tablets   (1,000 mg) by mouth every 8 hours if needed for mild pain (1 - 3) for up   to 14 days.; Replaces: acetaminophen 650 mg ER tablet   celecoxib 200 mg capsule; Commonly known as: CeleBREX; Take 1 capsule   (200 mg) by mouth once daily for 14 days.   docusate sodium 100 mg capsule; Commonly known as: Colace; Take 1   capsule (100 mg) by mouth 2 times a day for 7 days.   ondansetron 4 mg tablet; Commonly known as: Zofran; Take 1 tablet (4 mg)   by mouth every 8 hours if needed for nausea for up to 4 days.   sulfamethoxazole-trimethoprim 800-160 mg tablet; Commonly known as:   Bactrim DS; Take 1 tablet by mouth 2 times a day for 14 days.   traMADol 50 mg tablet; Commonly known as: Ultram; Take 1 tablet (50 mg)   by mouth every 6 hours if needed for severe pain (7 - 10) for up to 5    days.     CHANGE how you take these medications     gabapentin 300 mg capsule; Commonly known as: Neurontin; Take 1 capsule   (300 mg) by mouth every 8 hours for 14 days.; What changed: when to take   this     CONTINUE taking these medications     aspirin 81 mg EC tablet; Take 1 tablet (81 mg) by mouth once daily. Do   not start before January 10, 2024.; Start taking on: January 10, 2024   diphenhydrAMINE 25 mg tablet; Commonly known as: Sominex   famotidine 20 mg tablet; Commonly known as: Pepcid   FIBER 6 ORAL   LORazepam 0.5 mg tablet; Commonly known as: Ativan   VITAMIN B-12 ORAL   VITAMIN D3 ORAL   Wegovy 2.4 mg/0.75 mL pen injector; Generic drug: semaglutide (weight   loss); Inject 2.4 mg under the skin every 7 days.   ZINC ACETATE ORAL     STOP taking these medications     acetaminophen 650 mg ER tablet; Commonly known as: Tylenol 8 HOUR;   Replaced by: acetaminophen 500 mg tablet   diphenhydrAMINE-acetaminophen  mg per tablet; Commonly known as:   Tylenol PM   ibuprofen 200 mg tablet       Outpatient Follow-Up  Future Appointments   Date Time Provider Department Center   1/16/2024  9:00 AM Andreina Padgett PA-C [id number] [facility]   1/19/2024  9:00 AM Iris Rangel [facility] [id number] [facility]   1/23/2024  2:00 PM Ayah Edgar MD [id number] [facility]   1/25/2024  2:30 PM Vaishnavi Rojas MD [id number] [facility]       Andreina Padgett PA-C

## 2024-01-10 ENCOUNTER — TELEPHONE (OUTPATIENT)
Dept: SURGICAL ONCOLOGY | Facility: CLINIC | Age: 44
End: 2024-01-10
Payer: COMMERCIAL

## 2024-01-10 NOTE — TELEPHONE ENCOUNTER
Patient returned my call immediately, doing well, eating, walking around house, pain is tolerable.  She says they have the drains figured out, post op appt. Is made.  All questions answered and pt. Will call if she has any more questions.

## 2024-01-10 NOTE — ANESTHESIA POSTPROCEDURE EVALUATION
Patient: Iris Barth    Procedure Summary       Date: 01/08/24 Room / Location: [facility] / [facility]    Anesthesia Start: 1300 Anesthesia Stop: 2028    Procedures:       Bilateral Skin Sparing Mastectomy (Bilateral)      Left Axillary [place] Lymph Node Biopsy (Left)      Breast Tissue Expander Insertion (Bilateral)      Torso Implant Therapeutic Insertion X2 (Bilateral) Diagnosis:       Malignant neoplasm of overlapping sites of left breast in female, estrogen receptor positive (CMS/HCC)      (Malignant neoplasm of overlapping sites of left breast in female, estrogen receptor positive (CMS/HCC) [C50.812, Z17.0])    Surgeons: Vaishnavi Rojas MD; Ayah Edgar MD Responsible Provider: SALOMON Hoang    Anesthesia Type: general ASA Status: 2            Anesthesia Type: general    Vitals Value Taken Time   /65 01/08/24 2316   Temp 36 °C (96.8 °F) 01/08/24 2024   Pulse 92 01/08/24 2324   Resp 20 01/08/24 2200   SpO2 100 % 01/08/24 2324   Vitals shown include unvalidated device data.    Anesthesia Post Evaluation    Patient location during evaluation: PACU  Patient participation: complete - patient participated  Level of consciousness: awake and alert  Pain management: adequate  Airway patency: patent  Cardiovascular status: acceptable and hemodynamically stable  Respiratory status: acceptable, spontaneous ventilation and nonlabored ventilation  Hydration status: acceptable  Postoperative Nausea and Vomiting: none        No notable events documented.

## 2024-01-11 NOTE — PROGRESS NOTES
PLASTIC SURGERY CLINIC VISIT  POSTOP BREAST RECONSTRUCTION     Date: 01/11/24   Date of Surgery: 01/08/24  Surgical Procedure: Bilateral skin sparing mastectomies with placement of tissue expanders.         HPI:   Iris Barth 43 y.o. female is here for post-operative appointment for the above procedure. She has been avoiding strenuous activity and pain is well controlled on Tylenol, Celebrex and Gabapentin. She finished her course of Tramadol and does not need additional tabs. Her wound vac is intact without alarm and her DIANNA drains are patent as her  has been stripping them and output as been low. +BM. Denies F/C/N/V, CP or SOB.      Interval changes as of this date: First post-op visit. No c/o pain.      MEDICATIONS    Current Outpatient Medications:     acetaminophen (Tylenol) 500 mg tablet, Take 2 tablets (1,000 mg) by mouth every 8 hours if needed for mild pain (1 - 3) for up to 14 days., Disp: 30 tablet, Rfl: 0    aspirin 81 mg EC tablet, Take 1 tablet (81 mg) by mouth once daily. Do not start before January 10, 2024., Disp: 30 tablet, Rfl: 0    bran/gum/fib/marcel/psyl/kelp/pec (FIBER 6 ORAL), Take by mouth 2 times a day as needed., Disp: , Rfl:     celecoxib (CeleBREX) 200 mg capsule, Take 1 capsule (200 mg) by mouth once daily for 14 days., Disp: 14 capsule, Rfl: 0    cholecalciferol, vitamin D3, (VITAMIN D3 ORAL), Take by mouth once daily., Disp: , Rfl:     cyanocobalamin, vitamin B-12, (VITAMIN B-12 ORAL), Take by mouth once daily., Disp: , Rfl:     diphenhydrAMINE (Sominex) 25 mg tablet, Take 1 tablet (25 mg) by mouth if needed for sleep., Disp: , Rfl:     docusate sodium (Colace) 100 mg capsule, Take 1 capsule (100 mg) by mouth 2 times a day for 7 days., Disp: 14 capsule, Rfl: 0    famotidine (Pepcid) 20 mg tablet, Take by mouth 2 times a day as needed for heartburn., Disp: , Rfl:     gabapentin (Neurontin) 300 mg capsule, Take 1 capsule (300 mg) by mouth every 8 hours for 14 days.,  Disp: 42 capsule, Rfl: 0    LORazepam (Ativan) 0.5 mg tablet, Take 1 tablet (0.5 mg) by mouth every 8 hours if needed for anxiety., Disp: , Rfl:     ondansetron (Zofran) 4 mg tablet, Take 1 tablet (4 mg) by mouth every 8 hours if needed for nausea for up to 4 days., Disp: 12 tablet, Rfl: 0    semaglutide, weight loss, (Wegovy) 2.4 mg/0.75 mL pen injector, Inject 2.4 mg under the skin every 7 days., Disp: 9 mL, Rfl: 3    sulfamethoxazole-trimethoprim (Bactrim DS) 800-160 mg tablet, Take 1 tablet by mouth 2 times a day for 14 days., Disp: 28 tablet, Rfl: 0    traMADol (Ultram) 50 mg tablet, Take 1 tablet (50 mg) by mouth every 6 hours if needed for severe pain (7 - 10) for up to 5 days., Disp: 15 tablet, Rfl: 0    ZINC ACETATE ORAL, Use in the mouth or throat if needed., Disp: , Rfl:       OBJECTIVE [x]Expand by Default  There were no vitals taken for this visit.     REVIEW OF SYSTEMS:    Constitutional: negative for fevers, chills, unintentional weight loss  HEENT: negative for changes in vision, headaches, changes in hearing, congestion, sore throat  Cardiovascular: negative for chest pain, palpitations  Respiratory: negative for cough, shortness of breath  Gastrointestinal: negative for nausea, vomiting, diarrhea  Genitourinary: negative for dysuria, hematuria  Musculoskeletal: negative for joint swelling or pain  Skin: negative for rashes or lesions  Psych: negative for depression, anxiety  Endocrine: negative for polyuria, polydipsia, cold/heat intolerance  Hem/Lymph: negative for bleeding disorder     PHYSICAL EXAM  General: alert and oriented, no apparent distress    Focused exam of the breasts:  Right: Hematoma that is stable. Incisions c/d/I. Photos in media and taken by Cristiano.  Left: No hematoma. Incisions c/d/i     ASSESSMENT/PLAN  Iris Barth 43 y.o. female who had Bilateral skin sparing mastectomies with placement of tissue expanders on 01/08/24 who presents for POV.     Wound vac in place. DIANNA  drain(s) in place. No erythema or edema surrounding the drain site. There is serosanguinous output from the drain. Patient recorded output of the drains showed 2 consecutive days of less than 30cc output at time of removal. Patient was educated on purpose of surgical drains and informed of risk for seroma post drain removal.       Wound vac removed.  DIANNA drain(s) removed at this visit: Right drain #1 and left drain #4. Bacitracin applied to drain site and remaining drains dressed with CHG dressing.      RTC in 1 week(s), continue to avoid strenuous activity   Keep an eye on the right breast hematoma and drain output. Call if output increases or bright red drainage.  Okay to wean off gabapentin first, then celebrex. Keep taking tylenol Q8 hours.

## 2024-01-12 ENCOUNTER — TELEPHONE (OUTPATIENT)
Dept: PLASTIC SURGERY | Facility: CLINIC | Age: 44
End: 2024-01-12
Payer: COMMERCIAL

## 2024-01-12 DIAGNOSIS — C50.812 MALIGNANT NEOPLASM OF OVERLAPPING SITES OF LEFT BREAST IN FEMALE, ESTROGEN RECEPTOR POSITIVE (MULTI): ICD-10-CM

## 2024-01-12 DIAGNOSIS — Z17.0 MALIGNANT NEOPLASM OF OVERLAPPING SITES OF LEFT BREAST IN FEMALE, ESTROGEN RECEPTOR POSITIVE (MULTI): ICD-10-CM

## 2024-01-15 ENCOUNTER — APPOINTMENT (OUTPATIENT)
Dept: PLASTIC SURGERY | Facility: CLINIC | Age: 44
End: 2024-01-15
Payer: COMMERCIAL

## 2024-01-16 ENCOUNTER — OFFICE VISIT (OUTPATIENT)
Dept: PLASTIC SURGERY | Facility: CLINIC | Age: 44
End: 2024-01-16
Payer: COMMERCIAL

## 2024-01-16 VITALS
SYSTOLIC BLOOD PRESSURE: 123 MMHG | HEART RATE: 98 BPM | WEIGHT: 166 LBS | DIASTOLIC BLOOD PRESSURE: 83 MMHG | BODY MASS INDEX: 26.79 KG/M2

## 2024-01-16 DIAGNOSIS — C50.812 MALIGNANT NEOPLASM OF OVERLAPPING SITES OF LEFT BREAST IN FEMALE, ESTROGEN RECEPTOR POSITIVE (MULTI): Primary | ICD-10-CM

## 2024-01-16 DIAGNOSIS — Z17.0 MALIGNANT NEOPLASM OF OVERLAPPING SITES OF LEFT BREAST IN FEMALE, ESTROGEN RECEPTOR POSITIVE (MULTI): Primary | ICD-10-CM

## 2024-01-16 PROCEDURE — 99024 POSTOP FOLLOW-UP VISIT: CPT | Performed by: PHYSICIAN ASSISTANT

## 2024-01-16 PROCEDURE — 1036F TOBACCO NON-USER: CPT | Performed by: PHYSICIAN ASSISTANT

## 2024-01-16 PROCEDURE — 3008F BODY MASS INDEX DOCD: CPT | Performed by: PHYSICIAN ASSISTANT

## 2024-01-18 ENCOUNTER — TUMOR BOARD CONFERENCE (OUTPATIENT)
Dept: HEMATOLOGY/ONCOLOGY | Facility: HOSPITAL | Age: 44
End: 2024-01-18
Payer: COMMERCIAL

## 2024-01-18 LAB
LAB AP ASR DISCLAIMER: NORMAL
LAB AP ASR DISCLAIMER: NORMAL
LABORATORY COMMENT REPORT: NORMAL
LABORATORY COMMENT REPORT: NORMAL
PATH REPORT.FINAL DX SPEC: NORMAL
PATH REPORT.FINAL DX SPEC: NORMAL
PATH REPORT.GROSS SPEC: NORMAL
PATH REPORT.GROSS SPEC: NORMAL
PATH REPORT.RELEVANT HX SPEC: NORMAL
PATH REPORT.RELEVANT HX SPEC: NORMAL
PATH REPORT.TOTAL CANCER: NORMAL
PATH REPORT.TOTAL CANCER: NORMAL
PATHOLOGY SYNOPTIC REPORT: NORMAL
PATHOLOGY SYNOPTIC REPORT: NORMAL

## 2024-01-18 NOTE — TUMOR BOARD NOTE
MULTIDISCIPLINARY BREAST CANCER TUMOR BOARD CONFERENCE NOTE  Iris Barth was presented at Breast Cancer Tumor Board Conference  Conference date: 1/18/2024  Presenting Provider(s): Dr. Vaishnavi Rojas  Present at Conference: Medical Oncology, Radiation Oncology, Surgical Oncology, Radiology, and Pathology Representatives  Conference Review Type: Pathology Review    Tumor Board Stage: pT1c(m)N0 M0  Breast Cancer Stage IA    National Guidelines discussed: Yes    Systemic therapy: Yes, describe: Oncotype DX, consider adjuvant chemotherapy pending results. Adjuvant endocrine therapy.  Radiation therapy: No, PMRT not recommended.  Surgical Resection: Yes,s/p bilateral mastectomy with left axillary SLNB with bilateral TE placement. Surgery is complete.  Genomic Testing: yes, Oncotype DX  Clinical Trial Eligible: no  Genetics: referral placed, consultation pending    Recommendations: Referral to Medical Oncology Oncotype DX, pending results consideration adjuvant chemotherapy. Adjuvant endocrine therapy. Genetics.    Referral Recommendations:  Genetics and Medical Oncology    Cancer Staging:  Cancer Staging   Malignant neoplasm of overlapping sites of left breast in female, estrogen receptor positive (CMS/HCC)  Staging form: Breast, AJCC 8th Edition  - Clinical stage from 11/21/2023: Stage IA (cT1a, cN0, cM0, G3, ER+, CO+, HER2: Equivocal) - Signed by Vaishnavi Rojas MD on 11/22/2023     Disclaimer  SCC tumor board recommendations represent the consensus opinion of physicians present at a weekly patient care conference. The treating [facility] physician is not always present, and many of the physicians formulating the recommendation have not personally seen or examined the patient under discussion. It is understood that the treating [facility] physician considers the expertise of the Tumor Board Recommendation in formulating his/her plan for the patient. However, in many situations, based on individualized patient considerations, a  different plan is determined by the treating physician to be the optimal medical management.

## 2024-01-19 ENCOUNTER — TELEMEDICINE CLINICAL SUPPORT (OUTPATIENT)
Dept: GENETICS | Facility: HOSPITAL | Age: 44
End: 2024-01-19
Payer: COMMERCIAL

## 2024-01-19 DIAGNOSIS — Z80.42 FAMILY HISTORY OF PROSTATE CANCER: ICD-10-CM

## 2024-01-19 DIAGNOSIS — C50.812 MALIGNANT NEOPLASM OF OVERLAPPING SITES OF LEFT BREAST IN FEMALE, ESTROGEN RECEPTOR POSITIVE (MULTI): ICD-10-CM

## 2024-01-19 DIAGNOSIS — Z71.83 ENCOUNTER FOR NONPROCREATIVE GENETIC COUNSELING AND TESTING: ICD-10-CM

## 2024-01-19 DIAGNOSIS — N63.20 MASS OF LEFT BREAST, UNSPECIFIED QUADRANT: ICD-10-CM

## 2024-01-19 DIAGNOSIS — Z80.3 FAMILY HISTORY OF BREAST CANCER: ICD-10-CM

## 2024-01-19 DIAGNOSIS — Z13.71 ENCOUNTER FOR NONPROCREATIVE GENETIC COUNSELING AND TESTING: ICD-10-CM

## 2024-01-19 DIAGNOSIS — Z17.0 MALIGNANT NEOPLASM OF OVERLAPPING SITES OF LEFT BREAST IN FEMALE, ESTROGEN RECEPTOR POSITIVE (MULTI): ICD-10-CM

## 2024-01-19 PROCEDURE — 96040 PR MEDICAL GENETICS COUNSELING EACH 30 MINUTES: CPT | Performed by: GENETIC COUNSELOR, MS

## 2024-01-19 NOTE — PROGRESS NOTES
"History of Present Illness:  Iris Barth is a 43 y.o. female with newly diagnosed breast cancer. Ms. Barth was referred to the Cancer Genetics Clinic at [facility] by Dr. Rojas.  Ms. Barth is interested in genetic testing to clarify her personal risk for cancer, as well as the risks to her family members.    Cancer Medical History:  Personal history of cancer? Yes   Type: multicentric left breast cancer  Age at diagnosis: 43  Summary: newly diagnosed multicentric left breast invasive ductal carcinoma. ER+ (80-95%) DE+ (80-95%), HER2 pending, clinical [id number], clinical stage IA.  She is status post bilateral skin sparing mastectomies with placement of tissue expanders on 24.     History of other cancers: No     Prior genetic testing? No     Cancer screening history:  Mammograms? Abnormal mammogram on 23. Additional breast imaging and biopsy in 2023 was diagnostic of multicentric left breast cancer, as above.   PAP smear?  Most recent in 2020, negative  Colonoscopy? Yes, x 1, over 10 years ago, due to \"stomach issues\"   Patient denies personal history of colorectal polyps.    Upper endoscopy? No  Dermatology? Yes, a long time ago.   Other cancer screening? No    Reproductive History:  Number of children: 3  Number of pregnancies: 3  Age first birth: 25  Breast feeding? Yes   Menarche (age): 12  Menopause (age): n/a  premenopausal  OCP: Yes , 12 years  HRT: No     Hysterectomy? No   Oophorectomy? No     Family history:  A 4-generation pedigree was obtained and was significant for the following:   - Maternal uncle (69), prostate cancer at age 67, in remission;  - MGM (d. 93), breast CA at age 54, contralateral breast CA at age 73;  - MGF (d.72), blood or bone cancer, unknown primary origin, dx'd at age 72 and  shortly after;   - Maternal great aunts x 2 (both were MGM's sisters), polycythemia vera at unknown ages;  - PGM (d.93), benign fatty tumors removed from " stomach;    Maternal ancestry is Hebrew.  Paternal ancestry is English and Scandinavian. There is no known Ashkenazi Catholic ancestry or consanguinity.     Genetic Counseling:    Ms. Barth is a 43 y.o. female with a recent diagnosis of breast cancer, s/p bilateral mastectomies.  Her family history is significant for maternal grandmother with initial breast cancer at age 54 and contralateral breast cancer at age 73, as well as a maternal uncle with prostate cancer. Based on her diagnosis of breast cancer at age 43 and family history of breast and prostate cancer, Ms. Barth meets NCCN criteria for testing of breast cancer susceptibility genes, including BRCA1 and BRCA2. Genetic testing is considered medically necessary, as it will help guide Ms. Barth's medical management, such as the need for risk-reducing BSO, and future cancer screening. Our discussion is summarized below.    We reviewed genes and the concept of hereditary cancer. We discussed that most cancers are not due to an inherited genetic susceptibility. However, in about 5-10% of cases, there is an inherited genetic mutation that can make a person more susceptible to developing certain types of cancer. Within families with a genetic predisposition to cancer, we often see certain patterns, such as multiple family members with cancer and cancers occurring in multiple generations. In addition, earlier onset and/or bilateral cancers are suggestive of an inherited predisposition. There also tends to be a clustering of certain types of cancer in these families, such as breast cancer and ovarian cancer.    For example, we discussed the BRCA1 and BRCA2 genes, which are two genes that have been linked to hereditary breast and ovarian cancer. For women with a genetic alteration, or mutation, in one of these genes, the lifetime risk for breast cancer is approximately 60-80%, which is significantly elevated compared to the 10-12% lifetime breast cancer risk for the  general population. In addition, female BRCA1/2 mutation carriers have up to a 45% lifetime risk for ovarian cancer, which is elevated over the 1-2% general population risk. Mutation carriers who have already been diagnosed with breast cancer have an increased risk to develop a second primary breast cancer. Mutations in BRCA1 and BRCA2 have also been associated with an increased risk for male breast cancer, prostate cancer, and pancreatic cancer.    Gene mutations in BRCA1 and BRCA2 are inherited in an autosomal dominant fashion. This means that if an individual has a mutation in either of these genes, their siblings and children have a 50% chance of also having the same gene mutation and a 50% chance of not having the mutation.     We discussed that there are multiple genes associated with increased breast cancer risk. Some genes, like the BRCA1 and BRCA2 genes, are considered highly penetrant breast cancer genes, meaning a mutation in the gene confers a high risk of breast cancer. There are also genes that are considered moderate risk breast cancer genes. For some moderate risk genes, there may be limited information regarding the degree to which a mutation in the gene affects risk of different types of cancers. Additionally, for some moderate risk genes, the appropriate management for individuals with a mutation is not always clear. Our knowledge about the cancer risks associated with mutations in these moderate risk genes is always growing, and we will likely be able to provide more comprehensive information in the future.     Ms. Barth was counseled about hereditary cancer susceptibility including cancer risks, options for increased screening and/or risk reduction, genetic testing, and the implications for other family members. We discussed performing genetic testing in the context of a multi-gene panel test that looks at genes associated with increased risk for breast cancer, prostate cancer, and other cancer  types. We specifically discussed the [employer] CancerNext panel, which examines the following 36 genes:  APC, JOSELYN, AXIN2, BARD1, BRCA1, BRCA2, BRIP1, BMPR1A, CDH1, CDK4, CDKN2A, CHEK2, DICER1, EPCAM, GREM1, HOXB13, MLH1, MSH2, MSH3, MSH6, MUTYH, NBN, NF1, NTHL1, PALB2, PMS2, POLD1, POLE, PTEN, RAD51C, RAD51D, RECQL, SMAD4, SMARCA4, STK11, TP53. Ms. Barth is interested in this approach.     After a discussion about the risks, benefits, and limitations of genetic testing, Ms. Barth elected to undergo genetic testing for hereditary cancer using the [employer] panel described above.  Results are typically expected in about 3-4 weeks.     Ms. Barth will return for a follow-up virtual visit in about one month to review her test results. At that time, we will discuss the implications for both Ms. Barth and her family members.        Plan:    1. Ms. Barth elected to undergo genetic testing via the 36-gene CancerNext +[employer] panel through [facility]. Verbal consent for testing was obtained. An [facility] DNA/RNA blood test kit will be sent to the patient's home. She will then bring the test kit to a  outpatient blood draw lab to have her blood drawn for testing (using the test tubes provided in the kit). The sample will then be sent to [email or web address] for analysis. Results are typically available in 3-4 weeks.    2. Ms. Barth will return for a follow up visit in about one month to discuss her test results. A follow up visit has been scheduled for 2/19/24 @ 8:30am, virtual.     3. We remain available to Ms. Barth at [phone] if any questions arise regarding information discussed at today's visit.      Sammie Rangel, MGC, [facility]  Licensed Genetic Counselor  [facility]  Ph: [phone]    Reviewed by:  Chrissy Terry MD  Clinical   [facility]    Time spent with patient: 41 minutes  (9:06 - 9:47 am), virtual

## 2024-01-19 NOTE — Clinical Note
Please schedule patient for a cancer genetics follow up visit with me on 2/19/24 at 8:30am, virtual.  She is already aware of the apt date and time.

## 2024-01-22 NOTE — PROGRESS NOTES
PLASTIC SURGERY CLINIC VISIT  POSTOP BREAST RECONSTRUCTION     Date: 01/22/24   Date of Surgery: 01/08/24  Surgical Procedure: Bilateral skin sparing mastectomies with placement of tissue expanders.         HPI:   Iris Barth 43 y.o. female is here for post-operative appointment for the above procedure. She has been avoiding strenuous activity, no pain. She finished her course of Tramadol and does not need additional tabs. DIANNA drains are ready to be removed per protocol <30cc over 24 hours.     Interval changes as of this date: Healing well overall, right breast hematoma has skin necrosis which was expected from last visit. Remaining DIANNA drains ready to be removed.     MEDICATIONS    Current Outpatient Medications:     acetaminophen (Tylenol) 500 mg tablet, Take 2 tablets (1,000 mg) by mouth every 8 hours if needed for mild pain (1 - 3) for up to 14 days., Disp: 30 tablet, Rfl: 0    aspirin 81 mg EC tablet, Take 1 tablet (81 mg) by mouth once daily. Do not start before January 10, 2024., Disp: 30 tablet, Rfl: 0    bran/gum/fib/marcel/psyl/kelp/pec (FIBER 6 ORAL), Take by mouth 2 times a day as needed., Disp: , Rfl:     celecoxib (CeleBREX) 200 mg capsule, Take 1 capsule (200 mg) by mouth once daily for 14 days., Disp: 14 capsule, Rfl: 0    cholecalciferol, vitamin D3, (VITAMIN D3 ORAL), Take by mouth once daily., Disp: , Rfl:     cyanocobalamin, vitamin B-12, (VITAMIN B-12 ORAL), Take by mouth once daily., Disp: , Rfl:     diphenhydrAMINE (Sominex) 25 mg tablet, Take 1 tablet (25 mg) by mouth if needed for sleep., Disp: , Rfl:     famotidine (Pepcid) 20 mg tablet, Take by mouth 2 times a day as needed for heartburn., Disp: , Rfl:     gabapentin (Neurontin) 300 mg capsule, Take 1 capsule (300 mg) by mouth every 8 hours for 14 days., Disp: 42 capsule, Rfl: 0    LORazepam (Ativan) 0.5 mg tablet, Take 1 tablet (0.5 mg) by mouth every 8 hours if needed for anxiety., Disp: , Rfl:     semaglutide, weight loss,  (Wegovy) 2.4 mg/0.75 mL pen injector, Inject 2.4 mg under the skin every 7 days., Disp: 9 mL, Rfl: 3    sulfamethoxazole-trimethoprim (Bactrim DS) 800-160 mg tablet, Take 1 tablet by mouth 2 times a day for 14 days., Disp: 28 tablet, Rfl: 0    ZINC ACETATE ORAL, Use in the mouth or throat if needed., Disp: , Rfl:       OBJECTIVE [x]Expand by Default  Blood pressure 109/78, pulse 86, weight 75.3 kg (166 lb).      REVIEW OF SYSTEMS:    Constitutional: negative for fevers, chills, unintentional weight loss  HEENT: negative for changes in vision, headaches, changes in hearing, congestion, sore throat  Cardiovascular: negative for chest pain, palpitations  Respiratory: negative for cough, shortness of breath  Gastrointestinal: negative for nausea, vomiting, diarrhea  Genitourinary: negative for dysuria, hematuria  Musculoskeletal: negative for joint swelling or pain  Skin: negative for rashes or lesions  Psych: negative for depression, anxiety  Endocrine: negative for polyuria, polydipsia, cold/heat intolerance  Hem/Lymph: negative for bleeding disorder     PHYSICAL EXAM  General: alert and oriented, no apparent distress    Focused exam of the breasts:  Right: resolving hematoma inferior medial quadrant and partial to deep thickness MSN.  Left: No hematoma. Incisions healing, small area of duskiness at triple point       Photos taken by Cristiano.  ASSESSMENT/PLAN  Iris Barth 43 y.o. female who had Bilateral skin sparing mastectomies with placement of tissue expanders on 01/08/24 who presents for POV.     DIANNA drain(s) in place. No erythema or edema surrounding the drain site. There is serosanguinous output from the drain. Patient recorded output of the drains showed 2 consecutive days of less than 30cc output at time of removal. Patient was educated on purpose of surgical drains and informed of risk for seroma post drain removal.       DIANNA drain(s) removed at this visit: Right and left drain. Bacitracin applied to  drain site with bandaid.      No expansion today.  New steristrips placed.  Allow to demarcate and assess healing in 1 week  Continue activity restrictions and nutrition optimization.    May require debridement and closure.     Attending Attestation:  Ayah BOOTH MD, personal performed the history, exam, and decision making on this patient.  A total of 20 mins were spent in patient counseling, review of medical records, and coordination of care.

## 2024-01-23 ENCOUNTER — LAB (OUTPATIENT)
Dept: LAB | Facility: CLINIC | Age: 44
End: 2024-01-23
Payer: COMMERCIAL

## 2024-01-23 ENCOUNTER — APPOINTMENT (OUTPATIENT)
Dept: LAB | Facility: LAB | Age: 44
End: 2024-01-23
Payer: COMMERCIAL

## 2024-01-23 ENCOUNTER — OFFICE VISIT (OUTPATIENT)
Dept: PLASTIC SURGERY | Facility: CLINIC | Age: 44
End: 2024-01-23
Payer: COMMERCIAL

## 2024-01-23 VITALS
BODY MASS INDEX: 26.79 KG/M2 | WEIGHT: 166 LBS | DIASTOLIC BLOOD PRESSURE: 78 MMHG | SYSTOLIC BLOOD PRESSURE: 109 MMHG | HEART RATE: 86 BPM

## 2024-01-23 DIAGNOSIS — Z80.3 FAMILY HISTORY OF BREAST CANCER: ICD-10-CM

## 2024-01-23 DIAGNOSIS — Z17.0 MALIGNANT NEOPLASM OF OVERLAPPING SITES OF LEFT BREAST IN FEMALE, ESTROGEN RECEPTOR POSITIVE (MULTI): ICD-10-CM

## 2024-01-23 DIAGNOSIS — G89.18 POST-OP PAIN: ICD-10-CM

## 2024-01-23 DIAGNOSIS — Z80.42 FAMILY HISTORY OF PROSTATE CANCER: ICD-10-CM

## 2024-01-23 DIAGNOSIS — C50.812 MALIGNANT NEOPLASM OF OVERLAPPING SITES OF LEFT BREAST IN FEMALE, ESTROGEN RECEPTOR POSITIVE (MULTI): ICD-10-CM

## 2024-01-23 PROCEDURE — 99024 POSTOP FOLLOW-UP VISIT: CPT | Performed by: SURGERY

## 2024-01-23 PROCEDURE — 1036F TOBACCO NON-USER: CPT | Performed by: SURGERY

## 2024-01-23 PROCEDURE — 3008F BODY MASS INDEX DOCD: CPT | Performed by: SURGERY

## 2024-01-23 PROCEDURE — 36415 COLL VENOUS BLD VENIPUNCTURE: CPT

## 2024-01-24 RX ORDER — GABAPENTIN 300 MG/1
300 CAPSULE ORAL 2 TIMES DAILY
Qty: 28 CAPSULE | Refills: 0 | Status: SHIPPED | OUTPATIENT
Start: 2024-01-24 | End: 2024-02-08 | Stop reason: HOSPADM

## 2024-01-25 ENCOUNTER — OFFICE VISIT (OUTPATIENT)
Dept: SURGICAL ONCOLOGY | Facility: CLINIC | Age: 44
End: 2024-01-25
Payer: COMMERCIAL

## 2024-01-25 VITALS
BODY MASS INDEX: 26.4 KG/M2 | SYSTOLIC BLOOD PRESSURE: 115 MMHG | TEMPERATURE: 98.4 F | DIASTOLIC BLOOD PRESSURE: 82 MMHG | HEART RATE: 85 BPM | RESPIRATION RATE: 18 BRPM | OXYGEN SATURATION: 100 % | WEIGHT: 163.58 LBS

## 2024-01-25 DIAGNOSIS — Z98.890 S/P BREAST RECONSTRUCTION: ICD-10-CM

## 2024-01-25 DIAGNOSIS — Z80.3 FAMILY HISTORY OF BREAST CANCER: ICD-10-CM

## 2024-01-25 DIAGNOSIS — C50.812 MALIGNANT NEOPLASM OF OVERLAPPING SITES OF LEFT BREAST IN FEMALE, ESTROGEN RECEPTOR POSITIVE (MULTI): Primary | ICD-10-CM

## 2024-01-25 DIAGNOSIS — Z90.13 STATUS POST BILATERAL MASTECTOMY: ICD-10-CM

## 2024-01-25 DIAGNOSIS — Z17.0 MALIGNANT NEOPLASM OF OVERLAPPING SITES OF LEFT BREAST IN FEMALE, ESTROGEN RECEPTOR POSITIVE (MULTI): Primary | ICD-10-CM

## 2024-01-25 PROCEDURE — 1036F TOBACCO NON-USER: CPT | Performed by: SURGERY

## 2024-01-25 PROCEDURE — 3008F BODY MASS INDEX DOCD: CPT | Performed by: SURGERY

## 2024-01-25 PROCEDURE — 99024 POSTOP FOLLOW-UP VISIT: CPT | Performed by: SURGERY

## 2024-01-25 NOTE — PROGRESS NOTES
Subjective     HPI  Iris Barth is a very pleasant 43 y.o.  female s/p bilateral skin sparing mastectomy, left axillary SLNB on 2024 for multicentric left breast invasive ductal carcinoma, ER+80-95%, MS+ 80-95%, HER2 negative, [id number], clinical stage IA. Final pathology showed right breast benign tissue; left breast with 2 foci of IDC (1.1 cm, 1.0 cm), grade 2, and 1.5 cm DCIS, margins negative, 0/5 lymph nodes, [id number], stage IA.     BREAST IMAGIN/3/2023 Bilateral breast ultrasound, indicates BI-RADS Category 4. Left breast suspicious masses, bilateral indeterminate masses, and indeterminate left axillary lymph nodes. Further ultrasound examination with elastography of bilateral breasts as well as ultrasound guided core needle biopsy are recommended.     On 11/10/23, left breast core biopsy at 1:00, 7 cm FN showed invasive ductal carcinoma, grade 2, ER + (>95%), MS + (>95%) HER2 2+, dual DELMY pending. At  11:00, 7 CM FN, core biopsy showed invasive ductal carcinoma, grade 2-3 ER + (80%), MS + (80%), HER2 +, dual DELMY pending. Right breast core needle biopsy showed fibroadenoma.     She completed genetic testing which was negative for deleterious mutations.      She presents today for postoperative visit. She is recovering well from surgery. She is seeing Dr. Edgar in plastic surgery and may initiated hyperbaric oxygen therapy for superficial necrosis of the right mastectomy flap.      REPRODUCTIVE HISTORY: menarche age 12, , first birth age 25,  x 1 year, OCP's x 12 years, premenopausal, LMP no periods on continual birth control, scattered fibroglandular tissue                                FAMILY CANCER HISTORY:   Maternal Grandmother: Breast cancer, 54, recurrence at 70. .   Maternal Grandfather: Bone cancer age 71.      No past medical history on file.     Past Medical History:   Diagnosis Date    Abnormal finding on breast imaging 2023    Acid reflux      pepcis prn    Acute frontal sinusitis 10/30/2023    Acute viral syndrome 10/30/2023    Anxiety     Lorazepam as needed    Breast CA (CMS/HCC)     Constipation     COVID-19 12/09/2023    was given Paxlovid, fully recovered    DVT (deep vein thrombosis) in pregnancy     proved after left knee scope,  left calf , SQ heparin briefly with no further issues, requests post op Heparin for this procedure    Heart murmur     asymptomatic     Current Outpatient Medications on File Prior to Visit   Medication Sig Dispense Refill    cholecalciferol (Vitamin D-3) 25 MCG (1000 UT) tablet Take 1 tablet (25 mcg) by mouth once daily.      cyanocobalamin, vitamin B-12, (VITAMIN B-12 ORAL) Take 1,000 mcg by mouth once daily.      diphenhydrAMINE (Sominex) 25 mg tablet Take 1 tablet (25 mg) by mouth if needed for sleep.      semaglutide, weight loss, (Wegovy) 2.4 mg/0.75 mL pen injector Inject 2.4 mg under the skin every 7 days. 9 mL 3    zinc acetate 50 mg (zinc) capsule Take 1 tablet by mouth once daily.       No current facility-administered medications on file prior to visit.       Review of Systems    Objective   Physical Exam  General: Otherwise healthy appearing. No acute distress.     HEENT: Conjunctiva well-colored, sclera non-icteric. Neck supple, trachea midline. No lymphadenopathy or thyromegaly.     Cardiovascular: Regular rate and rhythm.    Respiratory: Clear to auscultation bilaterally.     Breast: Bilateral inverted T mastectomy incisions, Steri-strips intact, expanders intact. Right breast with hematoma and possible early superficial flap necrosis at medial inferior aspect.     Abdomen: Soft, non-tender, non-distended.    Extremities: Atraumatic, no edema.     Neurologic: Motor and sensory grossly intact. Alert and oriented.     Lymphatic: No axillary, supraclavicular, or infraclavicular lymphadenopathy bilaterally.     Assessment/Plan   Her care was discussed at multidisciplinary breast cancer conference.  Recommendations include referral to medical oncology. Oncotype sent. Radiation not recommended.     You are recovering well from surgery.     Instructions:   1. Continue following Dr. Edgar's postoperative instructions regarding wound care, activity, and medications.    We discussed your pathology report. This showed in the left breast 2 areas of invasive ductal cancer, ER+/ID+/ HER2- , clear margins and no cancer in the lymph nodes. The left breast was benign.     I explained the recommendations for referral to medical oncology and endocrine therapy.     All questions were answered in detail, and we are in agreement with the following plan:   1. Referral to medical oncology.   2. Please return in 6 months for clinical exam with one of our CNPs in breast cancer survivorship.     If you need assistance with scheduling medical oncology appointment, please call [phone] option #2. If you have any questions, concerns, or changes in your breast self-exam prior to our next visit, please call my office at the number below. Our breast care team looks forward to seeing you again soon.     Vaishnavi Rojas MD   Breast Surgical Oncology   Department of Surgery [facility]   Office: [phone] (option #2)   Fax: [phone]     **DISCLAIMER** Speech recognition software was used to create portions of this document. While an attempt at proofreading has been made, minor errors in transcription may be present.    Diagnoses and all orders for this visit:  Malignant neoplasm of overlapping sites of left breast in female, estrogen receptor positive (CMS/HCC)  -     Referral to Hematology and Oncology; Future  Status post bilateral mastectomy  S/P breast reconstruction  Family history of breast cancer    Scribe Attestation  By signing my name below, Kaylah BOOTH Scribe, attest that this documentation has been prepared under the direction and in the presence of Vaishnavi Rojas MD.

## 2024-01-26 ENCOUNTER — HOSPITAL ENCOUNTER (OUTPATIENT)
Dept: RADIOLOGY | Facility: CLINIC | Age: 44
Discharge: HOME | End: 2024-01-26
Payer: COMMERCIAL

## 2024-01-26 ENCOUNTER — OFFICE VISIT (OUTPATIENT)
Dept: WOUND CARE | Facility: CLINIC | Age: 44
End: 2024-01-26
Payer: COMMERCIAL

## 2024-01-26 ENCOUNTER — HOSPITAL ENCOUNTER (OUTPATIENT)
Dept: CARDIOLOGY | Facility: CLINIC | Age: 44
Discharge: HOME | End: 2024-01-26
Payer: COMMERCIAL

## 2024-01-26 ENCOUNTER — LAB (OUTPATIENT)
Dept: LAB | Facility: LAB | Age: 44
End: 2024-01-26
Payer: COMMERCIAL

## 2024-01-26 DIAGNOSIS — C50.812 MALIGNANT NEOPLASM OF OVERLAPPING SITES OF LEFT FEMALE BREAST (MULTI): ICD-10-CM

## 2024-01-26 DIAGNOSIS — C50.812 MALIGNANT NEOPLASM OF OVERLAPPING SITES OF LEFT FEMALE BREAST (MULTI): Primary | ICD-10-CM

## 2024-01-26 PROCEDURE — 82040 ASSAY OF SERUM ALBUMIN: CPT

## 2024-01-26 PROCEDURE — 99213 OFFICE O/P EST LOW 20 MIN: CPT | Mod: 25

## 2024-01-26 PROCEDURE — 93010 ELECTROCARDIOGRAM REPORT: CPT | Performed by: STUDENT IN AN ORGANIZED HEALTH CARE EDUCATION/TRAINING PROGRAM

## 2024-01-26 PROCEDURE — 93005 ELECTROCARDIOGRAM TRACING: CPT

## 2024-01-26 PROCEDURE — 71046 X-RAY EXAM CHEST 2 VIEWS: CPT

## 2024-01-26 PROCEDURE — 36415 COLL VENOUS BLD VENIPUNCTURE: CPT

## 2024-01-27 LAB — ALBUMIN SERPL BCP-MCNC: 4.2 G/DL (ref 3.4–5)

## 2024-01-28 ENCOUNTER — TELEPHONE (OUTPATIENT)
Dept: PLASTIC SURGERY | Facility: HOSPITAL | Age: 44
End: 2024-01-28
Payer: COMMERCIAL

## 2024-01-28 LAB
ATRIAL RATE: 88 BPM
P AXIS: 72 DEGREES
P OFFSET: 188 MS
P ONSET: 132 MS
PR INTERVAL: 176 MS
Q ONSET: 220 MS
QRS COUNT: 14 BEATS
QRS DURATION: 74 MS
QT INTERVAL: 332 MS
QTC CALCULATION(BAZETT): 401 MS
QTC FREDERICIA: 377 MS
R AXIS: 38 DEGREES
T AXIS: 80 DEGREES
T OFFSET: 386 MS
VENTRICULAR RATE: 88 BPM

## 2024-01-28 NOTE — TELEPHONE ENCOUNTER
"Iris is s/p bilateral skin sparing mastectomy and tissue expander placement on 1/8 c/b R breast hematoma. Patient was evaluated in clinic on 1/16, hematoma stable, wound vac removed. Re-evaluated on 1/23, DIANNA drains removed. Calls today because she is experiencing increased yellow-blood tinged drainage from her R breast incision. Also experiencing R sided \"rib cage\" pain below her armpit that is stabbing, intermittent in nature, worse with movement and alleviated with pressure. Denies fevers, chest pain, SOB, dizziness, lightheadedness, pain to her breasts, or increased firmness or edema to her breasts. Continues to take Tylenol and Gabapentin.     Plan:  - Multimodal pain regimen with Tylenol and 600mg Ibuprofen q8h   - Maintain steri strips on breast incision  - Can use gauze/pads for additional wound care in the interim  - Call back or go to ED if experiencing chest pain, SOB  - Follow up in clinic tomorrow 1/29 Minoff @1130    Patient and plan discussed with Dr. Tala Lilly PA-C  Plastic and Reconstructive Surgery  Epic chat, Pager [phone], Team phones: [phone]    "

## 2024-01-28 NOTE — PROGRESS NOTES
PLASTIC SURGERY CLINIC VISIT  POSTOP BREAST RECONSTRUCTION     Date: 01/28/24   Date of Surgery: 01/08/24  Surgical Procedure: Bilateral skin sparing mastectomies with placement of tissue expanders.         HPI:   Iris Barth 43 y.o. female is here for post-operative appointment for the above procedure.      Interval changes as of this date: ***     MEDICATIONS    Current Outpatient Medications:     aspirin 81 mg EC tablet, Take 1 tablet (81 mg) by mouth once daily. Do not start before January 10, 2024., Disp: 30 tablet, Rfl: 0    bran/gum/fib/marcel/psyl/kelp/pec (FIBER 6 ORAL), Take by mouth 2 times a day as needed., Disp: , Rfl:     cholecalciferol, vitamin D3, (VITAMIN D3 ORAL), Take by mouth once daily., Disp: , Rfl:     cyanocobalamin, vitamin B-12, (VITAMIN B-12 ORAL), Take by mouth once daily., Disp: , Rfl:     diphenhydrAMINE (Sominex) 25 mg tablet, Take 1 tablet (25 mg) by mouth if needed for sleep., Disp: , Rfl:     gabapentin (Neurontin) 300 mg capsule, Take 1 capsule (300 mg) by mouth 2 times a day for 14 days., Disp: 28 capsule, Rfl: 0    semaglutide, weight loss, (Wegovy) 2.4 mg/0.75 mL pen injector, Inject 2.4 mg under the skin every 7 days., Disp: 9 mL, Rfl: 3    ZINC ACETATE ORAL, Use in the mouth or throat if needed., Disp: , Rfl:       OBJECTIVE [x]Expand by Default  There were no vitals taken for this visit.      REVIEW OF SYSTEMS:    Constitutional: negative for fevers, chills, unintentional weight loss  HEENT: negative for changes in vision, headaches, changes in hearing, congestion, sore throat  Cardiovascular: negative for chest pain, palpitations  Respiratory: negative for cough, shortness of breath  Gastrointestinal: negative for nausea, vomiting, diarrhea  Genitourinary: negative for dysuria, hematuria  Musculoskeletal: negative for joint swelling or pain  Skin: negative for rashes or lesions  Psych: negative for depression, anxiety  Endocrine: negative for polyuria, polydipsia,  cold/heat intolerance  Hem/Lymph: negative for bleeding disorder     PHYSICAL EXAM  General: alert and oriented, no apparent distress    Focused exam of the breasts:  Right: resolving hematoma inferior medial quadrant and partial to deep thickness MSN.  Left: No hematoma. Incisions healing, small area of duskiness at triple point       Photos taken by Cristiano.  ASSESSMENT/PLAN  Iris Barth 43 y.o. female who had Bilateral skin sparing mastectomies with placement of tissue expanders on 01/08/24 who presents for POV.            RTC in ***

## 2024-01-29 ENCOUNTER — APPOINTMENT (OUTPATIENT)
Dept: WOUND CARE | Facility: CLINIC | Age: 44
End: 2024-01-29
Payer: COMMERCIAL

## 2024-01-29 ENCOUNTER — OFFICE VISIT (OUTPATIENT)
Dept: PLASTIC SURGERY | Facility: CLINIC | Age: 44
End: 2024-01-29
Payer: COMMERCIAL

## 2024-01-29 VITALS
BODY MASS INDEX: 27.08 KG/M2 | HEART RATE: 88 BPM | RESPIRATION RATE: 16 BRPM | DIASTOLIC BLOOD PRESSURE: 83 MMHG | WEIGHT: 168.5 LBS | SYSTOLIC BLOOD PRESSURE: 122 MMHG | HEIGHT: 66 IN

## 2024-01-29 DIAGNOSIS — Z90.10 ACUTE PAIN AFTER MASTECTOMY: Primary | ICD-10-CM

## 2024-01-29 DIAGNOSIS — G89.18 ACUTE PAIN AFTER MASTECTOMY: Primary | ICD-10-CM

## 2024-01-29 PROCEDURE — 99213 OFFICE O/P EST LOW 20 MIN: CPT | Performed by: SURGERY

## 2024-01-29 PROCEDURE — 3008F BODY MASS INDEX DOCD: CPT | Performed by: SURGERY

## 2024-01-29 PROCEDURE — 1036F TOBACCO NON-USER: CPT | Performed by: SURGERY

## 2024-01-29 RX ORDER — PANTOPRAZOLE SODIUM 40 MG/1
40 TABLET, DELAYED RELEASE ORAL
Status: ON HOLD | COMMUNITY
End: 2024-02-06 | Stop reason: WASHOUT

## 2024-01-29 RX ORDER — BISMUTH SUBSALICYLATE 262 MG
1 TABLET,CHEWABLE ORAL DAILY
Status: ON HOLD | COMMUNITY
End: 2024-02-06 | Stop reason: WASHOUT

## 2024-01-29 ASSESSMENT — PAIN SCALES - GENERAL: PAINLEVEL: 6

## 2024-01-29 NOTE — PROGRESS NOTES
"     PLASTIC SURGERY CLINIC VISIT  POSTOP BREAST RECONSTRUCTION     Date: 01/29/24   Date of Surgery: 01/08/24  Surgical Procedure: Bilateral skin sparing mastectomies with placement of tissue expanders.         HPI:   Iris Barth 43 y.o. female is here for post-operative appointment for the above procedure due to changes to the right breast.     Interval changes as of this date: Right breast increase in oozing, sharp pain on the right breast that radiates under the axilla.     MEDICATIONS    Current Outpatient Medications:     bran/gum/fib/marcel/psyl/kelp/pec (FIBER 6 ORAL), Take by mouth 2 times a day as needed., Disp: , Rfl:     cholecalciferol, vitamin D3, (VITAMIN D3 ORAL), Take by mouth once daily., Disp: , Rfl:     cyanocobalamin, vitamin B-12, (VITAMIN B-12 ORAL), Take by mouth once daily., Disp: , Rfl:     diphenhydrAMINE (Sominex) 25 mg tablet, Take 1 tablet (25 mg) by mouth if needed for sleep., Disp: , Rfl:     gabapentin (Neurontin) 300 mg capsule, Take 1 capsule (300 mg) by mouth 2 times a day for 14 days., Disp: 28 capsule, Rfl: 0    multivitamin tablet, Take 1 tablet by mouth once daily., Disp: , Rfl:     pantoprazole (ProtoNix) 40 mg EC tablet, Take 1 tablet (40 mg) by mouth once daily in the morning. Take before meals. prn, Disp: , Rfl:     semaglutide, weight loss, (Wegovy) 2.4 mg/0.75 mL pen injector, Inject 2.4 mg under the skin every 7 days., Disp: 9 mL, Rfl: 3    ZINC ACETATE ORAL, Use in the mouth or throat if needed., Disp: , Rfl:     aspirin 81 mg EC tablet, Take 1 tablet (81 mg) by mouth once daily. Do not start before January 10, 2024. (Patient not taking: Reported on 1/29/2024), Disp: 30 tablet, Rfl: 0      OBJECTIVE [x]Expand by Default  Blood pressure 122/83, pulse 88, resp. rate 16, height 1.676 m (5' 6\"), weight 76.4 kg (168 lb 8 oz).      REVIEW OF SYSTEMS:    Constitutional: negative for fevers, chills, unintentional weight loss  HEENT: negative for changes in vision, " headaches, changes in hearing, congestion, sore throat  Cardiovascular: negative for chest pain, palpitations  Respiratory: negative for cough, shortness of breath  Gastrointestinal: negative for nausea, vomiting, diarrhea  Genitourinary: negative for dysuria, hematuria  Musculoskeletal: negative for joint swelling or pain  Skin: negative for rashes or lesions  Psych: negative for depression, anxiety  Endocrine: negative for polyuria, polydipsia, cold/heat intolerance  Hem/Lymph: negative for bleeding disorder     PHYSICAL EXAM  General: alert and oriented, no apparent distress    Focused exam of the breasts:  Right: resolving hematoma inferior medial quadrant and partial to deep thickness MSN.   Left: No hematoma. Incisions healing, small area of duskiness at triple point.       Photos taken by Cristiano.  ASSESSMENT/PLAN  Irissam Barth 43 y.o. female who had Bilateral skin sparing mastectomies with placement of tissue expanders on 01/08/24 who presents for POV.     1. New steristrips placed.  Allow to demarcate and assess healing in 1 week (already scheduled)  2. Hyperbaric Oxygen Therapy referral, pre procedure requirements complete and pending insurance approval  3. Continue activity restrictions and nutrition optimization.    4. May require debridement and closure if TE becomes exposed    It was a pleasure to see you today. We look forward to your next visit but please call sooner in the event a large gush of fluid is noted from Right breast and/or any concerns we can help you with.    RTC in 1 week(s)

## 2024-01-30 ENCOUNTER — APPOINTMENT (OUTPATIENT)
Dept: PLASTIC SURGERY | Facility: CLINIC | Age: 44
End: 2024-01-30
Payer: COMMERCIAL

## 2024-02-01 ENCOUNTER — OFFICE VISIT (OUTPATIENT)
Dept: CARDIOLOGY | Facility: CLINIC | Age: 44
End: 2024-02-01
Payer: COMMERCIAL

## 2024-02-01 VITALS
OXYGEN SATURATION: 100 % | BODY MASS INDEX: 27 KG/M2 | DIASTOLIC BLOOD PRESSURE: 83 MMHG | HEIGHT: 66 IN | SYSTOLIC BLOOD PRESSURE: 128 MMHG | WEIGHT: 168 LBS | HEART RATE: 90 BPM

## 2024-02-01 DIAGNOSIS — Z01.818 PRE-PROCEDURAL EXAMINATION: Primary | ICD-10-CM

## 2024-02-01 DIAGNOSIS — C50.812 MALIGNANT NEOPLASM OF OVERLAPPING SITES OF LEFT BREAST IN FEMALE, ESTROGEN RECEPTOR POSITIVE (MULTI): ICD-10-CM

## 2024-02-01 DIAGNOSIS — Z17.0 MALIGNANT NEOPLASM OF OVERLAPPING SITES OF LEFT BREAST IN FEMALE, ESTROGEN RECEPTOR POSITIVE (MULTI): ICD-10-CM

## 2024-02-01 DIAGNOSIS — R94.31 ABNORMAL EKG: ICD-10-CM

## 2024-02-01 PROBLEM — J01.10 ACUTE FRONTAL SINUSITIS: Status: RESOLVED | Noted: 2023-10-30 | Resolved: 2024-02-01

## 2024-02-01 PROBLEM — R92.8 ABNORMAL FINDING ON BREAST IMAGING: Status: RESOLVED | Noted: 2023-11-07 | Resolved: 2024-02-01

## 2024-02-01 PROBLEM — B34.9 ACUTE VIRAL SYNDROME: Status: RESOLVED | Noted: 2023-10-30 | Resolved: 2024-02-01

## 2024-02-01 PROCEDURE — 99214 OFFICE O/P EST MOD 30 MIN: CPT | Performed by: STUDENT IN AN ORGANIZED HEALTH CARE EDUCATION/TRAINING PROGRAM

## 2024-02-01 PROCEDURE — 99204 OFFICE O/P NEW MOD 45 MIN: CPT | Performed by: STUDENT IN AN ORGANIZED HEALTH CARE EDUCATION/TRAINING PROGRAM

## 2024-02-01 PROCEDURE — 1036F TOBACCO NON-USER: CPT | Performed by: STUDENT IN AN ORGANIZED HEALTH CARE EDUCATION/TRAINING PROGRAM

## 2024-02-01 PROCEDURE — 3008F BODY MASS INDEX DOCD: CPT | Performed by: STUDENT IN AN ORGANIZED HEALTH CARE EDUCATION/TRAINING PROGRAM

## 2024-02-01 RX ORDER — ACETAMINOPHEN 500 MG
TABLET ORAL EVERY 6 HOURS PRN
COMMUNITY
End: 2024-02-08 | Stop reason: HOSPADM

## 2024-02-01 RX ORDER — LORAZEPAM 0.5 MG/1
1 TABLET ORAL 3 TIMES DAILY PRN
COMMUNITY

## 2024-02-01 ASSESSMENT — ENCOUNTER SYMPTOMS
GASTROINTESTINAL NEGATIVE: 1
NEUROLOGICAL NEGATIVE: 1
PND: 0
MUSCULOSKELETAL NEGATIVE: 1
RESPIRATORY NEGATIVE: 1
EYES NEGATIVE: 1
DYSPNEA ON EXERTION: 0
HEMATOLOGIC/LYMPHATIC NEGATIVE: 1
CONSTITUTIONAL NEGATIVE: 1
SYNCOPE: 0
ALLERGIC/IMMUNOLOGIC NEGATIVE: 1
PSYCHIATRIC NEGATIVE: 1
ORTHOPNEA: 0
ENDOCRINE NEGATIVE: 1
PALPITATIONS: 0
NEAR-SYNCOPE: 0

## 2024-02-01 NOTE — PATIENT INSTRUCTIONS
Ok from a heart standpoint to proceed with hyperbaric oxygen therapy.     We will hold off on a heart ultrasound for now give your recent surgery; if you have any shortness of breath / rapid heart rate / heart palpitations we would then consider a heart ultrasound (echocardiogram).    Please call my nurse Annia with any questions; her contact information is below.     Thank you for your visit today. Please contact our office (via [employer] or phone) with any additional questions.     [facility]    Annia, RN/Clinic Nurse for:    Dr. Aubrey Fallon    [address], [address]  [place] [address]    Phone: [phone] Press Option 5 then Option 3 to speak with the Clinic Nurse (Annia)    _____    To Reach:    Billing Questions -    [phone]  Scheduling / Rescheduling -  Option 1  Refills / Medication Requests -  Option 3  General Office / [place] -  Option 4  Results -     Option 6  Medical Records -    Option 7  Repeat Options -    Option 9

## 2024-02-01 NOTE — PROGRESS NOTES
Cardiology New Patient History and Physical    Reason for referral: abnormal ECG; pre-procedural evaluation (hyperbaric therapy)     HPI: Iris Barth is a 43 y.o.  female who presents today for abnormal ECG. Past medical history of breast cancer (invasive ductal carcinoma; ER+/MN+/HER2-; [id number]; clinical stage 1A; s/p bilateral mastectomy), Hx COVID 19 (12/2023), remote hx DVT (provoked) after knee scope.      Patient recently underwent needle core biopsy for left breast mass; found ot have invasive ductal carcinoma.  S/p bilateral mastectomy (1/8/2024) with tissue expanders.  Post op patient had right skin hematoma.   Patient was evaluated in plastic surgery clinic on 1/16, hematoma stable, wound vac removed. Re-evaluated on 1/23, DIANNA drains removed.     Patient evaluated 1/29/2024 > Interval changes as of this date: Right breast increase in oozing, sharp pain on the right breast that radiates under the axilla.  Plan per plastic surgery team is for hyperbaric oxygen therapy for flap salvage.  Patient was referred to cardiology clinic pre-procedurally given recent ECG finding.     Iris presented to cardiology clinic on 2/1/2024.  Recent ECG (1/26/2024) showed sinus rhythm; possible left atrial enlargement, and low-voltage QRS.  Patient is currently asymptomatic from a cardiovascular perspective.  Patient denies any dyspnea, orthopnea, PND, syncope, presyncope, palpitations, fever/chills, diaphoresis, or pedal edema.  Patient has a remote history of provoked DVT in the setting of knee arthroscopy.  Patient had SARS 2 COVID in December 2023; no residual symptoms. Per patient remote hx of possible MV prolapse.      Past Medical History:   She has a past medical history of Acid reflux, Anxiety, Breast CA (CMS/HCC), Constipation, COVID-19 (12/09/2023), DVT (deep vein thrombosis) in pregnancy, and Heart murmur.    Surgical History:   She has a past surgical history that includes Knee surgery  (08/31/2015); BI US guided breast localization and biopsy left (Left, 11/10/2023); BI stereotactic guided breast right localization and biopsy (Right, 11/10/2023); and Belt abdominoplasty.    Family History:   Family History   Problem Relation Name Age of Onset    No Known Problems Mother      Blood clot Father      Skin cancer Father      No Known Problems Sister      No Known Problems Sister      Breast cancer Maternal Grandmother      Breast cancer Paternal Grandmother      Cancer Other grandparent        Allergies:  Patient has no known allergies.     Social History:   - Non-smoker; no illicit drug use    Prior Cardiovascular Testing (personally reviewed):     ECG (1/26/2024)- normal sinus rhythm; possible left atrial enlargement; low voltage QRS    Review of Systems:  Review of Systems   Constitutional: Negative.   HENT: Negative.     Eyes: Negative.    Cardiovascular:  Negative for dyspnea on exertion, near-syncope, orthopnea, palpitations, paroxysmal nocturnal dyspnea and syncope.        Expected chest wall pains post-op bilateral mastectomy    Respiratory: Negative.     Endocrine: Negative.    Hematologic/Lymphatic: Negative.    Skin: Negative.    Musculoskeletal: Negative.    Gastrointestinal: Negative.    Genitourinary: Negative.    Neurological: Negative.    Psychiatric/Behavioral: Negative.     Allergic/Immunologic: Negative.        Objective     Outpatient Medications:    Current Outpatient Medications:     acetaminophen (Tylenol) 500 mg tablet, Take by mouth every 6 hours if needed for mild pain (1 - 3)., Disp: , Rfl:     bran/gum/fib/marcel/psyl/kelp/pec (FIBER 6 ORAL), Take by mouth 2 times a day as needed., Disp: , Rfl:     cholecalciferol, vitamin D3, (VITAMIN D3 ORAL), Take by mouth once daily., Disp: , Rfl:     cyanocobalamin, vitamin B-12, (VITAMIN B-12 ORAL), Take by mouth once daily., Disp: , Rfl:     gabapentin (Neurontin) 300 mg capsule, Take 1 capsule (300 mg) by mouth 2 times a day for 14  "days., Disp: 28 capsule, Rfl: 0    IBUPROFEN ORAL, Take by mouth., Disp: , Rfl:     LORAZEPAM ORAL, Take by mouth., Disp: , Rfl:     multivitamin tablet, Take 1 tablet by mouth once daily., Disp: , Rfl:     semaglutide, weight loss, (Wegovy) 2.4 mg/0.75 mL pen injector, Inject 2.4 mg under the skin every 7 days., Disp: 9 mL, Rfl: 3    ZINC ACETATE ORAL, Use in the mouth or throat if needed., Disp: , Rfl:     aspirin 81 mg EC tablet, Take 1 tablet (81 mg) by mouth once daily. Do not start before January 10, 2024. (Patient not taking: Reported on 1/29/2024), Disp: 30 tablet, Rfl: 0    diphenhydrAMINE (Sominex) 25 mg tablet, Take 1 tablet (25 mg) by mouth if needed for sleep., Disp: , Rfl:     pantoprazole (ProtoNix) 40 mg EC tablet, Take 1 tablet (40 mg) by mouth once daily in the morning. Take before meals. prn, Disp: , Rfl:      Last Recorded Vitals  /83 (BP Location: Right arm, Patient Position: Sitting, BP Cuff Size: Adult)   Pulse 90   Ht 1.676 m (5' 6\")   Wt 76.2 kg (168 lb)   SpO2 100%   BMI 27.12 kg/m²     Physical Exam:  Physical Exam  Constitutional:       General: She is not in acute distress.  HENT:      Head: Normocephalic.      Mouth/Throat:      Mouth: Mucous membranes are moist.   Eyes:      Extraocular Movements: Extraocular movements intact.      Conjunctiva/sclera: Conjunctivae normal.   Neck:      Vascular: No carotid bruit or JVD.   Cardiovascular:      Rate and Rhythm: Normal rate and regular rhythm.      Pulses: Normal pulses.      Heart sounds: No murmur heard.     No friction rub.   Pulmonary:      Effort: Pulmonary effort is normal. No respiratory distress.      Breath sounds: Normal breath sounds.   Abdominal:      General: There is no distension.   Musculoskeletal:         General: No swelling.   Skin:     General: Skin is warm and dry.   Neurological:      General: No focal deficit present.      Mental Status: She is alert.      Cranial Nerves: No cranial nerve deficit.      " "Motor: No weakness.   Psychiatric:         Mood and Affect: Mood normal.         Behavior: Behavior normal.         Lab Review:    Lab Results   Component Value Date    GLUCOSE 78 01/04/2024    CALCIUM 9.6 01/04/2024     (L) 01/04/2024    K 4.0 01/04/2024    CO2 21 01/04/2024     01/04/2024    BUN 10 01/04/2024    CREATININE 0.77 01/04/2024       Lab Results   Component Value Date    WBC 6.4 01/04/2024    HGB 13.3 01/04/2024    HCT 39.4 01/04/2024    MCV 91 01/04/2024     01/04/2024       Lab Results   Component Value Date    CHOL 216 (H) 02/06/2023     Lab Results   Component Value Date    HDL 88.8 02/06/2023     No results found for: \"LDLCALC\"  Lab Results   Component Value Date    TRIG 107 02/06/2023       Lab Results   Component Value Date    TSH 2.04 02/06/2023       Assessment:   43 y.o.  female who presents today for abnormal ECG. Past medical history of breast cancer (invasive ductal carcinoma; ER+/FL+/HER2-; [id number]; clinical stage 1A; s/p bilateral mastectomy), Hx COVID 19 (12/2023), remote hx DVT (provoked) after knee scope.      Iris presented to cardiology clinic on 2/1/2024.  Recent ECG (1/26/2024) showed sinus rhythm; possible left atrial enlargement, and low-voltage QRS.  Patient is currently asymptomatic from a cardiovascular perspective.  Patient denies any dyspnea, orthopnea, PND, syncope, presyncope, palpitations, fever/chills, diaphoresis, or pedal edema.  Patient has a remote history of provoked DVT in the setting of knee arthroscopy.  Patient had SARS 2 COVID in December 2023; no residual symptoms.    Recommend proceeding with hyperbaric oxygen therapy without additional cardiovascular testing at this time.  Discussed the option of transthoracic echocardiogram for further evaluation given history of remote mitral valve prolapse, possible left atrial enlargement seen on ECG.  Low voltage on EKG likely in the setting of tissue expanders.  As patient is recent " "postoperative from bilateral mastectomy will defer transthoracic echocardiography for now given limited arm range of motion / patient comfort. We can re-visit option of TTE in future if patient wishes to pursue.  Per patient no plans for chemotherapy or radiation; we can defer onco-echo at this time.     Overall Plan:  1.  Preprocedural evaluation prior to hyperbaric oxygen therapy  - Okay from a cardiac perspective to proceed with hyperbaric therapy    2.  Abnormal EKG  - Low voltage likely secondary to tissue expanders in place  - Discussed option of transthoracic echocardiogram for further evaluation; patient would like to defer for now she is asymptomatic    3.  Invasive ductal carcinoma status post bilateral mastectomy  - Follow-up as per oncology and plastic surgery    4. Discussed \"red flag\" symptoms that should prompt immediate medical attention; patient verbalized understanding    Disposition: Return to cardiology clinic as needed    Alvaro Burgos MD        "

## 2024-02-03 LAB — SCAN RESULT: NORMAL

## 2024-02-05 ENCOUNTER — APPOINTMENT (OUTPATIENT)
Dept: WOUND CARE | Facility: CLINIC | Age: 44
End: 2024-02-05
Payer: COMMERCIAL

## 2024-02-06 ENCOUNTER — HOSPITAL ENCOUNTER (OUTPATIENT)
Facility: HOSPITAL | Age: 44
Setting detail: SURGERY ADMIT
End: 2024-02-06
Attending: SURGERY | Admitting: SURGERY
Payer: COMMERCIAL

## 2024-02-06 ENCOUNTER — OFFICE VISIT (OUTPATIENT)
Dept: PLASTIC SURGERY | Facility: CLINIC | Age: 44
End: 2024-02-06
Payer: COMMERCIAL

## 2024-02-06 ENCOUNTER — HOSPITAL ENCOUNTER (INPATIENT)
Facility: HOSPITAL | Age: 44
LOS: 2 days | Discharge: HOME | DRG: 857 | End: 2024-02-08
Attending: SURGERY
Payer: COMMERCIAL

## 2024-02-06 VITALS
HEART RATE: 92 BPM | SYSTOLIC BLOOD PRESSURE: 117 MMHG | BODY MASS INDEX: 27 KG/M2 | DIASTOLIC BLOOD PRESSURE: 83 MMHG | HEIGHT: 66 IN | WEIGHT: 168 LBS

## 2024-02-06 DIAGNOSIS — N61.0 INFECTION OF RIGHT BREAST: Primary | ICD-10-CM

## 2024-02-06 DIAGNOSIS — Z17.0 MALIGNANT NEOPLASM OF OVERLAPPING SITES OF LEFT BREAST IN FEMALE, ESTROGEN RECEPTOR POSITIVE (MULTI): Primary | ICD-10-CM

## 2024-02-06 DIAGNOSIS — N61.0 INFECTION OF RIGHT BREAST: ICD-10-CM

## 2024-02-06 DIAGNOSIS — G89.18 POST-OP PAIN: ICD-10-CM

## 2024-02-06 DIAGNOSIS — C50.812 MALIGNANT NEOPLASM OF OVERLAPPING SITES OF LEFT BREAST IN FEMALE, ESTROGEN RECEPTOR POSITIVE (MULTI): Primary | ICD-10-CM

## 2024-02-06 DIAGNOSIS — T81.49XA SURGICAL SITE INFECTION: Primary | ICD-10-CM

## 2024-02-06 LAB
ALBUMIN SERPL BCP-MCNC: 4.7 G/DL (ref 3.4–5)
ANION GAP SERPL CALC-SCNC: 15 MMOL/L (ref 10–20)
BUN SERPL-MCNC: 19 MG/DL (ref 6–23)
CALCIUM SERPL-MCNC: 10 MG/DL (ref 8.6–10.6)
CHLORIDE SERPL-SCNC: 104 MMOL/L (ref 98–107)
CO2 SERPL-SCNC: 23 MMOL/L (ref 21–32)
CREAT SERPL-MCNC: 0.78 MG/DL (ref 0.5–1.05)
EGFRCR SERPLBLD CKD-EPI 2021: >90 ML/MIN/1.73M*2
ERYTHROCYTE [DISTWIDTH] IN BLOOD BY AUTOMATED COUNT: 13.4 % (ref 11.5–14.5)
GLUCOSE SERPL-MCNC: 63 MG/DL (ref 74–99)
HCG UR QL IA.RAPID: NEGATIVE
HCT VFR BLD AUTO: 38.7 % (ref 36–46)
HGB BLD-MCNC: 13.3 G/DL (ref 12–16)
MAGNESIUM SERPL-MCNC: 1.99 MG/DL (ref 1.6–2.4)
MCH RBC QN AUTO: 31.7 PG (ref 26–34)
MCHC RBC AUTO-ENTMCNC: 34.4 G/DL (ref 32–36)
MCV RBC AUTO: 92 FL (ref 80–100)
NRBC BLD-RTO: 0 /100 WBCS (ref 0–0)
PHOSPHATE SERPL-MCNC: 3 MG/DL (ref 2.5–4.9)
PLATELET # BLD AUTO: 375 X10*3/UL (ref 150–450)
POTASSIUM SERPL-SCNC: 3.4 MMOL/L (ref 3.5–5.3)
RBC # BLD AUTO: 4.2 X10*6/UL (ref 4–5.2)
SODIUM SERPL-SCNC: 139 MMOL/L (ref 136–145)
WBC # BLD AUTO: 10.4 X10*3/UL (ref 4.4–11.3)

## 2024-02-06 PROCEDURE — 1036F TOBACCO NON-USER: CPT | Performed by: SURGERY

## 2024-02-06 PROCEDURE — 84520 ASSAY OF UREA NITROGEN: CPT

## 2024-02-06 PROCEDURE — 83735 ASSAY OF MAGNESIUM: CPT

## 2024-02-06 PROCEDURE — 1170000001 HC PRIVATE ONCOLOGY ROOM DAILY

## 2024-02-06 PROCEDURE — 85027 COMPLETE CBC AUTOMATED: CPT

## 2024-02-06 PROCEDURE — 81025 URINE PREGNANCY TEST: CPT | Performed by: NURSE PRACTITIONER

## 2024-02-06 PROCEDURE — 2500000001 HC RX 250 WO HCPCS SELF ADMINISTERED DRUGS (ALT 637 FOR MEDICARE OP)

## 2024-02-06 PROCEDURE — 2500000004 HC RX 250 GENERAL PHARMACY W/ HCPCS (ALT 636 FOR OP/ED)

## 2024-02-06 PROCEDURE — 3008F BODY MASS INDEX DOCD: CPT | Performed by: SURGERY

## 2024-02-06 PROCEDURE — 99222 1ST HOSP IP/OBS MODERATE 55: CPT

## 2024-02-06 PROCEDURE — 99024 POSTOP FOLLOW-UP VISIT: CPT | Performed by: SURGERY

## 2024-02-06 PROCEDURE — 2500000004 HC RX 250 GENERAL PHARMACY W/ HCPCS (ALT 636 FOR OP/ED): Performed by: NURSE PRACTITIONER

## 2024-02-06 PROCEDURE — 36415 COLL VENOUS BLD VENIPUNCTURE: CPT

## 2024-02-06 RX ORDER — FLUTICASONE PROPIONATE 50 MCG
1 SPRAY, SUSPENSION (ML) NASAL DAILY PRN
COMMUNITY

## 2024-02-06 RX ORDER — CEFAZOLIN SODIUM 2 G/100ML
2 INJECTION, SOLUTION INTRAVENOUS EVERY 8 HOURS
Status: DISCONTINUED | OUTPATIENT
Start: 2024-02-06 | End: 2024-02-08 | Stop reason: HOSPADM

## 2024-02-06 RX ORDER — ACETAMINOPHEN 500 MG
5 TABLET ORAL NIGHTLY PRN
Status: DISCONTINUED | OUTPATIENT
Start: 2024-02-06 | End: 2024-02-06

## 2024-02-06 RX ORDER — GABAPENTIN 300 MG/1
300 CAPSULE ORAL 2 TIMES DAILY
Status: DISCONTINUED | OUTPATIENT
Start: 2024-02-06 | End: 2024-02-08 | Stop reason: HOSPADM

## 2024-02-06 RX ORDER — ACETAMINOPHEN 325 MG/1
975 TABLET ORAL EVERY 8 HOURS
Status: DISCONTINUED | OUTPATIENT
Start: 2024-02-06 | End: 2024-02-08 | Stop reason: HOSPADM

## 2024-02-06 RX ORDER — PANTOPRAZOLE SODIUM 40 MG/1
40 TABLET, DELAYED RELEASE ORAL
Status: DISCONTINUED | OUTPATIENT
Start: 2024-02-07 | End: 2024-02-08 | Stop reason: HOSPADM

## 2024-02-06 RX ORDER — DOCUSATE SODIUM 100 MG/1
100 CAPSULE, LIQUID FILLED ORAL 2 TIMES DAILY
Status: DISCONTINUED | OUTPATIENT
Start: 2024-02-06 | End: 2024-02-08 | Stop reason: HOSPADM

## 2024-02-06 RX ORDER — LORAZEPAM 0.5 MG/1
0.5 TABLET ORAL ONCE
Status: COMPLETED | OUTPATIENT
Start: 2024-02-06 | End: 2024-02-06

## 2024-02-06 RX ORDER — POTASSIUM CHLORIDE 20 MEQ/1
40 TABLET, EXTENDED RELEASE ORAL ONCE
Status: COMPLETED | OUTPATIENT
Start: 2024-02-06 | End: 2024-02-06

## 2024-02-06 RX ORDER — CETIRIZINE HYDROCHLORIDE 10 MG/1
10 TABLET ORAL DAILY PRN
COMMUNITY

## 2024-02-06 RX ORDER — DIPHENHYDRAMINE HCL 25 MG
25 CAPSULE ORAL NIGHTLY PRN
Status: DISCONTINUED | OUTPATIENT
Start: 2024-02-06 | End: 2024-02-08 | Stop reason: HOSPADM

## 2024-02-06 RX ADMIN — ACETAMINOPHEN 975 MG: 325 TABLET ORAL at 18:13

## 2024-02-06 RX ADMIN — GABAPENTIN 300 MG: 300 CAPSULE ORAL at 20:32

## 2024-02-06 RX ADMIN — DOCUSATE SODIUM 100 MG: 100 CAPSULE, LIQUID FILLED ORAL at 20:33

## 2024-02-06 RX ADMIN — CEFAZOLIN SODIUM 2 G: 2 INJECTION, SOLUTION INTRAVENOUS at 18:41

## 2024-02-06 RX ADMIN — DIPHENHYDRAMINE HYDROCHLORIDE 25 MG: 25 CAPSULE ORAL at 22:53

## 2024-02-06 RX ADMIN — POTASSIUM CHLORIDE 40 MEQ: 1500 TABLET, EXTENDED RELEASE ORAL at 20:32

## 2024-02-06 RX ADMIN — LORAZEPAM 0.5 MG: 0.5 TABLET ORAL at 18:13

## 2024-02-06 SDOH — ECONOMIC STABILITY: HOUSING INSECURITY
IN THE LAST 12 MONTHS, WAS THERE A TIME WHEN YOU DID NOT HAVE A STEADY PLACE TO SLEEP OR SLEPT IN A SHELTER (INCLUDING NOW)?: NO

## 2024-02-06 SDOH — SOCIAL STABILITY: SOCIAL INSECURITY: HAVE YOU HAD THOUGHTS OF HARMING ANYONE ELSE?: NO

## 2024-02-06 SDOH — ECONOMIC STABILITY: INCOME INSECURITY: IN THE LAST 12 MONTHS, WAS THERE A TIME WHEN YOU WERE NOT ABLE TO PAY THE MORTGAGE OR RENT ON TIME?: NO

## 2024-02-06 SDOH — SOCIAL STABILITY: SOCIAL INSECURITY: WERE YOU ABLE TO COMPLETE ALL THE BEHAVIORAL HEALTH SCREENINGS?: NO

## 2024-02-06 SDOH — SOCIAL STABILITY: SOCIAL INSECURITY: ABUSE: ADULT

## 2024-02-06 SDOH — SOCIAL STABILITY: SOCIAL INSECURITY: ARE YOU OR HAVE YOU BEEN THREATENED OR ABUSED PHYSICALLY, EMOTIONALLY, OR SEXUALLY BY ANYONE?: NO

## 2024-02-06 SDOH — SOCIAL STABILITY: SOCIAL INSECURITY: DOES ANYONE TRY TO KEEP YOU FROM HAVING/CONTACTING OTHER FRIENDS OR DOING THINGS OUTSIDE YOUR HOME?: NO

## 2024-02-06 SDOH — SOCIAL STABILITY: SOCIAL INSECURITY: HAS ANYONE EVER THREATENED TO HURT YOUR FAMILY OR YOUR PETS?: NO

## 2024-02-06 SDOH — ECONOMIC STABILITY: TRANSPORTATION INSECURITY
IN THE PAST 12 MONTHS, HAS LACK OF TRANSPORTATION KEPT YOU FROM MEETINGS, WORK, OR FROM GETTING THINGS NEEDED FOR DAILY LIVING?: NO

## 2024-02-06 SDOH — ECONOMIC STABILITY: HOUSING INSECURITY: IN THE LAST 12 MONTHS, HOW MANY PLACES HAVE YOU LIVED?: 1

## 2024-02-06 SDOH — SOCIAL STABILITY: SOCIAL INSECURITY: DO YOU FEEL ANYONE HAS EXPLOITED OR TAKEN ADVANTAGE OF YOU FINANCIALLY OR OF YOUR PERSONAL PROPERTY?: NO

## 2024-02-06 SDOH — SOCIAL STABILITY: SOCIAL INSECURITY: ARE THERE ANY APPARENT SIGNS OF INJURIES/BEHAVIORS THAT COULD BE RELATED TO ABUSE/NEGLECT?: NO

## 2024-02-06 SDOH — ECONOMIC STABILITY: TRANSPORTATION INSECURITY
IN THE PAST 12 MONTHS, HAS THE LACK OF TRANSPORTATION KEPT YOU FROM MEDICAL APPOINTMENTS OR FROM GETTING MEDICATIONS?: NO

## 2024-02-06 SDOH — ECONOMIC STABILITY: INCOME INSECURITY: HOW HARD IS IT FOR YOU TO PAY FOR THE VERY BASICS LIKE FOOD, HOUSING, MEDICAL CARE, AND HEATING?: NOT HARD AT ALL

## 2024-02-06 SDOH — SOCIAL STABILITY: SOCIAL INSECURITY: DO YOU FEEL UNSAFE GOING BACK TO THE PLACE WHERE YOU ARE LIVING?: NO

## 2024-02-06 ASSESSMENT — ACTIVITIES OF DAILY LIVING (ADL)
HEARING - RIGHT EAR: FUNCTIONAL
PATIENT'S MEMORY ADEQUATE TO SAFELY COMPLETE DAILY ACTIVITIES?: YES
FEEDING YOURSELF: INDEPENDENT
LACK_OF_TRANSPORTATION: NO
GROOMING: NEEDS ASSISTANCE
HEARING - LEFT EAR: FUNCTIONAL
WALKS IN HOME: INDEPENDENT
TOILETING: NEEDS ASSISTANCE
DRESSING YOURSELF: NEEDS ASSISTANCE
BATHING: INDEPENDENT
JUDGMENT_ADEQUATE_SAFELY_COMPLETE_DAILY_ACTIVITIES: YES
ADEQUATE_TO_COMPLETE_ADL: YES

## 2024-02-06 ASSESSMENT — COGNITIVE AND FUNCTIONAL STATUS - GENERAL
PATIENT BASELINE BEDBOUND: NO
PERSONAL GROOMING: A LITTLE
HELP NEEDED FOR BATHING: A LITTLE
PERSONAL GROOMING: A LITTLE
EATING MEALS: A LITTLE
DRESSING REGULAR UPPER BODY CLOTHING: A LITTLE
MOBILITY SCORE: 18
TURNING FROM BACK TO SIDE WHILE IN FLAT BAD: A LITTLE
DAILY ACTIVITIY SCORE: 18
STANDING UP FROM CHAIR USING ARMS: A LITTLE
TOILETING: A LITTLE
TOILETING: A LITTLE
DRESSING REGULAR LOWER BODY CLOTHING: A LITTLE
DAILY ACTIVITIY SCORE: 18
WALKING IN HOSPITAL ROOM: A LITTLE
TURNING FROM BACK TO SIDE WHILE IN FLAT BAD: A LITTLE
STANDING UP FROM CHAIR USING ARMS: A LITTLE
WALKING IN HOSPITAL ROOM: A LITTLE
MOVING TO AND FROM BED TO CHAIR: A LITTLE
EATING MEALS: A LITTLE
DRESSING REGULAR UPPER BODY CLOTHING: A LITTLE
HELP NEEDED FOR BATHING: A LITTLE
MOVING FROM LYING ON BACK TO SITTING ON SIDE OF FLAT BED WITH BEDRAILS: A LITTLE
CLIMB 3 TO 5 STEPS WITH RAILING: A LITTLE
MOVING FROM LYING ON BACK TO SITTING ON SIDE OF FLAT BED WITH BEDRAILS: A LITTLE
MOVING TO AND FROM BED TO CHAIR: A LITTLE
CLIMB 3 TO 5 STEPS WITH RAILING: A LITTLE
MOBILITY SCORE: 18
DRESSING REGULAR LOWER BODY CLOTHING: A LITTLE

## 2024-02-06 ASSESSMENT — PATIENT HEALTH QUESTIONNAIRE - PHQ9
2. FEELING DOWN, DEPRESSED OR HOPELESS: NOT AT ALL
1. LITTLE INTEREST OR PLEASURE IN DOING THINGS: NOT AT ALL
SUM OF ALL RESPONSES TO PHQ9 QUESTIONS 1 & 2: 0

## 2024-02-06 ASSESSMENT — LIFESTYLE VARIABLES
HOW OFTEN DO YOU HAVE 6 OR MORE DRINKS ON ONE OCCASION: NEVER
HOW OFTEN DO YOU HAVE A DRINK CONTAINING ALCOHOL: 2-4 TIMES A MONTH
AUDIT-C TOTAL SCORE: 2
HOW MANY STANDARD DRINKS CONTAINING ALCOHOL DO YOU HAVE ON A TYPICAL DAY: 1 OR 2
SKIP TO QUESTIONS 9-10: 1
AUDIT-C TOTAL SCORE: 2

## 2024-02-06 NOTE — CARE PLAN
The patient's goals for the shift include  managed pain     The clinical goals for the shift include patient will remain free from falls    Over the shift, the patient did not make progress toward the following goals. Barriers to progression include ***. Recommendations to address these barriers include ***.

## 2024-02-06 NOTE — H&P
History Of Present Illness  Iris Barth is a 43 y.o. female with PMHx of multicentric left breast cancer, clinical stage 1A, DVT s/p knee injury, s/p bilateral skin sparing mastectomies with placement of tissue expanders on 01/08/24. She presented to clinic today (2/6) with worsening tissue necrosis of the inferior medial quadrant of the right breast with exposed tissue expander. D/t to concern for exposed implant, pt directly admitted to [facility] for IV antibiotics and plans for OR tomorrow for explant removal and skin closure. Denies pain to bilateral breasts. Denies fever, chest pain, SOB, abd pain, n/v/d.      Past Medical History  Past Medical History:   Diagnosis Date    Abnormal finding on breast imaging 11/07/2023    Acid reflux     pepcis prn    Acute frontal sinusitis 10/30/2023    Acute viral syndrome 10/30/2023    Anxiety     Lorazepam as needed    Breast CA (CMS/HCC)     Constipation     COVID-19 12/09/2023    was given Paxlovid, fully recovered    DVT (deep vein thrombosis) in pregnancy     proved after left knee scope,  left calf , SQ heparin briefly with no further issues, requests post op Heparin for this procedure    Heart murmur     asymptomatic       Surgical History  Past Surgical History:   Procedure Laterality Date    BELT ABDOMINOPLASTY      BI STEREOTACTIC GUIDED BREAST RIGHT LOCALIZATION AND BIOPSY Right 11/10/2023    BI STEREOSTATIC GUIDED BREAST RIGHT LOCALIZATION AND BIOPSY 11/10/2023 Francia Krishnamurthy MD [facility]    BI US GUIDED BREAST LOCALIZATION AND BIOPSY LEFT Left 11/10/2023    BI US GUIDED BREAST LOCALIZATION AND BIOPSY LEFT 11/10/2023 Francia Krishnamurthy MD [facility]    KNEE SURGERY  08/31/2015    Knee Surgery, scopex2        Social History  She reports that she has never smoked. She has never been exposed to tobacco smoke. She has never used smokeless tobacco. She reports current alcohol use. She reports that she does not use drugs.    Family History  Family History   Problem Relation Name  Age of Onset    No Known Problems Mother      Blood clot Father      Skin cancer Father      No Known Problems Sister      No Known Problems Sister      Breast cancer Maternal Grandmother      Breast cancer Paternal Grandmother      Cancer Other grandparent         Allergies  Patient has no known allergies.     Physical Exam  Constitutional: NAD  Eyes: EOMI, clear sclera   ENMT: Moist mucous membranes, no apparent injuries or lesions  Head/Neck: NCAT  Cardiovascular: Extremities WWP  Respiratory/Thorax: Unlabored respirations on RA  Gastrointestinal: Abdomen soft, non-distended, non-tender  Genitourinary: Voiding spontaneously   Extremities: MAEx4  Neurological: A&Ox3  Psychological: Anxious  Skin: Warm and dry with no lesions or rashes  Breast: Right: Large area of full thickness tissue necrosis of the inferior medial quadrant of the breast with exposed tissue expander. Large amount of milky white drainage expressible from the breast.   Left: No hematoma. Incisions healing, small area of scabbing at triple point.     Assessment/Plan   Principal Problem:    Surgical site infection  Iris Barth is a 43 y.o. female s/p bilateral skin sparing mastectomies with placement of tissue expanders on 01/08/24. She presented to clinic today (2/6) with worsening tissue necrosis of the inferior medial quadrant of the right breast with exposed tissue expander. D/t to concern for exposed implant, pt directly admitted to [facility] for IV antibiotics and plans for OR tomorrow for explant removal and skin closure.     A/P:  #Exposed R tissue expander  OR tomorrow 2/7 for TE removal and closure, case requested  NPO @ mn, obtain preop labs: CBC, RFP, Mag, T&S  IV ancef 2g q8h  PRN dressing changes to R breast with gauze and ABD. Maintain surgical bra.     # Pain control  Scheduled Tylenol 975 mg q8h  Diphenhydramine 25mg PRN for sleep  Gabapentin 300mg BID   Consider Tramadol 50mg q6h for break thru pain if needed    #Reflux  Protonix  40mg daily      #Anxiety  Home Ativan 0.5mg q8h PRN if needed     FEN/GI:   - NPO @ mn   - Monitor electrolytes and replete PRN   - Bowel regimen with Colace BID   - IV Zofran PRN N/V    DVT ppx  - Hold prophylactic Lovenox 2/2 to OR tomorrow. Resume post-operatively   - SCD, encourage aggressive use of IS 10x every hour while awake    Dispo: OR tomorrow 2/7    Patient and plan discussed with Dr. Tala Lilly, PA-C  Plastic and Reconstructive Surgery  Epic chat, Pager [phone], Team phones: [phone]

## 2024-02-06 NOTE — PROGRESS NOTES
"                                      PLASTIC SURGERY CLINIC VISIT     Date: 2/6/2024  Date of Surgery: 01/08/24  Surgical Procedure: Bilateral skin sparing mastectomies with placement of tissue expanders.        HPI:   Iris Barth 43 y.o. female is here for post-operative appointment for the above procedure due to changes to the right breast.     Interval changes as of this date: Reports continued oozing from the right breast as well as odor.      MEDICATIONS     Current Outpatient Medications:     bran/gum/fib/marcel/psyl/kelp/pec (FIBER 6 ORAL), Take by mouth 2 times a day as needed., Disp: , Rfl:     cholecalciferol, vitamin D3, (VITAMIN D3 ORAL), Take by mouth once daily., Disp: , Rfl:     cyanocobalamin, vitamin B-12, (VITAMIN B-12 ORAL), Take by mouth once daily., Disp: , Rfl:     diphenhydrAMINE (Sominex) 25 mg tablet, Take 1 tablet (25 mg) by mouth if needed for sleep., Disp: , Rfl:     gabapentin (Neurontin) 300 mg capsule, Take 1 capsule (300 mg) by mouth 2 times a day for 14 days., Disp: 28 capsule, Rfl: 0    multivitamin tablet, Take 1 tablet by mouth once daily., Disp: , Rfl:     pantoprazole (ProtoNix) 40 mg EC tablet, Take 1 tablet (40 mg) by mouth once daily in the morning. Take before meals. prn, Disp: , Rfl:     semaglutide, weight loss, (Wegovy) 2.4 mg/0.75 mL pen injector, Inject 2.4 mg under the skin every 7 days., Disp: 9 mL, Rfl: 3    ZINC ACETATE ORAL, Use in the mouth or throat if needed., Disp: , Rfl:     aspirin 81 mg EC tablet, Take 1 tablet (81 mg) by mouth once daily. Do not start before January 10, 2024. (Patient not taking: Reported on 1/29/2024), Disp: 30 tablet, Rfl: 0      OBJECTIVE [x]Expand by Default  Blood pressure 122/83, pulse 88, resp. rate 16, height 1.676 m (5' 6\"), weight 76.4 kg (168 lb 8 oz).        REVIEW OF SYSTEMS:    Constitutional: negative for fevers, chills, unintentional weight loss  HEENT: negative for changes in vision, headaches, changes in hearing, " congestion, sore throat  Cardiovascular: negative for chest pain, palpitations  Respiratory: negative for cough, shortness of breath  Gastrointestinal: negative for nausea, vomiting, diarrhea  Genitourinary: negative for dysuria, hematuria  Musculoskeletal: negative for joint swelling or pain  Skin: negative for rashes or lesions  Psych: negative for depression, anxiety  Endocrine: negative for polyuria, polydipsia, cold/heat intolerance  Hem/Lymph: negative for bleeding disorder     PHYSICAL EXAM  General: alert and oriented, no apparent distress     Focused exam of the breasts:  Right: Large area of full thickness tissue necrosis of the inferior medial quadrant of the breast with exposed tissue expander. Large amount of milky white drainage expressible from the breast.   Left: No hematoma. Incisions healing, small area of scabbing at triple point.    ASSESSMENT/PLAN  Iris Barth 43 y.o. female who had Bilateral skin sparing mastectomies with placement of tissue expanders on 01/08/24 who presents for POV.     Worsening tissue necrosis of the inferior medial quadrant of the right breast now with exposed tissue expander. D/t to concern for exposed implant, pt directly admitted to [facility] for IV antibiotics and plans for OR tomorrow for explant removal and skin closure. In the meantime, gauze and ABD applied to right breast d/t drainage.      Upon admission recommend:  - IV ancef  - Preop labs  - PRN dressing changes to R breast with gauze and ABD   - NPO at midnight     Attending Attestation:  Ayah BOOTH MD, personal performed the history, exam, and decision making on this patient.

## 2024-02-07 ENCOUNTER — ANESTHESIA (OUTPATIENT)
Dept: OPERATING ROOM | Facility: HOSPITAL | Age: 44
DRG: 857 | End: 2024-02-07
Payer: COMMERCIAL

## 2024-02-07 ENCOUNTER — ANESTHESIA EVENT (OUTPATIENT)
Dept: OPERATING ROOM | Facility: HOSPITAL | Age: 44
DRG: 857 | End: 2024-02-07
Payer: COMMERCIAL

## 2024-02-07 PROBLEM — Z98.890 PONV (POSTOPERATIVE NAUSEA AND VOMITING): Status: ACTIVE | Noted: 2024-02-07

## 2024-02-07 PROBLEM — R11.2 PONV (POSTOPERATIVE NAUSEA AND VOMITING): Status: ACTIVE | Noted: 2024-02-07

## 2024-02-07 LAB
ABO GROUP (TYPE) IN BLOOD: NORMAL
ANTIBODY SCREEN: NORMAL
RH FACTOR (ANTIGEN D): NORMAL

## 2024-02-07 PROCEDURE — 2500000005 HC RX 250 GENERAL PHARMACY W/O HCPCS: Performed by: SURGERY

## 2024-02-07 PROCEDURE — 87206 SMEAR FLUORESCENT/ACID STAI: CPT

## 2024-02-07 PROCEDURE — 2500000004 HC RX 250 GENERAL PHARMACY W/ HCPCS (ALT 636 FOR OP/ED): Performed by: STUDENT IN AN ORGANIZED HEALTH CARE EDUCATION/TRAINING PROGRAM

## 2024-02-07 PROCEDURE — 2500000005 HC RX 250 GENERAL PHARMACY W/O HCPCS: Performed by: NURSE ANESTHETIST, CERTIFIED REGISTERED

## 2024-02-07 PROCEDURE — 0JB60ZZ EXCISION OF CHEST SUBCUTANEOUS TISSUE AND FASCIA, OPEN APPROACH: ICD-10-PCS | Performed by: SURGERY

## 2024-02-07 PROCEDURE — 3600000008 HC OR TIME - EACH INCREMENTAL 1 MINUTE - PROCEDURE LEVEL THREE: Performed by: SURGERY

## 2024-02-07 PROCEDURE — 7100000002 HC RECOVERY ROOM TIME - EACH INCREMENTAL 1 MINUTE: Performed by: SURGERY

## 2024-02-07 PROCEDURE — 86901 BLOOD TYPING SEROLOGIC RH(D): CPT

## 2024-02-07 PROCEDURE — 36415 COLL VENOUS BLD VENIPUNCTURE: CPT

## 2024-02-07 PROCEDURE — 3700000002 HC GENERAL ANESTHESIA TIME - EACH INCREMENTAL 1 MINUTE: Performed by: SURGERY

## 2024-02-07 PROCEDURE — 3700000001 HC GENERAL ANESTHESIA TIME - INITIAL BASE CHARGE: Performed by: SURGERY

## 2024-02-07 PROCEDURE — 87116 MYCOBACTERIA CULTURE: CPT

## 2024-02-07 PROCEDURE — 2500000001 HC RX 250 WO HCPCS SELF ADMINISTERED DRUGS (ALT 637 FOR MEDICARE OP)

## 2024-02-07 PROCEDURE — 0HPT0NZ REMOVAL OF TISSUE EXPANDER FROM RIGHT BREAST, OPEN APPROACH: ICD-10-PCS | Performed by: SURGERY

## 2024-02-07 PROCEDURE — 2500000004 HC RX 250 GENERAL PHARMACY W/ HCPCS (ALT 636 FOR OP/ED)

## 2024-02-07 PROCEDURE — 87015 SPECIMEN INFECT AGNT CONCNTJ: CPT | Performed by: SURGERY

## 2024-02-07 PROCEDURE — 2720000007 HC OR 272 NO HCPCS: Performed by: SURGERY

## 2024-02-07 PROCEDURE — 87186 SC STD MICRODIL/AGAR DIL: CPT | Performed by: SURGERY

## 2024-02-07 PROCEDURE — 1170000001 HC PRIVATE ONCOLOGY ROOM DAILY

## 2024-02-07 PROCEDURE — 99232 SBSQ HOSP IP/OBS MODERATE 35: CPT

## 2024-02-07 PROCEDURE — 0HX5XZZ TRANSFER CHEST SKIN, EXTERNAL APPROACH: ICD-10-PCS | Performed by: SURGERY

## 2024-02-07 PROCEDURE — 14301 TIS TRNFR ANY 30.1-60 SQ CM: CPT

## 2024-02-07 PROCEDURE — 7100000001 HC RECOVERY ROOM TIME - INITIAL BASE CHARGE: Performed by: SURGERY

## 2024-02-07 PROCEDURE — A11971 PR REMOVE TISSUE EXPANDER(S): Performed by: NURSE ANESTHETIST, CERTIFIED REGISTERED

## 2024-02-07 PROCEDURE — 14302 TIS TRNFR ADDL 30 SQ CM: CPT | Performed by: SURGERY

## 2024-02-07 PROCEDURE — 2500000004 HC RX 250 GENERAL PHARMACY W/ HCPCS (ALT 636 FOR OP/ED): Performed by: NURSE ANESTHETIST, CERTIFIED REGISTERED

## 2024-02-07 PROCEDURE — A11971 PR REMOVE TISSUE EXPANDER(S): Performed by: STUDENT IN AN ORGANIZED HEALTH CARE EDUCATION/TRAINING PROGRAM

## 2024-02-07 PROCEDURE — 14301 TIS TRNFR ANY 30.1-60 SQ CM: CPT | Performed by: SURGERY

## 2024-02-07 PROCEDURE — 88300 SURGICAL PATH GROSS: CPT | Mod: TC,SUR | Performed by: PHYSICIAN ASSISTANT

## 2024-02-07 PROCEDURE — 11971 RMVL TIS XPNDR WO INSJ IMPLT: CPT

## 2024-02-07 PROCEDURE — 3600000003 HC OR TIME - INITIAL BASE CHARGE - PROCEDURE LEVEL THREE: Performed by: SURGERY

## 2024-02-07 PROCEDURE — 87015 SPECIMEN INFECT AGNT CONCNTJ: CPT

## 2024-02-07 PROCEDURE — 11971 RMVL TIS XPNDR WO INSJ IMPLT: CPT | Performed by: SURGERY

## 2024-02-07 PROCEDURE — 2500000001 HC RX 250 WO HCPCS SELF ADMINISTERED DRUGS (ALT 637 FOR MEDICARE OP): Performed by: SURGERY

## 2024-02-07 PROCEDURE — 87015 SPECIMEN INFECT AGNT CONCNTJ: CPT | Mod: OUT | Performed by: SURGERY

## 2024-02-07 PROCEDURE — 88300 SURGICAL PATH GROSS: CPT | Performed by: STUDENT IN AN ORGANIZED HEALTH CARE EDUCATION/TRAINING PROGRAM

## 2024-02-07 PROCEDURE — 14302 TIS TRNFR ADDL 30 SQ CM: CPT

## 2024-02-07 RX ORDER — MIDAZOLAM HYDROCHLORIDE 1 MG/ML
INJECTION, SOLUTION INTRAMUSCULAR; INTRAVENOUS AS NEEDED
Status: DISCONTINUED | OUTPATIENT
Start: 2024-02-07 | End: 2024-02-07

## 2024-02-07 RX ORDER — CELECOXIB 200 MG/1
200 CAPSULE ORAL EVERY 12 HOURS
Qty: 14 CAPSULE | Refills: 0 | Status: SHIPPED | OUTPATIENT
Start: 2024-02-07 | End: 2024-02-14

## 2024-02-07 RX ORDER — ROCURONIUM BROMIDE 10 MG/ML
INJECTION, SOLUTION INTRAVENOUS AS NEEDED
Status: DISCONTINUED | OUTPATIENT
Start: 2024-02-07 | End: 2024-02-07

## 2024-02-07 RX ORDER — HYDROMORPHONE HYDROCHLORIDE 1 MG/ML
0.5 INJECTION, SOLUTION INTRAMUSCULAR; INTRAVENOUS; SUBCUTANEOUS EVERY 5 MIN PRN
Status: DISCONTINUED | OUTPATIENT
Start: 2024-02-07 | End: 2024-02-07 | Stop reason: HOSPADM

## 2024-02-07 RX ORDER — DOCUSATE SODIUM 100 MG/1
100 CAPSULE, LIQUID FILLED ORAL 2 TIMES DAILY
Qty: 10 CAPSULE | Refills: 0 | Status: SHIPPED | OUTPATIENT
Start: 2024-02-07 | End: 2024-02-12

## 2024-02-07 RX ORDER — LIDOCAINE HYDROCHLORIDE 10 MG/ML
0.1 INJECTION INFILTRATION; PERINEURAL ONCE
Status: DISCONTINUED | OUTPATIENT
Start: 2024-02-07 | End: 2024-02-07 | Stop reason: HOSPADM

## 2024-02-07 RX ORDER — CELECOXIB 200 MG/1
200 CAPSULE ORAL 2 TIMES DAILY
Status: DISCONTINUED | OUTPATIENT
Start: 2024-02-07 | End: 2024-02-08 | Stop reason: HOSPADM

## 2024-02-07 RX ORDER — GABAPENTIN 300 MG/1
300 CAPSULE ORAL 2 TIMES DAILY
Qty: 28 CAPSULE | Refills: 0 | Status: SHIPPED | OUTPATIENT
Start: 2024-02-07 | End: 2024-05-15 | Stop reason: WASHOUT

## 2024-02-07 RX ORDER — GLYCOPYRROLATE 0.2 MG/ML
INJECTION INTRAMUSCULAR; INTRAVENOUS AS NEEDED
Status: DISCONTINUED | OUTPATIENT
Start: 2024-02-07 | End: 2024-02-07

## 2024-02-07 RX ORDER — ACETAMINOPHEN 325 MG/1
975 TABLET ORAL EVERY 8 HOURS
Qty: 126 TABLET | Refills: 0 | Status: SHIPPED | OUTPATIENT
Start: 2024-02-07 | End: 2024-02-21

## 2024-02-07 RX ORDER — HYDROMORPHONE HYDROCHLORIDE 1 MG/ML
INJECTION, SOLUTION INTRAMUSCULAR; INTRAVENOUS; SUBCUTANEOUS AS NEEDED
Status: DISCONTINUED | OUTPATIENT
Start: 2024-02-07 | End: 2024-02-07

## 2024-02-07 RX ORDER — HYDROMORPHONE HYDROCHLORIDE 1 MG/ML
0.2 INJECTION, SOLUTION INTRAMUSCULAR; INTRAVENOUS; SUBCUTANEOUS EVERY 5 MIN PRN
Status: DISCONTINUED | OUTPATIENT
Start: 2024-02-07 | End: 2024-02-07 | Stop reason: HOSPADM

## 2024-02-07 RX ORDER — NEOSTIGMINE METHYLSULFATE 0.5 MG/ML
INJECTION, SOLUTION INTRAVENOUS AS NEEDED
Status: DISCONTINUED | OUTPATIENT
Start: 2024-02-07 | End: 2024-02-07

## 2024-02-07 RX ORDER — PROPOFOL 10 MG/ML
INJECTION, EMULSION INTRAVENOUS CONTINUOUS PRN
Status: DISCONTINUED | OUTPATIENT
Start: 2024-02-07 | End: 2024-02-07

## 2024-02-07 RX ORDER — KETOROLAC TROMETHAMINE 30 MG/ML
30 INJECTION, SOLUTION INTRAMUSCULAR; INTRAVENOUS ONCE
Status: COMPLETED | OUTPATIENT
Start: 2024-02-07 | End: 2024-02-07

## 2024-02-07 RX ORDER — ONDANSETRON HYDROCHLORIDE 2 MG/ML
INJECTION, SOLUTION INTRAVENOUS AS NEEDED
Status: DISCONTINUED | OUTPATIENT
Start: 2024-02-07 | End: 2024-02-07

## 2024-02-07 RX ORDER — ONDANSETRON HYDROCHLORIDE 2 MG/ML
4 INJECTION, SOLUTION INTRAVENOUS ONCE AS NEEDED
Status: COMPLETED | OUTPATIENT
Start: 2024-02-07 | End: 2024-02-07

## 2024-02-07 RX ORDER — TRAMADOL HYDROCHLORIDE 50 MG/1
50 TABLET ORAL EVERY 6 HOURS PRN
Qty: 20 TABLET | Refills: 0 | Status: SHIPPED | OUTPATIENT
Start: 2024-02-07 | End: 2024-02-12

## 2024-02-07 RX ORDER — KETOROLAC TROMETHAMINE 30 MG/ML
INJECTION, SOLUTION INTRAMUSCULAR; INTRAVENOUS AS NEEDED
Status: DISCONTINUED | OUTPATIENT
Start: 2024-02-07 | End: 2024-02-07

## 2024-02-07 RX ORDER — SODIUM CHLORIDE, SODIUM LACTATE, POTASSIUM CHLORIDE, CALCIUM CHLORIDE 600; 310; 30; 20 MG/100ML; MG/100ML; MG/100ML; MG/100ML
100 INJECTION, SOLUTION INTRAVENOUS CONTINUOUS
Status: DISCONTINUED | OUTPATIENT
Start: 2024-02-07 | End: 2024-02-07 | Stop reason: HOSPADM

## 2024-02-07 RX ORDER — HYDROGEN PEROXIDE 3 %
SOLUTION, NON-ORAL MISCELLANEOUS AS NEEDED
Status: DISCONTINUED | OUTPATIENT
Start: 2024-02-07 | End: 2024-02-07 | Stop reason: HOSPADM

## 2024-02-07 RX ORDER — DEXAMETHASONE SODIUM PHOSPHATE 4 MG/ML
INJECTION, SOLUTION INTRA-ARTICULAR; INTRALESIONAL; INTRAMUSCULAR; INTRAVENOUS; SOFT TISSUE AS NEEDED
Status: DISCONTINUED | OUTPATIENT
Start: 2024-02-07 | End: 2024-02-07

## 2024-02-07 RX ORDER — LIDOCAINE HYDROCHLORIDE 20 MG/ML
INJECTION, SOLUTION EPIDURAL; INFILTRATION; INTRACAUDAL; PERINEURAL AS NEEDED
Status: DISCONTINUED | OUTPATIENT
Start: 2024-02-07 | End: 2024-02-07

## 2024-02-07 RX ORDER — ACETAMINOPHEN 10 MG/ML
INJECTION, SOLUTION INTRAVENOUS AS NEEDED
Status: DISCONTINUED | OUTPATIENT
Start: 2024-02-07 | End: 2024-02-07

## 2024-02-07 RX ORDER — BUPIVACAINE HCL/EPINEPHRINE 0.5-1:200K
VIAL (ML) INJECTION AS NEEDED
Status: DISCONTINUED | OUTPATIENT
Start: 2024-02-07 | End: 2024-02-07 | Stop reason: HOSPADM

## 2024-02-07 RX ORDER — FENTANYL CITRATE 50 UG/ML
INJECTION, SOLUTION INTRAMUSCULAR; INTRAVENOUS AS NEEDED
Status: DISCONTINUED | OUTPATIENT
Start: 2024-02-07 | End: 2024-02-07

## 2024-02-07 RX ORDER — PROPOFOL 10 MG/ML
INJECTION, EMULSION INTRAVENOUS AS NEEDED
Status: DISCONTINUED | OUTPATIENT
Start: 2024-02-07 | End: 2024-02-07

## 2024-02-07 RX ORDER — CEFAZOLIN 1 G/1
INJECTION, POWDER, FOR SOLUTION INTRAVENOUS AS NEEDED
Status: DISCONTINUED | OUTPATIENT
Start: 2024-02-07 | End: 2024-02-07

## 2024-02-07 RX ORDER — SODIUM CHLORIDE, SODIUM LACTATE, POTASSIUM CHLORIDE, CALCIUM CHLORIDE 600; 310; 30; 20 MG/100ML; MG/100ML; MG/100ML; MG/100ML
INJECTION, SOLUTION INTRAVENOUS CONTINUOUS PRN
Status: DISCONTINUED | OUTPATIENT
Start: 2024-02-07 | End: 2024-02-07

## 2024-02-07 RX ORDER — AMOXICILLIN AND CLAVULANATE POTASSIUM 875; 125 MG/1; MG/1
875 TABLET, FILM COATED ORAL EVERY 12 HOURS
Qty: 20 TABLET | Refills: 0 | Status: SHIPPED | OUTPATIENT
Start: 2024-02-07 | End: 2024-02-10 | Stop reason: ALTCHOICE

## 2024-02-07 RX ORDER — IBUPROFEN 600 MG/1
600 TABLET ORAL EVERY 8 HOURS PRN
Qty: 42 TABLET | Refills: 0 | Status: SHIPPED | OUTPATIENT
Start: 2024-02-07 | End: 2024-02-21

## 2024-02-07 RX ADMIN — CEFAZOLIN 2 G: 330 INJECTION, POWDER, FOR SOLUTION INTRAMUSCULAR; INTRAVENOUS at 09:00

## 2024-02-07 RX ADMIN — LIDOCAINE HYDROCHLORIDE 80 MG: 20 INJECTION, SOLUTION EPIDURAL; INFILTRATION; INTRACAUDAL; PERINEURAL at 08:43

## 2024-02-07 RX ADMIN — CEFAZOLIN SODIUM 2 G: 2 INJECTION, SOLUTION INTRAVENOUS at 17:21

## 2024-02-07 RX ADMIN — FENTANYL CITRATE 50 MCG: 50 INJECTION, SOLUTION INTRAMUSCULAR; INTRAVENOUS at 08:43

## 2024-02-07 RX ADMIN — ACETAMINOPHEN 975 MG: 325 TABLET ORAL at 17:21

## 2024-02-07 RX ADMIN — CEFAZOLIN SODIUM 2 G: 2 INJECTION, SOLUTION INTRAVENOUS at 02:24

## 2024-02-07 RX ADMIN — HYDROMORPHONE HYDROCHLORIDE 0.5 MG: 1 INJECTION, SOLUTION INTRAMUSCULAR; INTRAVENOUS; SUBCUTANEOUS at 11:07

## 2024-02-07 RX ADMIN — SODIUM CHLORIDE, POTASSIUM CHLORIDE, SODIUM LACTATE AND CALCIUM CHLORIDE: 600; 310; 30; 20 INJECTION, SOLUTION INTRAVENOUS at 08:10

## 2024-02-07 RX ADMIN — NEOSTIGMINE METHYLSULFATE 3 MG: 0.5 INJECTION, SOLUTION INTRAVENOUS at 11:11

## 2024-02-07 RX ADMIN — DEXAMETHASONE SODIUM PHOSPHATE 6 MG: 4 INJECTION, SOLUTION INTRAMUSCULAR; INTRAVENOUS at 08:57

## 2024-02-07 RX ADMIN — FENTANYL CITRATE 50 MCG: 50 INJECTION, SOLUTION INTRAMUSCULAR; INTRAVENOUS at 09:18

## 2024-02-07 RX ADMIN — DOCUSATE SODIUM 100 MG: 100 CAPSULE, LIQUID FILLED ORAL at 20:11

## 2024-02-07 RX ADMIN — CELECOXIB 200 MG: 200 CAPSULE ORAL at 20:11

## 2024-02-07 RX ADMIN — GLYCOPYRROLATE 0.4 MG: 0.2 INJECTION INTRAMUSCULAR; INTRAVENOUS at 11:11

## 2024-02-07 RX ADMIN — GABAPENTIN 300 MG: 300 CAPSULE ORAL at 20:11

## 2024-02-07 RX ADMIN — PROPOFOL 25 MCG/KG/MIN: 10 INJECTION, EMULSION INTRAVENOUS at 09:07

## 2024-02-07 RX ADMIN — PROPOFOL 120 MG: 10 INJECTION, EMULSION INTRAVENOUS at 08:44

## 2024-02-07 RX ADMIN — ONDANSETRON 4 MG: 2 INJECTION INTRAMUSCULAR; INTRAVENOUS at 11:49

## 2024-02-07 RX ADMIN — ROCURONIUM BROMIDE 50 MG: 10 INJECTION, SOLUTION INTRAVENOUS at 08:44

## 2024-02-07 RX ADMIN — ACETAMINOPHEN 1000 MG: 10 INJECTION, SOLUTION INTRAVENOUS at 09:21

## 2024-02-07 RX ADMIN — ROCURONIUM BROMIDE 10 MG: 10 INJECTION, SOLUTION INTRAVENOUS at 09:55

## 2024-02-07 RX ADMIN — MIDAZOLAM 2 MG: 1 INJECTION INTRAMUSCULAR; INTRAVENOUS at 08:30

## 2024-02-07 RX ADMIN — KETOROLAC TROMETHAMINE 30 MG: 30 INJECTION, SOLUTION INTRAMUSCULAR at 10:49

## 2024-02-07 RX ADMIN — KETOROLAC TROMETHAMINE 30 MG: 30 INJECTION, SOLUTION INTRAMUSCULAR; INTRAVENOUS at 13:39

## 2024-02-07 RX ADMIN — ONDANSETRON 4 MG: 2 INJECTION, SOLUTION INTRAMUSCULAR; INTRAVENOUS at 10:48

## 2024-02-07 RX ADMIN — PROMETHAZINE HYDROCHLORIDE 6.25 MG: 25 INJECTION INTRAMUSCULAR; INTRAVENOUS at 12:01

## 2024-02-07 RX ADMIN — ACETAMINOPHEN 975 MG: 325 TABLET ORAL at 02:24

## 2024-02-07 SDOH — HEALTH STABILITY: MENTAL HEALTH: CURRENT SMOKER: 0

## 2024-02-07 ASSESSMENT — PAIN SCALES - GENERAL
PAINLEVEL_OUTOF10: 5 - MODERATE PAIN
PAINLEVEL_OUTOF10: 0 - NO PAIN
PAINLEVEL_OUTOF10: 7
PAINLEVEL_OUTOF10: 0 - NO PAIN
PAINLEVEL_OUTOF10: 5 - MODERATE PAIN

## 2024-02-07 ASSESSMENT — PAIN - FUNCTIONAL ASSESSMENT
PAIN_FUNCTIONAL_ASSESSMENT: 0-10
PAIN_FUNCTIONAL_ASSESSMENT: UNABLE TO SELF-REPORT
PAIN_FUNCTIONAL_ASSESSMENT: 0-10

## 2024-02-07 ASSESSMENT — PAIN DESCRIPTION - LOCATION: LOCATION: CHEST

## 2024-02-07 ASSESSMENT — PAIN DESCRIPTION - ORIENTATION: ORIENTATION: RIGHT

## 2024-02-07 NOTE — PROGRESS NOTES
Iris Barth is a 43 y.o. female on day 1 of admission presenting with Surgical site infection.    Subjective   Patient evaluated post-operatively. Notes some intermittent shooting pain in her R axilla relieved with pain medication. Tolerating PO intake. Denies n/v. Voiding without issues. Denies fever, chest pain, SOB, abd pain.       Objective     Physical Exam  Constitutional: NAD  Eyes: EOMI, clear sclera   ENMT: Moist mucous membranes, no apparent injuries or lesions  Head/Neck: NCAT  Cardiovascular: Extremities WWP  Respiratory/Thorax: Unlabored respirations on RA  Gastrointestinal: Abdomen soft, non-distended, non-tender  Genitourinary: Voiding spontaneously   Extremities: MAEx4  Neurological: A&Ox3  Psychological: Anxious  Skin: Warm and dry with no lesions or rashes  Breast: Right: Yolie vac intact maintaining suction with no leaks or alarms. No surrounding erythema, edema.  X2 DIANNA drains with sanguinous output.   Left: Incisions healing covered with steristrips. No surrounding redness, drainage.       Assessment/Plan   Principal Problem:    Surgical site infection  Active Problems:    PONV (postoperative nausea and vomiting)    Infection of right breast    Iris Barth is a 43 y.o. female s/p bilateral skin sparing mastectomies with placement of tissue expanders on 01/08/24. She presented to clinic today (2/6) with worsening tissue necrosis of the inferior medial quadrant of the right breast with exposed tissue expander. D/t to concern for exposed implant, [place] [facility] [place] and plans for OR tomorrow for explant removal and skin closure.      A/P:  #S/p R breast debridement, TE removal, local tissue rearrangement, complex closure, and vac placement  Follow up OR cultures 2/7 (R breast TE, R breast capsule, and R breast tissue)  IV ancef 2g q8h. Transition to PO augmentin at discharge x 10 days   Monitor x2 DIANNA drains to R breast  -> Nursing to monitor and record output  q4h   -> Strip drain tubing upon emptying   -> Alert plastics if drain output exceeds 100cc in 1 hr  Maintain Yolie wound vac 10-14 days. Remove at follow up appt 2/20       ->125 mmHg continuous pressure       -> Alert plastic surgery team immediately with any concern with vac               -> Monitor vac output  Do not elevate R arm above 45 degrees to limit tension   Do not wear surgical bra to limit compression  Do not push, pull, or lift objects greater than 5 pounds     # Pain control  Scheduled Tylenol 975 mg q8h  Diphenhydramine 25mg PRN for sleep  Gabapentin 300mg BID   Celebrex 200mg BID  One time dose IV toradol post-op  Consider Tramadol 50mg q6h for break thru pain if needed     #Reflux  Protonix 40mg daily       #Anxiety  Home Ativan 0.5mg q8h PRN if needed      FEN/GI:   - Regular diet   - Monitor electrolytes and replete PRN   - Bowel regimen with Colace BID   - IV Zofran PRN N/V     DVT ppx  - Hold prophylactic Lovenox  - SCD, encourage aggressive use of IS 10x every hour while awake     Dispo: Discharge tomororw pending pain control. Follow up appointment requested for 2/20. Order rec completed.      Patient and plan discussed with MISAEL Choi-C  Plastic and Reconstructive Surgery  Epic chat, Pager [phone], Team phones: [phone]

## 2024-02-07 NOTE — ANESTHESIA POSTPROCEDURE EVALUATION
Patient: Iris Barth    Procedure Summary       Date: 02/07/24 Room / Location: [facility] OR 22 / [facility] OR    Anesthesia Start: 0832 Anesthesia Stop: 1144    Procedures:       Removal Implant Breast, post lavage (Right: Breast)      Wound Vac Application / Change (Right: Breast) Diagnosis:       Infection of right breast      (Infection of right breast [N61.0])    Surgeons: Ayah Edgar MD Responsible Provider: Jaylene Xie MD    Anesthesia Type: general ASA Status: 3            Anesthesia Type: general    Vitals Value Taken Time   /69 02/07/24 1200   Temp 36.1 °C (97 °F) 02/07/24 1145   Pulse 77 02/07/24 1206   Resp 5 02/07/24 1206   SpO2 98 % 02/07/24 1206   Vitals shown include unvalidated device data.    Anesthesia Post Evaluation    Patient location during evaluation: PACU  Patient participation: complete - patient participated  Level of consciousness: awake and alert  Pain management: satisfactory to patient  Airway patency: patent  Cardiovascular status: acceptable and blood pressure returned to baseline  Respiratory status: acceptable  Hydration status: acceptable  Postoperative Nausea and Vomiting: none    No notable events documented.

## 2024-02-07 NOTE — DISCHARGE INSTRUCTIONS
Plastic Surgery Post Discharge Instructions  You were admitted to [facility] for postoperative monitoring and control of pain following R breast washout, tissue expander removal, local tissue rearrangement, closure, and wound vac placement on 2/7/24 with Dr. Edgar of plastic surgery. Included below are post-discharge instructions and details regarding follow-up.    Thank you for allowing us to participate in your care and we wish you the best!    Best Regards,  [facility]  Department of Plastic and Reconstructive Surgery    Activity:  Activity as tolerated  Positioning: Do not lift right arm above 45 degrees   No pushing, pulling, or lifting objects greater than 5 pounds  Do not wear surgical bra to avoid additional compression  May shower waist down. Avoid wetting wound vac dressing or device.     Diet:  Resume regular diet. Ensure that you are drinking an adequate amount of fluids to maintain hydration.    Surgical Site/Wound Care:  Wound care:   Left breast: Incision lines can be left open to air. Avoid application of creams, lotions or ointments to surgical site, no soaking or scrubbing of surgical sites.     Vac:  You are being discharged with an incisional wound vac in place over a closed surgical incision. Please allow Prevena wound vac dressing to remain in place until output follow-up with plastic surgery. When showering, do not allow the wound vac dressing or device to become wet. When resting, please make sure to plug in the device with the supplied power cord to maintain the battery. The device has a power button at the center which is encircled by green lights. There will be one less light illuminated each day (every 24 hours) which is to be expected, and signifies the count down of the duration of the vac device. If you experience any issues with your wound vac including alarms, malfunction or dressing issues please refer to the provided instruction  manual or contact the plastic surgery office at [phone].     Drain care: (x2 to Right breast)  To empty the drain, open the cap, tip into cup and squeeze to empty. Squeeze drain flat then replace the cap.  Please empty the drain and record its output 3 times a day and bring these numbers to your follow up appointment.  The drain output should decrease and the color of the drainage should become lighter (red to pink to yellow).  This drain is sutured into place.  Keep the area around the drain clean and dry.  You may use mild soap and water to cleanse around the drain.  It is ok to shower; do not soak in a tub. Change the gauze around the drain as needed.  Call the office if you notice drastic changes in drain output, bloody drain output, or redness/drainage around insertion site.    Medication Instructions:  You may resume use of your home prescribed mediations as previously directed following discharge from the hospital. If you were taking medications prior to your surgery, and they are not listed on your discharge homegoing instructions medications list, consult your MD before you resume these medications.    Some postoperative pain is not unusual. This is usually relieved by taking prescribed or over the counter Acetaminophen/Tylenol, Motrin/ibuprofen. In cases of severe pain, you may use prescribed Tramadol as directed. Severe pain despite administration of pain medication must be reported to your physician.    Remember when taking Acetaminophen, do NOT exceed more than 1000 milligrams (mg) per dose or more than 4000 mg total per day. Taking too much Acetaminophen at one time can damage your liver. The maximum amount of ibuprofen in adults is 800 mg per dose or 3200 mg per day. Call your MD if you have any questions about your medications. To prevent constipation while taking narcotic pain medications, please utilize your prescribed bowel regimen, ensure that you drink plenty of water, eat fiber rich foods  (a good source is fruit) and increase activity progressively.    DO NOT drive a car while utilizing narcotic pain medications and until cleared by MD at follow-up appointment. Driving or operating heavy machinery, lawnmowers or power tools while taking opiod/narcotic pain medications may impair your judgement.    Call Physician If:  Contact the plastic surgery office for any questions and/or concerns regarding the surgical incision/site.  1. [phone] if Monday-Friday (8 a.m. - 4:30 p.m.)  2. [phone] and ask for the Plastic Surgery team on call provider if after hours or on weekends  3. Email [email or web address] for any non-urgent concerns     Call your MD or seek immediate medical attention if you experience any of the following symptoms:  1. Fever of 101.5 (38.5 C) or greater  2. Pain not controlled with prescribed pain medications  3. Uncontrolled nausea and/or vomiting  4. Drainage or swelling around your incisions and/or surgical sites   5. Separation of incisions, or tearing of the incision line  6. Large fluid collection under or around the incision or flap sites   7. Flap discoloration (including darkened appearance)  8. Difficulty breathing  9. Swelling, pain, heat and/or redness in your legs and/or calves  10. Inability to tolerate diet/fluid intake    Follow-up/Post Discharge Appointments:  Follow-up care is a key part of your treatment and safety. It is very important that you maintain follow-up care as directed so that your surgical site heals properly and does not lead to problems. Always carry a current medication list with you and bring it to ALL healthcare Provider visits. Be sure to maintain follow up with plastic surgery at your scheduled appointment. If you are unable to keep your appointment, or need to reschedule please contact our office at [phone].    2/20/24: Time TBD. Our office will call to schedule. Please check [email or web address] to confirm date/time/location.

## 2024-02-07 NOTE — CARE PLAN
The patient's goals for the shift include  get some rest and not be in pain.    The clinical goals for the shift include patient will remain free from falls    Very pleasant 42 yo female . VSS AOX4. Spouse remains at bedside. Had mostly a restful night. Educated pt on have sticks, IVs and BP on done on right arm.  Plan for surgery this AM. NPO since MN. CHG bath done.    Problem: Anxiety  Goal: STG: Patient will attend at least 80% of scheduled M sessions  Outcome: Progressing  Goal: STG: Iris will complete at least 80% of assigned homework   Outcome: Progressing  Goal: STG: Iris will bring completed homework to follow up sessions  Outcome: Progressing  Goal: STG: Iris will identify/communicate/explore challenges/barriers in completing homework  Outcome: Progressing  Goal: STG: Iris will practice recommended interventions outside of counseling sessions  Outcome: Progressing  Goal: STG: Iris will follow through with Treatment Referrals and resources as provided  Outcome: Progressing  Goal: LTG:  Outcome: Progressing

## 2024-02-07 NOTE — HOSPITAL COURSE
Iris Barth is a 43 y.o. female with PMHx of multicentric left breast cancer, clinical stage 1A, DVT s/p knee injury, s/p bilateral skin sparing mastectomies with placement of tissue expanders on 01/08/24. She presented to clinic (2/6) with worsening tissue necrosis of the inferior medial quadrant of the right breast with exposed tissue expander. D/t to concern for exposed implant, [place] [facility] [place] and is s/p explant removal and skin closure on 2/7 with Dr. Edgar. Patient did well post-operatively. She was admitted OVN for observation.     On day of discharge, vitals signs were stable. The patient was tolerating their diet, pain was controlled on PO pain medication, and they were ambulating and voiding spontaneously. They were discharged in stable condition with instructions to follow up as outpatient with Dr. Edgar.

## 2024-02-07 NOTE — PROGRESS NOTES
Pharmacy Medication History Review    Iris Barth is a 43 y.o. female admitted for Surgical site infection. Pharmacy reviewed the patient's [id number] medications and allergies for accuracy.    The list below reflects the updated PTA list. Comments regarding how patient may be taking medications differently can be found in the Admit Orders Activity  Prior to Admission Medications   Prescriptions Last Dose Informant   IBUPROFEN ORAL  Self   Sig: Take 200 mg by mouth every 6 hours if needed (pain (alternates with tylenol)).   LORazepam (Ativan) 0.5 mg tablet  Self   Sig: Take 1 tablet (0.5 mg) by mouth 3 times a day as needed (anxiety).   acetaminophen (Tylenol) 500 mg tablet  Self   Sig: Take by mouth every 6 hours if needed for mild pain (1 - 3).   cetirizine (ZyrTEC) 10 mg tablet  Self   Sig: Take 1 tablet (10 mg) by mouth once daily as needed for allergies.   cholecalciferol (Vitamin D-3) 25 MCG (1000 UT) tablet  Self   Sig: Take 1 tablet (25 mcg) by mouth once daily.   cyanocobalamin, vitamin B-12, (VITAMIN B-12 ORAL)  Self   Sig: Take 1,000 mcg by mouth once daily.   diphenhydrAMINE (Sominex) 25 mg tablet  Self   Sig: Take 1 tablet (25 mg) by mouth if needed for sleep.   diphenhydrAMINE-acetaminophen (Tylenol PM)  mg per tablet  Self   Sig: Take 1 tablet by mouth as needed at bedtime for sleep.   fluticasone (Flonase) 50 mcg/actuation nasal spray  Self   Sig: Administer 1 spray into each nostril once daily as needed for rhinitis or allergies. Shake gently. Before first use, prime pump. After use, clean tip and replace cap.   gabapentin (Neurontin) 300 mg capsule  Self   Sig: Take 1 capsule (300 mg) by mouth 2 times a day for 14 days.   multivitamin-children's (Cerovite, Jr) chewable tablet  Self   Sig: Chew 2 tablets once daily. Flintstones chewable   semaglutide, weight loss, (Wegovy) 2.4 mg/0.75 mL pen injector 1/24/2024 Self   Sig: Inject 2.4 mg under the skin every 7 days.   zinc acetate  50 mg (zinc) capsule  Self   Sig: Take 1 tablet by mouth once daily.      Facility-Administered Medications: None        The list below reflects the updated allergy list. Please review each documented allergy for additional clarification and justification.  Allergies  Reviewed by Monica Meeks RN on 2/6/2024   No Known Allergies         Patient declines M2B at discharge. Pharmacy has been updated to [facility] in [place], [place].    Sources:    -patient interview  -outpatient pharmacy dispense history  -Care Everywhere medication lists  -OARRS    Below are additional concerns with the patient's PTA list: none      Bryan eVlez, PharmD  Transitions of Care Pharmacist  [facility]  Please reach out via Secure Chat for questions, or if no response call [employer] or [facility]

## 2024-02-07 NOTE — BRIEF OP NOTE
Date: 2024  OR Location: [facility] OR    Name: Iris Barth, : 1980, Age: 43 y.o., MRN: [id number], Sex: female    Diagnosis  Pre-op Diagnosis     * Infection of right breast [N61.0] Post-op Diagnosis     * Infection of right breast [N61.0]     Procedures  R breast debridement  Tissue expander removal  Local tissue rearrangement and complex closure  Application of Yolie wound vac    Surgeons      * Ayah Edgar - Primary    Resident/Fellow/Other Assistant:  Surgeon(s) and Role: Marium Lilly PA-C    Procedure Summary  Anesthesia: General  ASA: III  Anesthesia Staff: Anesthesiologist: Jaylene Xie MD  CRNA: CARMELITA Mensah-JASON  Estimated Blood Loss: 15mL  Intra-op Medications:   Administrations occurring from 0815 to 1140 on 24:   Medication Name Total Dose   BUPivacaine-EPINEPHrine (Marcaine w/EPI) 0.5 %-1:200,000 injection 45 mL   hydrogen peroxide 3 % external solution 1 Application   nitroglycerin (Marciano-Bid) 2 % ointment 1 inch          Anesthesia Record               Intraprocedure I/O Totals          Intake    Neostigmine 0.00 mL    The total shown is the total volume documented since Anesthesia Start was filed.    Propofol Drip 0.00 mL    The total shown is the total volume documented since Anesthesia Start was filed.    LR infusion 2000.00 mL    Total Intake 2000 mL       Output    Est. Blood Loss 50 mL    Total Output 50 mL       Net    Net Volume 1950 mL          Specimen:   ID Type Source Tests Collected by Time   1 : Right Breast Tissue Expander for Gross Tissue BREAST IMPLANT RIGHT SURGICAL PATHOLOGY EXAM Ayah Edgar MD 2024 0919   A : Right breast capsule for culture for gram stain, aerobic, anaerobic and fungal Tissue SOFT TISSUE BIOPSY AFB CULTURE/SMEAR, TISSUE/WOUND CULTURE/SMEAR Ayah Edgar MD 2024 0907   B : Right breast tissue culture for gram stain, aerobic, anaerobic and fungal Tissue SOFT TISSUE BIOPSY AFB CULTURE/SMEAR, TISSUE/WOUND CULTURE/SMEAR Ayah  LISBETH Edgar MD 2/7/2024 0927      Staff:   Circulator: Irasema Woodward RN  Relief Circulator: Nixon Abarca RN  Scrub Person: Minal Back; Toña Grijalvacolt    Findings:  Large area of full thickness tissue necrosis of the inferior medial quadrant of the breast with exposed tissue expander. White drainage expressible from the breast. S/p debridement wound bed pink, healthy appearing.     Complications:  None; patient tolerated the procedure well.     Disposition: PACU - hemodynamically stable.  Condition: stable  Specimens Collected:   ID Type Source Tests Collected by Time   1 : Right Breast Tissue Expander for Gross Tissue BREAST IMPLANT RIGHT SURGICAL PATHOLOGY EXAM Ayah Edgar MD 2/7/2024 0987   A : Right breast capsule for culture for gram stain, aerobic, anaerobic and fungal Tissue SOFT TISSUE BIOPSY AFB CULTURE/SMEAR, TISSUE/WOUND CULTURE/SMEAR Ayah Edgar MD 2/7/2024 0982   B : Right breast tissue culture for gram stain, aerobic, anaerobic and fungal Tissue SOFT TISSUE BIOPSY AFB CULTURE/SMEAR, TISSUE/WOUND CULTURE/SMEAR Ayah Edgar MD 2/7/2024 0927     A qualified resident physician was not available.    Marium Lilly PA-C  Plastic and Reconstructive Surgery  Epic chat, Pager [phone], Team phones: [phone]

## 2024-02-07 NOTE — OP NOTE
Removal Tissue expander Breast, debridement skin and soft tissue, adjacent tissue rearrangement 420 cm2 (R), Wound Vac Application / Change (R) Operative Note     Date: 2024  OR Location: [facility] OR    Name: Iris Barth, : 1980, Age: 43 y.o., MRN: [id number], Sex: female    Diagnosis  Pre-op Diagnosis     * Infection of right breast [N61.0] Post-op Diagnosis     * Infection of right breast [N61.0]     Procedures  1) removal right breast tissue expander without insertion of prosthesis 53142  2) Sharp excision of necrotic skin and subcutaneous tissue 7 cm x 5 cm 52525, 70160  3) Adjacent tissue rearrangement 21 cm x 20 cm = 420 cm2  24622, 14302 x 12    Surgeons      * Ayah Edgar - Primary    Resident/Fellow/Other Assistant:  Surgeon(s) and Role: Nima Lilly PA-C  There was no skilled resident help available.    Procedure Summary  Anesthesia: General  ASA: III  Anesthesia Staff: Anesthesiologist: Jaylene Xie MD  CRNA: CARMELITA Mensah-JASON  Estimated Blood Loss: 50 mL  Intra-op Medications:   Administrations occurring from 0815 to 1140 on 24:   Medication Name Total Dose   BUPivacaine-EPINEPHrine (Marcaine w/EPI) 0.5 %-1:200,000 injection 45 mL   hydrogen peroxide 3 % external solution 1 Application   nitroglycerin (Marciano-Bid) 2 % ointment 1 inch              Anesthesia Record               Intraprocedure I/O Totals          Intake    Neostigmine 0.00 mL    The total shown is the total volume documented since Anesthesia Start was filed.    Propofol Drip 0.00 mL    The total shown is the total volume documented since Anesthesia Start was filed.    LR infusion 2000.00 mL    Total Intake 2000 mL       Output    Est. Blood Loss 50 mL    Total Output 50 mL       Net    Net Volume 1950 mL          Specimen:   ID Type Source Tests Collected by Time   1 : Right Breast Tissue Expander for Gross Tissue BREAST IMPLANT RIGHT SURGICAL PATHOLOGY EXAM Ayah Edgar MD 2024 0930   A : Right breast  capsule for culture for gram stain, aerobic, anaerobic and fungal Tissue SOFT TISSUE BIOPSY AFB CULTURE/SMEAR, TISSUE/WOUND CULTURE/SMEAR Ayah Edgar MD 2/7/2024 0982   B : Right breast tissue culture for gram stain, aerobic, anaerobic and fungal Tissue SOFT TISSUE BIOPSY AFB CULTURE/SMEAR, TISSUE/WOUND CULTURE/SMEAR Ayah Edgar MD 2/7/2024 0971        Staff:   Circulator: Irasema Woodward RN  Relief Circulator: Nixon Abarca RN  Scrub Person: Minal Back; Toña Dwyer      Indications: Iris Barth is an 43 y.o. female who is having surgery for Infection of right breast [N61.0].  She has a history of left breast cancer and is status post bilateral mastectomy on January 8, 2024.  She had immediate bilateral prepectoral reconstruction with tissue expanders and biologic mesh.  On table her mastectomy skin flaps were long and had areas of thinness.  Postoperatively she developed mastectomy skin necrosis on the right breast in the inferior medial quadrant.  This was followed with local wound care to allow the tissues to demarcate with the hope that the area involved was only partial-thickness.  It became evident in clinic on 2/6/2024, that she developed full-thickness necrosis and her incision was dehiscing creating drainage and exposing her tissue expander and acellular dermal matrix.  Additionally, the necrotic tissue was malodorous.  She was admitted for IV antibiotics and wound care and taken to the OR the next morning for debridement, removal of expander and adjacent tissue rearrangement for closure.    The patient was seen in the preoperative area. The risks, benefits, complications, treatment options, non-operative alternatives, expected recovery and outcomes were discussed with the patient. The possibilities of reaction to medication, pulmonary aspiration, injury to surrounding structures, bleeding, recurrent infection, the need for additional procedures, failure to diagnose a condition, and  creating a complication requiring transfusion or operation were discussed with the patient. The patient concurred with the proposed plan, giving informed consent.  The site of surgery was properly noted/marked if necessary per policy. The patient has been actively warmed in preoperative area. Preoperative antibiotics have been ordered and given within 1 hours of incision. Venous thrombosis prophylaxis have been ordered including bilateral sequential compression devices    Procedure Details: The patient was identified and marked in the upright and standing position.  She was taken to the operative room and placed in the supine position.  A preoperative time was performed identifying the patient, procedeure and laterality.  An atraumatic endotracheal intubation was performed by the anesthesia team.  Her arms were padded and carefully secured to the arm boards, abducted less than 90 degrees. She was prepped and draped in the usual sterile fashion.    The area of necrosis measured 7 cm in diameter by 5 cm tall.  A 10 blade was used to sharply excise this area which included the skin dermis and subcutaneous tissue.  This was full-thickness and the tissue expander and biologic mesh were exposed.  There was little integration of the inferior pole AlloDerm.  The lateral mastectomy skin flap was raised off of the tissue expander and the acellular dermal matrix with finger dissection.  The superior pole and posterior tissue expander were covered with Gala flex mesh.  This had better integration.  Metzenbaum scissors were used to dissect the mesh off the pectoralis and the superior mastectomy skin flap.  The entire capsule and tissue expander were removed en bloc.  A portion of the posterior mesh and inferior Alloderm was excised and sent as culture.  The soft tissue layer of the necrotic eschar was sharply removed and sent as culture.  The tissue expander was sent to pathology as gross.    After debridement, the wound  measured 16 cm in diameter and 15 cm tall.  It was copiously irrigated with 7 L of normal saline pulse lavage, a liter of dilute hydrogen peroxide, and 2 bottles of Irrisept.  Hemostasis was obtained with electrocautery.  In order to achieve closure I raised the skin and subcutaneous tissues off the anterior fascia of the rectus muscle inferiorly by 5 cm.   I dissected the lateral skin flap of further 5 cm.  Care was taken to leave a intercostal  intact for perfusion. I sharply incised the lateral aspect of her inframammary fold (7 cm) and carried the dissection through full-thickness to better advance the lateral skin flap.  I further created a 6 cm curvilinear backcut to have better advancement.  I raised the medial flap off the chest wall by 1 cm.  The wound, including the area dissected for closure, now measured 21 cm in diameter by 20 cm in height.   45 cc of 0.5% Marcaine with epinephrine was diluted with 55 cc of normal saline, which was used to on the  left side to place a chest wall block, including a pectoral block, intercostal block, field block, and drain sites, for post operative pain control.  I then advanced the superior breast skin inferiorly to the inframammary fold closing with 3-0 Monocryl interrupted buried dermals.  She had a residual island of inferior mastectomy tissue on the medial half of her reconstruction that was approximately 5 cm x 3 cm.  When I attempted to advance this medially or superiorly to aid in closure, there was venous hypertension.  Therefore I de-epithelialized this island and buried under the lateral and superior skin flaps.  I placed 2 19 Yi Seamus drains.  I advanced the lateral flap inferiorly and anteriorly securing it to the lateral chest wall with progressive tension sutures using interrupted 0 Vicryl.  I advanced the inferior flap superiorly securing it with progressive tension sutures using interrupted 0 Vicryl.  In order to get a little bit more  anterior advancement to get closure without tension, I made a backcut at the base of the lateral flap creating a partial Z-plasty.  By advancing the inferior flap superiorly the lateral flap anteriorly and the superior flap inferiorly, the open wound was closed primarily.  In the medial portion the superior flap was inset in such a manner that created a Y.  The incisions were closed with 3-0 Monocryl interrupted buried dermals.  The lateral aspect of the incision was then closed with 4-0 Monocryl subcuticular.  The anterior aspect of closure was completed with interrupted 4-0 nylons.  The island in between the Y had some venous bleeding, therefore I treated this area with Nitropaste.  A Prevena Yolie incisional VAC dressing was placed.  The drains were dressed with CHG Tegaderm.  The patient tolerated the procedure well and she was extubated taken to the recovery room in good condition.    Complications:  None; patient tolerated the procedure well.    Disposition: PACU - hemodynamically stable.  Condition: stable       Attending Attestation: I performed the procedure.    Ayah Edgar  Phone Number: [phone]

## 2024-02-07 NOTE — ANESTHESIA PREPROCEDURE EVALUATION
Patient: Iris Barth    Procedure Information       Date/Time: 02/07/24 0815    Procedures:       Removal Implant Breast, post lavage (Right)      Wound Vac Application / Change (Right)    Location: [facility] OR 22 / [facility] OR    Surgeons: Ayah Edgar MD            Relevant Problems   Anesthesia   (+) PONV (postoperative nausea and vomiting)      Cardiovascular (within normal limits)      Endocrine (within normal limits)      GI   (+) Gastroesophageal reflux disease      /Renal (within normal limits)      Neuro/Psych   (+) Anxiety      Pulmonary (within normal limits)      GI/Hepatic (within normal limits)      Hematology  DVT in 2018 so knee surgery, not on any anticoagulatin       Musculoskeletal   (+) Chondromalacia of patella      Infectious Disease  Now presenting with infection sp breast implants and mastectomy x2 weeks ago    (+) Infection of right breast   (+) Surgical site infection       Clinical information reviewed:    Allergies  Meds               NPO Detail:  NPO/Void Status  Carbohydrate Drink Given Prior to Surgery? : N  Date of Last Liquid: 02/06/24  Time of Last Liquid: 2300  Date of Last Solid: 02/06/24  Time of Last Solid: 2300  Last Intake Type: Clear fluids  Time of Last Void: 0739         Physical Exam    Airway  Mallampati: I  TM distance: >3 FB  Neck ROM: full     Cardiovascular   Rhythm: regular  Rate: normal     Dental    Pulmonary - normal exam     Abdominal - normal exam         Anesthesia Plan    History of general anesthesia?: yes  History of complications of general anesthesia?: yes    ASA 3     general     The patient is not a current smoker.  Patient was not previously instructed to abstain from smoking on day of procedure.  Patient did not smoke on day of procedure.    intravenous induction   Anesthetic plan and risks discussed with patient.  Use of blood products discussed with patient who consented to blood products.    Plan discussed with attending.

## 2024-02-07 NOTE — ANESTHESIA PROCEDURE NOTES
Airway  Date/Time: 2/7/2024 8:45 AM  Urgency: elective    Airway not difficult    Staffing  Performed: CRNA   Authorized by: Jaylene Xie MD    Performed by: CARMELITA Mensah-JASON  Patient location during procedure: OR    Indications and Patient Condition  Indications for airway management: anesthesia  Spontaneous Ventilation: absent  Sedation level: deep  Preoxygenated: yes  Patient position: sniffing  Mask difficulty assessment: 1 - vent by mask    Final Airway Details  Final airway type: endotracheal airway      Successful airway: ETT  Cuffed: yes   Successful intubation technique: direct laryngoscopy  Endotracheal tube insertion site: oral  Blade: Nick  Blade size: #3  ETT size (mm): 7.0  Cormack-Lehane Classification: grade I - full view of glottis  Placement verified by: chest auscultation and capnometry   Measured from: teeth  ETT to teeth (cm): 22  Number of attempts at approach: 1  Ventilation between attempts: BVM  Number of other approaches attempted: 0

## 2024-02-08 VITALS
DIASTOLIC BLOOD PRESSURE: 79 MMHG | SYSTOLIC BLOOD PRESSURE: 124 MMHG | WEIGHT: 167.99 LBS | HEART RATE: 77 BPM | RESPIRATION RATE: 14 BRPM | HEIGHT: 66 IN | OXYGEN SATURATION: 98 % | TEMPERATURE: 98.6 F | BODY MASS INDEX: 27 KG/M2

## 2024-02-08 PROCEDURE — 2500000001 HC RX 250 WO HCPCS SELF ADMINISTERED DRUGS (ALT 637 FOR MEDICARE OP)

## 2024-02-08 PROCEDURE — 2500000004 HC RX 250 GENERAL PHARMACY W/ HCPCS (ALT 636 FOR OP/ED)

## 2024-02-08 PROCEDURE — 99231 SBSQ HOSP IP/OBS SF/LOW 25: CPT

## 2024-02-08 RX ORDER — TRAMADOL HYDROCHLORIDE 50 MG/1
50 TABLET ORAL ONCE
Status: COMPLETED | OUTPATIENT
Start: 2024-02-08 | End: 2024-02-08

## 2024-02-08 RX ADMIN — PANTOPRAZOLE SODIUM 40 MG: 40 TABLET, DELAYED RELEASE ORAL at 06:35

## 2024-02-08 RX ADMIN — CEFAZOLIN SODIUM 2 G: 2 INJECTION, SOLUTION INTRAVENOUS at 02:16

## 2024-02-08 RX ADMIN — TRAMADOL HYDROCHLORIDE 50 MG: 50 TABLET, COATED ORAL at 10:48

## 2024-02-08 RX ADMIN — ACETAMINOPHEN 975 MG: 325 TABLET ORAL at 02:16

## 2024-02-08 RX ADMIN — CEFAZOLIN SODIUM 2 G: 2 INJECTION, SOLUTION INTRAVENOUS at 08:48

## 2024-02-08 RX ADMIN — GABAPENTIN 300 MG: 300 CAPSULE ORAL at 08:47

## 2024-02-08 RX ADMIN — CELECOXIB 200 MG: 200 CAPSULE ORAL at 09:00

## 2024-02-08 RX ADMIN — DOCUSATE SODIUM 100 MG: 100 CAPSULE, LIQUID FILLED ORAL at 08:47

## 2024-02-08 RX ADMIN — ACETAMINOPHEN 975 MG: 325 TABLET ORAL at 10:00

## 2024-02-08 ASSESSMENT — COGNITIVE AND FUNCTIONAL STATUS - GENERAL
MOBILITY SCORE: 24
DAILY ACTIVITIY SCORE: 24

## 2024-02-08 ASSESSMENT — PAIN - FUNCTIONAL ASSESSMENT
PAIN_FUNCTIONAL_ASSESSMENT: 0-10

## 2024-02-08 ASSESSMENT — PAIN SCALES - GENERAL
PAINLEVEL_OUTOF10: 3
PAINLEVEL_OUTOF10: 4
PAINLEVEL_OUTOF10: 5 - MODERATE PAIN

## 2024-02-08 ASSESSMENT — PAIN DESCRIPTION - LOCATION: LOCATION: CHEST

## 2024-02-08 NOTE — NURSING NOTE
Iris Barth discharged at 11:32 AM  and 02/08/24   Patient discharged home via private car .  Discharge education and teaching completed with Iris Barth and patients . Drain care supplies provided, education complete  RN signature: Monica AMBROSIO, RN, CMSRN

## 2024-02-08 NOTE — DISCHARGE SUMMARY
Discharge Diagnosis  Surgical site infection    Test Results Pending At Discharge  Pending Labs       Order Current Status    AFB Culture/Smear Collected (02/07/24 0924)    AFB Culture/Smear Collected (02/07/24 0927)    Surgical Pathology Exam In process    Tissue/Wound Culture/Smear Preliminary result    Tissue/Wound Culture/Smear Preliminary result            Hospital Course  Iris Barth is a 43 y.o. female with PMHx of multicentric left breast cancer, clinical stage 1A, DVT s/p knee injury, s/p bilateral skin sparing mastectomies with placement of tissue expanders on 01/08/24. She presented to clinic (2/6) with worsening tissue necrosis of the inferior medial quadrant of the right breast with exposed tissue expander. D/t to concern for exposed implant, pt directly admitted to [facility] for IV antibiotics and is s/p explant removal and skin closure on 2/7 with Dr. Edgar. Patient did well post-operatively. She was admitted OVN for observation.     On day of discharge, vitals signs were stable. The patient was tolerating their diet, pain was controlled on PO pain medication, and they were ambulating and voiding spontaneously. They were discharged in stable condition with instructions to follow up as outpatient with Dr. Edgar.       Pertinent Physical Exam At Time of Discharge  Physical Exam  Constitutional: NAD  Eyes: EOMI, clear sclera   ENMT: Moist mucous membranes, no apparent injuries or lesions  Head/Neck: NCAT  Cardiovascular: Extremities WWP  Respiratory/Thorax: Unlabored respirations on RA  Gastrointestinal: Abdomen soft, non-distended, non-tender  Genitourinary: Voiding spontaneously   Extremities: MAEx4  Neurological: A&Ox3  Psychological: Anxious  Skin: Warm and dry with no lesions or rashes  Breast: Right: Yolie vac intact maintaining suction with no leaks or alarms. No surrounding erythema, edema.  X2 DIANNA drains with sanguinous output. TTP over wound vac. Limited ROM 2/2 pain.   Left: Incisions healing  covered with steristrips. No surrounding redness, drainage.     Home Medications     Medication List      START taking these medications     amoxicillin-pot clavulanate 875-125 mg tablet; Commonly known as:   Augmentin; Take 1 tablet (875 mg) by mouth every 12 hours for 10 days.   celecoxib 200 mg capsule; Commonly known as: CeleBREX; Take 1 capsule   (200 mg) by mouth every 12 hours for 7 days.   docusate sodium 100 mg capsule; Commonly known as: Colace; Take 1   capsule (100 mg) by mouth 2 times a day for 5 days.   traMADol 50 mg tablet; Commonly known as: Ultram; Take 1 tablet (50 mg)   by mouth every 6 hours if needed for severe pain (7 - 10) for up to 5   days.     CHANGE how you take these medications     acetaminophen 325 mg tablet; Commonly known as: Tylenol; Take 3 tablets   (975 mg) by mouth every 8 hours for 14 days.; What changed: medication   strength, how much to take, when to take this, reasons to take this   ibuprofen 600 mg tablet; Take 1 tablet (600 mg) by mouth every 8 hours   if needed for moderate pain (4 - 6) for up to 14 days.; What changed:   medication strength, how much to take, when to take this, reasons to take   this     CONTINUE taking these medications     cetirizine 10 mg tablet; Commonly known as: ZyrTEC   cholecalciferol 25 MCG (1000 UT) tablet; Commonly known as: Vitamin D-3   diphenhydrAMINE 25 mg tablet; Commonly known as: Sominex   diphenhydrAMINE-acetaminophen  mg per tablet; Commonly known as:   Tylenol PM   fluticasone 50 mcg/actuation nasal spray; Commonly known as: Flonase   gabapentin 300 mg capsule; Commonly known as: Neurontin; Take 1 capsule   (300 mg) by mouth 2 times a day for 14 days.   LORazepam 0.5 mg tablet; Commonly known as: Ativan   multivitamin-children's chewable tablet; Commonly known as: Cerovite Jr   VITAMIN B-12 ORAL   Wegovy 2.4 mg/0.75 mL pen injector; Generic drug: semaglutide (weight   loss); Inject 2.4 mg under the skin every 7 days.   zinc  acetate 50 mg (zinc) capsule       Outpatient Follow-Up  Future Appointments   Date Time Provider Department Center   2/9/2024  3:30 PM Iris Rangel [facility] [id number] [facility]   2/13/2024  1:50 PM James Sebastian MD [id number] [facility]   2/20/2024 11:20 AM Ayah Edgar MD [id number] [facility]   2/20/2024  2:20 PM Ayah Edgar MD [id number] [facility]   2/27/2024 11:20 AM Ayah Edgar MD [id number] [facility]   7/3/2024  9:00 AM CARMELITA Sales-CNP [id number] [facility]       Marium Lilly PA-C

## 2024-02-08 NOTE — CARE PLAN
The clinical goals for the shift include pt will remain free from injury.  Pt is a 44 yo female. VSS AOX4. Pt had her procedure today. Chest has a tight black foam dsg with wound vac and DIANNA drains x 2. Pt ambulates to restroom with one person assist getting in and out of bed. Continue IV Abt. Pt resting quietly in bed with no complaints.    Problem: Skin  Goal: Decreased wound size/increased tissue granulation at next dressing change  Outcome: Progressing  Goal: Participates in plan/prevention/treatment measures  Outcome: Progressing  Goal: Prevent/manage excess moisture  Outcome: Progressing  Goal: Prevent/minimize sheer/friction injuries  Outcome: Progressing  Goal: Promote/optimize nutrition  Outcome: Progressing  Goal: Promote skin healing  Outcome: Progressing   The patient's goals for the shift include

## 2024-02-08 NOTE — CARE PLAN
The patient's goals for the shift include  pain control     The clinical goals for the shift include Patient will remain free from injury    Over the shift, the patient did make progress toward the following goals.   Problem: Anxiety  Goal: STG: Patient will attend at least 80% of scheduled BHM sessions  Outcome: Progressing  Goal: STG: Iris will complete at least 80% of assigned homework   Outcome: Progressing  Goal: STG: Iris will bring completed homework to follow up sessions  Outcome: Progressing  Goal: STG: Iris will identify/communicate/explore challenges/barriers in completing homework  Outcome: Progressing  Goal: STG: Iris will practice recommended interventions outside of counseling sessions  Outcome: Progressing  Goal: STG: Iris will follow through with Treatment Referrals and resources as provided  Outcome: Progressing  Goal: LTG:  Outcome: Progressing     Problem: Skin  Goal: Decreased wound size/increased tissue granulation at next dressing change  Outcome: Progressing  Goal: Participates in plan/prevention/treatment measures  Outcome: Progressing  Goal: Prevent/manage excess moisture  Outcome: Progressing  Goal: Prevent/minimize sheer/friction injuries  Outcome: Progressing  Goal: Promote/optimize nutrition  Outcome: Progressing  Goal: Promote skin healing  Outcome: Progressing     Problem: Pain  Goal: Takes deep breaths with improved pain control throughout the shift  Outcome: Progressing  Goal: Turns in bed with improved pain control throughout the shift  Outcome: Progressing  Goal: Walks with improved pain control throughout the shift  Outcome: Progressing  Goal: Performs ADL's with improved pain control throughout shift  Outcome: Progressing  Goal: Participates in PT with improved pain control throughout the shift  Outcome: Progressing  Goal: Free from opioid side effects throughout the shift  Outcome: Progressing  Goal: Free from acute confusion related to pain meds  throughout the shift  Outcome: Progressing

## 2024-02-09 ENCOUNTER — TELEMEDICINE CLINICAL SUPPORT (OUTPATIENT)
Dept: GENETICS | Facility: HOSPITAL | Age: 44
DRG: 857 | End: 2024-02-09
Payer: COMMERCIAL

## 2024-02-09 ENCOUNTER — LAB REQUISITION (OUTPATIENT)
Dept: LAB | Facility: HOSPITAL | Age: 44
End: 2024-02-09
Payer: COMMERCIAL

## 2024-02-09 DIAGNOSIS — Z80.3 FAMILY HISTORY OF BREAST CANCER: ICD-10-CM

## 2024-02-09 DIAGNOSIS — T81.49XA SURGICAL SITE INFECTION: Primary | ICD-10-CM

## 2024-02-09 DIAGNOSIS — Z71.83 ENCOUNTER FOR NONPROCREATIVE GENETIC COUNSELING: ICD-10-CM

## 2024-02-09 DIAGNOSIS — C50.812 MALIGNANT NEOPLASM OF OVERLAPPING SITES OF LEFT BREAST IN FEMALE, ESTROGEN RECEPTOR POSITIVE (MULTI): ICD-10-CM

## 2024-02-09 DIAGNOSIS — Z80.42 FAMILY HISTORY OF PROSTATE CANCER: ICD-10-CM

## 2024-02-09 DIAGNOSIS — Z17.0 MALIGNANT NEOPLASM OF OVERLAPPING SITES OF LEFT BREAST IN FEMALE, ESTROGEN RECEPTOR POSITIVE (MULTI): ICD-10-CM

## 2024-02-09 PROCEDURE — 98966 PH1 ASSMT&MGMT NQHP 5-10: CPT | Performed by: GENETIC COUNSELOR, MS

## 2024-02-09 RX ORDER — SULFAMETHOXAZOLE AND TRIMETHOPRIM 800; 160 MG/1; MG/1
1 TABLET ORAL 2 TIMES DAILY
Qty: 20 TABLET | Refills: 0 | Status: SHIPPED | OUTPATIENT
Start: 2024-02-09 | End: 2024-02-19

## 2024-02-09 NOTE — PROGRESS NOTES
Genetic counseling follow-up visit note:  Iris Barth is a 43 y.o. female with newly diagnosed breast cancer. She is status post bilateral skin sparing mastectomies.  She has a maternal family history of cancer, including breast cancer in her maternal grandmother and prostate cancer in a maternal uncle.  She was initially seen in the [facility] on 2024 for counseling and coordination of testing. Following that appointment, a blood sample was sent for genetic testing through [facility] via the CancerNext +[facility] panel, which analyzes 36 genes associated with hereditary cancer. Ms. Barth returned for a follow up virtual visit today to discuss her results, and our discussion is summarized below. Her results are available to her via [employer].       Family history:  A 4-generation pedigree was previously obtained and was significant for the following:   - Patient (43), recent breast cancer at age 43  - Maternal uncle (69), prostate cancer at age 67, in remission;  - MGM (d. 93), breast CA at age 54, contralateral breast CA at age 73;  - MGF (d.72), blood or bone cancer, unknown primary origin, dx'd at age 72 and  shortly after;   - Maternal great aunts x 2 (both were MGM's sisters), polycythemia vera at unknown ages;  - PGM (d.93), benign fatty tumors removed from stomach;     Maternal ancestry is [place].  Paternal ancestry is English and Scandinavian. There is no known Ashkenazi Mosque ancestry or consanguinity.       Genetic test results:  36-gene CancerNext +RNAinsight panel through [facility] [facility]. Report date: 2024    Results: NEGATIVE.     No pathogenic mutations, variants of unknown significance, or gross deletions or duplications were detected.    Genes Analyzed (36 total): APC, JOSELYN, BARD1, BMPR1A, BRCA1, BRCA2, BRIP1, CDH1, CDK4, CDKN2A, CHEK2, DICER1, MLH1, MSH2, MSH6, MUTYH, NBN, NF1, NTHL1, PALB2, PMS2, PTEN, RAD51C, RAD51D, RECQL, SMAD4, SMARCA4, STK11 and TP53 (sequencing  and deletion/duplication); AXIN2, HOXB13, MSH3, POLD1 and POLE (sequencing only); EPCAM and GREM1 (deletion/duplication only). RNA data is routinely analyzed for use in variant interpretation for all genes.    Results are summarized at the bottom of this note as well as attached to this encounter.      Genetic counseling:  Ms. Barth's genetic test results were NEGATIVE - no pathogenic (disease-causing) sequence variants or deletions/duplications were identified in the tested genes.    These test results do not explain why Ms. Barth developed breast cancer.     She is s/p bilateral mastectomies, and therefore has decreased her risk for another primary breast cancer by as much as possible.  She should continue to follow the recommendations of her oncology care team regarding treatment of her breast cancer and recommended surveillance.     It was previously discussed that women with BRCA1 and BRCA2 mutations have an increased risk for ovarian cancer. With negative test results and no family history of ovarian cancer, Ms. Barth is not considered to have an increased risk for this cancer type from a genetic standpoint. Prophylactic surgery is not indicated from a genetic standpoint.     Ms. Barth should follow with her PCP for all age-appropriate cancer screenings, such as colonoscopies, PAP tests, etc.    Although her results were negative, Ms. Barth's close female relatives, including her daughter, sisters, mother, and maternal aunts, are considered to have an increased risk to develop breast cancer compared to the general population based on the family history alone. They should speak to their healthcare providers about their breast cancer screening protocols, as they may be a candidate for annual breast MRIs, in addition to an annual mammogram and clinical breast exam. These relatives may also benefit from meeting with a breast specialist to discuss their options.  Her daughter should talk with her healthcare  "provider about these options once she is older.  For her daughter, breast cancer screening should begin around age 34, as this is 10 years earlier than the youngest diagnosis of breast cancer in the family.       Ms. Barth's negative genetic test results decrease the likelihood of an identifiable genetic predisposition to breast cancer in the family.  However, her mother and other maternal relatives are still candidates for genetic testing themselves, as Ms. Barth's maternal grandmother's history of bilateral breast cancer remains unexplained.  There may be a gene mutation in the family that Ms. Barth did not inherit.  If her relatives are local, we would be glad to see them here at .  They can call [phone], option 1, to schedule an appointment.  If they live outside the [facility], their relatives can find a genetics specialist in their area by visiting the National Society for Genetic Counselors website at: <http://www.nsgc.org/> under \"Find a Genetic Counselor,\" with \"Cancer\" specified under special area.     Ms. Barth was not found to have a pathogenic (disease-causing) genetic alteration in any of the tested genes that she could have passed down to her children. Given this, genetic testing is not indicated for her children at this time, unless otherwise indicated based on their paternal family history of cancer.    Our understanding of genetic contribution to cancer risk is always evolving, so there may be additional testing recommended in the future. Ms. Barth is encouraged to contact us in 3-5 years to determine if there have been any changes since our discussion today. Additionally, we encourage Ms. Barth to keep us updated if there are any changes to her personal or family history of cancer.     We remain available to Ms. Barth at [phone] if any questions arise regarding information discussed at today's visit.      Sammie Rangel, MGC, [facility]  Licensed Genetic Counselor  [facility]" Genetics  Ph: [phone]    Reviewed by:  Chrissy Terry MD  Clinical   [facility]    Time spent with patient: 10 minutes

## 2024-02-09 NOTE — PROGRESS NOTES
Cultures taken in OR on 2/7 came back positive for (1+) rare serratia marcescens group. Patient was sent home on Augmentin. Sensitivity showed susceptibility to bactrim and resistance to Augmentin. Discussed with patient we will change her antibiotics from augmentin to a 10 day course of bactrim. Patient expresses understanding. All questions answered. Plan discussed with Dr. Edgar

## 2024-02-10 ENCOUNTER — TELEPHONE (OUTPATIENT)
Dept: PLASTIC SURGERY | Facility: HOSPITAL | Age: 44
End: 2024-02-10
Payer: COMMERCIAL

## 2024-02-10 LAB
BACTERIA SPEC CULT: ABNORMAL
GRAM STN SPEC: ABNORMAL

## 2024-02-10 NOTE — TELEPHONE ENCOUNTER
Patient is S/P removal of tissue expander, debridement of skin and soft tissue, and adjacent tissue rearrangement with vac application on 2/7/24 with Dr. Edgar. Patient is calling today because since surgery, she has had what she describes at tightness as if her skin is pulling, which is making her anxious. Instructed patient that because of the large area of tissue rearrangement that was necessary during surgery, this was a normal feeling. Also discussed this call with Dr. Edgar who agrees. Patient states that relieves  her anxiety and makes her feel better. All questions answered, patient aware to call back with any further questions or concerns.

## 2024-02-13 ENCOUNTER — APPOINTMENT (OUTPATIENT)
Dept: PLASTIC SURGERY | Facility: CLINIC | Age: 44
End: 2024-02-13
Payer: COMMERCIAL

## 2024-02-13 ENCOUNTER — ONCOLOGY MEDICATION OUTREACH (OUTPATIENT)
Dept: HEMATOLOGY/ONCOLOGY | Facility: CLINIC | Age: 44
End: 2024-02-13

## 2024-02-13 ENCOUNTER — OFFICE VISIT (OUTPATIENT)
Dept: HEMATOLOGY/ONCOLOGY | Facility: CLINIC | Age: 44
End: 2024-02-13
Payer: COMMERCIAL

## 2024-02-13 VITALS
DIASTOLIC BLOOD PRESSURE: 88 MMHG | WEIGHT: 167 LBS | HEART RATE: 98 BPM | BODY MASS INDEX: 26.84 KG/M2 | HEIGHT: 66 IN | SYSTOLIC BLOOD PRESSURE: 116 MMHG

## 2024-02-13 DIAGNOSIS — C50.812 MALIGNANT NEOPLASM OF OVERLAPPING SITES OF LEFT BREAST IN FEMALE, ESTROGEN RECEPTOR POSITIVE (MULTI): ICD-10-CM

## 2024-02-13 DIAGNOSIS — Z17.0 MALIGNANT NEOPLASM OF OVERLAPPING SITES OF LEFT BREAST IN FEMALE, ESTROGEN RECEPTOR POSITIVE (MULTI): ICD-10-CM

## 2024-02-13 LAB
LABORATORY COMMENT REPORT: NORMAL
PATH REPORT.FINAL DX SPEC: NORMAL
PATH REPORT.GROSS SPEC: NORMAL
PATH REPORT.RELEVANT HX SPEC: NORMAL
PATH REPORT.TOTAL CANCER: NORMAL

## 2024-02-13 PROCEDURE — 99215 OFFICE O/P EST HI 40 MIN: CPT | Performed by: STUDENT IN AN ORGANIZED HEALTH CARE EDUCATION/TRAINING PROGRAM

## 2024-02-13 PROCEDURE — 3008F BODY MASS INDEX DOCD: CPT | Performed by: STUDENT IN AN ORGANIZED HEALTH CARE EDUCATION/TRAINING PROGRAM

## 2024-02-13 PROCEDURE — 99205 OFFICE O/P NEW HI 60 MIN: CPT | Performed by: STUDENT IN AN ORGANIZED HEALTH CARE EDUCATION/TRAINING PROGRAM

## 2024-02-13 PROCEDURE — 1036F TOBACCO NON-USER: CPT | Performed by: STUDENT IN AN ORGANIZED HEALTH CARE EDUCATION/TRAINING PROGRAM

## 2024-02-13 RX ORDER — TAMOXIFEN CITRATE 20 MG/1
20 TABLET ORAL DAILY
Qty: 90 TABLET | Refills: 1 | Status: SHIPPED | OUTPATIENT
Start: 2024-02-13 | End: 2025-02-12

## 2024-02-13 ASSESSMENT — PAIN SCALES - GENERAL: PAINLEVEL: 0-NO PAIN

## 2024-02-13 NOTE — PROGRESS NOTES
Reviewed drug (tamoxifen), dose, frequency, administration, treatment cycle, duration of therapy, and missed doses. Counseled on potential side effects including but not limited to chemotherapy side effects: Hot flashes  and signs and symptoms of a blood clot. Provided medication/regimen handout. Patient did not have any questions at this time. Contact information was given to patient.

## 2024-02-13 NOTE — PATIENT INSTRUCTIONS
Start tamoxifen daily.  Please have labs drawn at your convenience.   Follow up in 3 months.  Please call [phone] with questions or concerns.

## 2024-02-14 ENCOUNTER — TELEPHONE (OUTPATIENT)
Dept: PLASTIC SURGERY | Facility: CLINIC | Age: 44
End: 2024-02-14
Payer: COMMERCIAL

## 2024-02-15 NOTE — PROGRESS NOTES
Breast Medical Oncology Clinic  Location: [facility]    Visit Type: New Patient Visit    Oncologic History:    10/24/2023: Screening mammogram: Bilateral breast masses, one in the right and two in the left.     11/3/23: Diagnostic breast imaging: Within the right breast, at 12:00 position 6 cm from nipple, an indeterminate mass measuring 0.5 x 0.5 x 0.4 cm is identified. Within the left breast, at 2:00 position 8 cm from nipple, an indeterminate, partially circumscribed parallel hypoechoic mass measuring 0.4 x 0.3 x 0.2 cm is identified. L breast at 11:00 position 7 cm from pole, a suspicious irregular hypoechoic mass measuring 0.9 x 0.4 x 0.4 cm is identified. L breast At 1:00 position 7 cm from  the nipple, a suspicious irregular non parallel shadowing mass is identified, measuring 0.4 x 0.3 x 0.3 cm. Ultrasound examination of the left axilla demonstrates 3 borderline lymph lymph nodes with cortex measuring approximately 0.3 cm in  thickness.    11/10/23: Breast US and mammogram: Suspicious left breast masses at the 11 o'clock and 1 o'clock position without axillary lymphadenopathy. Indeterminate right breast mass without a sonographic correlate. Probably benign left breast mass at the 2 o'clock position which may represent a complicated cyst. A six-month follow-up ultrasound is recommended to document stability.    11/10/23: L breast 1:00 bx: IDC G2, Er >95%, IL> 95%, HER2 equivocal, negative by DELMY; L breast 11:00 Bx: IDC, G2-3 ER 80%, IL 80%, HER2 equivocal, negative by DELMY ; R breast bx: fibroadenomatoid nodule.     1/8/24: Bilateral mastectomy. R breast fibroadenoma. L breast 2 foci of IDC, G2, greatest dimension 11 mm, all margins negative, 5 negative LNs.     2/2/24: Genetic testing: negative      Subjective: History of Present Illness    Patient presents today for [facility] for management of recently diagnosed breast cancer. She is accompanied by .     Oncologic history reviewed above.     She was in her  "usual state of health at the time of diagnosis.     Post-operative period complicated by tissue necrosis and infection.     Experiencing lots of discomfort and tightness in the chest wall. Drains remain in place.     Denies weight loss, new aches or pains aside from surgical related, changes in appetite or energy level. No current concerns at this time.       Gynecologic History:     Age of first menses: 12 years old  ; FLB at age 25  Pre-menopausal  She did breastfeed  Uterus/Ovaries: Intact  OCP: 12 years    Pertinent Family history:    Breast Cancer:  Maternal grandmother  Ovarian Cancer: None  Pancreatic Cancer: None  Other:  Maternal grandfather with bone cancer    Social History  Iris Barth  reports that she has never smoked. She has never been exposed to tobacco smoke. She has never used smokeless tobacco.  She  reports current alcohol use.  She  reports no history of drug use.    ROS:     Review of Systems   All other systems reviewed and are negative.       Physical Examination:    /88 (BP Location: Right arm, Patient Position: Sitting, BP Cuff Size: Adult)   Pulse 98   Ht 1.68 m (5' 6.14\")   Wt 75.8 kg (167 lb)   BMI 26.84 kg/m²     Physical Exam  Vitals and nursing note reviewed.   Constitutional:       General: She is not in acute distress.     Appearance: Normal appearance. She is not toxic-appearing.   HENT:      Head: Normocephalic and atraumatic.   Eyes:      Conjunctiva/sclera: Conjunctivae normal.   Cardiovascular:      Rate and Rhythm: Normal rate.   Pulmonary:      Effort: Pulmonary effort is normal. No respiratory distress.   Abdominal:      General: Abdomen is flat.      Palpations: Abdomen is soft.   Musculoskeletal:         General: No swelling. Normal range of motion.      Cervical back: Normal range of motion.   Skin:     General: Skin is warm and dry.   Neurological:      General: No focal deficit present.      Mental Status: She is alert.   Psychiatric:         Mood " and Affect: Mood normal.         Breast Examination:    s/p bilateral skin sparing mastectomies with placement of tissue expanders. Examination deferred given healing from tissue necrosis.     ECOG Performance Status:     [x] 0 Fully active, able to carry on all pre-disease performance without restriction  [] 1 Restricted in physically strenuous activity but ambulatory and able to carry out work of a light or sedentary nature, e.g., light house work, office work  [] 2 Ambulatory and capable of all selfcare but unable to carry out any work activities; up and about more than 50% of waking hours  [] 3 Capable of only limited selfcare; confined to bed or chair more than 50% of waking hours  [] 4 Completely disabled; cannot carry on any selfcare; totally confined to bed or chair  [] 5 Dead     Results:    Labs:      Lab Results   Component Value Date    WBC 10.4 02/06/2024    HGB 13.3 02/06/2024    HCT 38.7 02/06/2024    MCV 92 02/06/2024     02/06/2024       Chemistry    Lab Results   Component Value Date/Time     02/06/2024 1720    K 3.4 (L) 02/06/2024 1720     02/06/2024 1720    CO2 23 02/06/2024 1720    BUN 19 02/06/2024 1720    CREATININE 0.78 02/06/2024 1720    Lab Results   Component Value Date/Time    CALCIUM 10.0 02/06/2024 1720    ALKPHOS 65 01/04/2024 0908    AST 21 01/04/2024 0908    ALT 23 01/04/2024 0908    BILITOT 0.4 01/04/2024 0908             Imaging:  Reviewed above in Onc History    Pathology:  Reviewed above in Onc History    Assessment:     Pathologic prognostic stage IA (pT1c(m) pN0 cM0) infiltrating ductal carcinoma of the left breast; Dx in 11/2023; Grade 2; ER positive (>95 and 80%), IN positive (>95 and 80%), HER2 negative (equivocal, neg by DELMY); Oncotype DX RS 15 ; S/p bilateral skin sparing mastectomies with placement of tissue expanders, post-op course complicated by tissue necrosis and infection.    Iris Barth is a very pleasant 43 y.o. premenopausal female with  newly diagnosed breast cancer with history of provoked DVT after Knee arthroscopy. We reviewed the events that led to her diagnosis and subsequent procedures that have taken place. We discussed the features of her cancer that determine the approach to treatment including the size, grade, presence/absence of lymphovascular invasion, lymph node status and hormone receptor status (including Her2-nasir). Given these findings, we had the following discussion:      Plan:    Surgical Plan: S/p bilateral skin sparing mastectomies with placement of tissue expanders  Additional biopsy: No further biopsy indicated  Radiation Plan: Not indicated  Additional staging scans/DEXA/echo: Staging scans not indicated based on current stage, patient history and physical examination.  Additional Path info (i.e Ki67, PDL1): Not indicated  Gene assays: Oncotype DX RS 15    Systemic treatment plan: She has a very early stage HR positive cancer with low clinical risk features and low genomic risk. For this reason I recommended no chemotherapy as part of her treatment plan. We discussed the recommendation for hormone blocking therapy in detail including the small benefit in decreasing her risk of cancer recurrence. We reviewed endocrine therapy options in pre-menopausal based on SOFT/TEXT data, including tamoxifen alone, OFS and tamoxifen or OFS and aromatase inhibitor. Discussed the slight difference in iDFS between regimens, the treatment administration schedule and potential toxicities. She does not have any high risk features and tamoxifen alone is reasonable, though I worry about risk of VTE given prior history of provoked DVT. After discussion, patient elected tamoxifen alone. I reviewed the mechanism of action and potential side effects of tamoxifen, including but not limited to hot flashes/vasomotor symptoms, thrombosis (DVT/PE), and uterine malignancy. She understand she needs to see Gyn yearly while on tamoxifen and she should not get  pregnant. Tamoxifen sent to her pharmacy. She may start this once cleared by her breast surgery team, once her post-op risk of VTE has returned to baseline.    Intent: Curative   Clinical trial: Not eligible for our current trials   Endocrine therapy: Tamoxifen   HER2 treatment: not indicated   Targeted agents: not indicated   Chemotherapy: Not indicated   BMA: Not indicated    Access: Not indicated  Supportive meds: No new supportive medications prescribed  Genetic testing: Completed; negative  Fertility preservation: Not indicated; no further child bearing plans  Other active problems/orders:     Provoked DVT is setting of knee arthroscopy: understands risk of VTE with tamoxifen and s/s to watch for and that tamoxifen needs to be stopped perioperatively in the future  We spent a portion of our encounter discussing lifestyle modifications that may help with cancer outcomes and overall wellbeing. We discussed regular exercise aiming for 30 minutes 5 times a week. We discussed diet modifications such as limiting red meat and processed foods. We also discussed limiting alcohol intake.     Surveillance plan: No routine breast imaging indicated    Follow-up: 3 months    Patient expressed understanding of the plan outlined above. She had ample time to ask questions. She understands she can contact us should she have additional questions or issues arise in the interim.

## 2024-02-17 PROBLEM — Z90.13 STATUS POST BILATERAL MASTECTOMY: Status: ACTIVE | Noted: 2024-02-17

## 2024-02-17 PROBLEM — Z98.890 S/P BREAST RECONSTRUCTION: Status: ACTIVE | Noted: 2024-02-17

## 2024-02-19 ENCOUNTER — APPOINTMENT (OUTPATIENT)
Dept: GENETICS | Facility: HOSPITAL | Age: 44
End: 2024-02-19
Payer: COMMERCIAL

## 2024-02-19 NOTE — PROGRESS NOTES
PLASTIC SURGERY CLINIC VISIT  POSTOP BREAST RECONSTRUCTION     Date: 2/20/24   Date of Surgery: 2/7/24  Surgical Procedure: Removal of right tissue expander        HPI:   Iris Barth 43 y.o. female is here for post-operative appointment for the above procedure(s).      Interval changes as of this 2/20/2024: Provena vac removed. Both DIANNA drains removed per protocol; output<10ml for over 10 days.        MEDICATIONS    Current Outpatient Medications:     acetaminophen (Tylenol) 325 mg tablet, Take 3 tablets (975 mg) by mouth every 8 hours for 14 days., Disp: 126 tablet, Rfl: 0    cetirizine (ZyrTEC) 10 mg tablet, Take 1 tablet (10 mg) by mouth once daily as needed for allergies., Disp: , Rfl:     cholecalciferol (Vitamin D-3) 25 MCG (1000 UT) tablet, Take 1 tablet (25 mcg) by mouth once daily., Disp: , Rfl:     cyanocobalamin, vitamin B-12, (VITAMIN B-12 ORAL), Take 1,000 mcg by mouth once daily., Disp: , Rfl:     diphenhydrAMINE (Sominex) 25 mg tablet, Take 1 tablet (25 mg) by mouth if needed for sleep., Disp: , Rfl:     diphenhydrAMINE-acetaminophen (Tylenol PM)  mg per tablet, Take 1 tablet by mouth as needed at bedtime for sleep., Disp: , Rfl:     fluticasone (Flonase) 50 mcg/actuation nasal spray, Administer 1 spray into each nostril once daily as needed for rhinitis or allergies. Shake gently. Before first use, prime pump. After use, clean tip and replace cap., Disp: , Rfl:     gabapentin (Neurontin) 300 mg capsule, Take 1 capsule (300 mg) by mouth 2 times a day for 14 days., Disp: 28 capsule, Rfl: 0    ibuprofen 600 mg tablet, Take 1 tablet (600 mg) by mouth every 8 hours if needed for moderate pain (4 - 6) for up to 14 days., Disp: 42 tablet, Rfl: 0    LORazepam (Ativan) 0.5 mg tablet, Take 1 tablet (0.5 mg) by mouth 3 times a day as needed (anxiety)., Disp: , Rfl:     multivitamin-children's (Cerovite, Jr) chewable tablet, Chew 2 tablets once daily. Flintstones chewable, Disp: , Rfl:      semaglutide, weight loss, (Wegovy) 2.4 mg/0.75 mL pen injector, Inject 2.4 mg under the skin every 7 days., Disp: 9 mL, Rfl: 3    sulfamethoxazole-trimethoprim (Bactrim DS) 800-160 mg tablet, Take 1 tablet by mouth 2 times a day for 10 days., Disp: 20 tablet, Rfl: 0    tamoxifen (Nolvadex) 20 mg tablet, Take 1 tablet (20 mg total) by mouth once daily.  Take with water or any other nonalcoholic drink with or without food at around the same time(s) every day., Disp: 90 tablet, Rfl: 1    zinc acetate 50 mg (zinc) capsule, Take 1 tablet by mouth once daily., Disp: , Rfl:       OBJECTIVE [x]Expand by Default  There were no vitals taken for this visit.     REVIEW OF SYSTEMS:    Constitutional: negative for fevers, chills, unintentional weight loss  HEENT: negative for changes in vision, headaches, changes in hearing, congestion, sore throat  Cardiovascular: negative for chest pain, palpitations  Respiratory: negative for cough, shortness of breath  Gastrointestinal: negative for nausea, vomiting, diarrhea  Genitourinary: negative for dysuria, hematuria  Musculoskeletal: negative for joint swelling or pain  Skin: negative for rashes or lesions  Psych: negative for depression, anxiety  Endocrine: negative for polyuria, polydipsia, cold/heat intolerance  Hem/Lymph: negative for bleeding disorder     PHYSICAL EXAM  General: alert and oriented, no apparent distress    Focused exam of the breasts:  Right: flaps healing well.  No areas of necrosis. No seroma.  Left: Expanded with 50ml     ASSESSMENT/PLAN  Iris Barth 43 y.o. female who had removal of tissue expander (R), debridement of mastectomy skin necrosis,  and Adjacent tissue rearrangment on 2/7 who presents for POV.     DIANNA drain(s) in place. No erythema or edema surrounding the drain site. There is serous output from the drain. Patient recorded output of the drains showed 2 consecutive days of less than 30cc output at time of removal. Patient was educated on  purpose of surgical drains and informed of risk for seroma post drain removal.       DIANNA drain(s) removed at this visit and every other suture removed.     RTC in one  week  Mupiriocin to incision line and drain sites bid     Attending Attestation:  Ayah BOOTH MD, personal performed the history, exam, and decision making on this patient.

## 2024-02-20 ENCOUNTER — APPOINTMENT (OUTPATIENT)
Dept: PLASTIC SURGERY | Facility: CLINIC | Age: 44
End: 2024-02-20
Payer: COMMERCIAL

## 2024-02-20 ENCOUNTER — OFFICE VISIT (OUTPATIENT)
Dept: PLASTIC SURGERY | Facility: CLINIC | Age: 44
End: 2024-02-20
Payer: COMMERCIAL

## 2024-02-20 VITALS
WEIGHT: 166 LBS | HEART RATE: 94 BPM | HEIGHT: 66 IN | DIASTOLIC BLOOD PRESSURE: 78 MMHG | SYSTOLIC BLOOD PRESSURE: 127 MMHG | BODY MASS INDEX: 26.68 KG/M2

## 2024-02-20 DIAGNOSIS — C50.812 MALIGNANT NEOPLASM OF OVERLAPPING SITES OF LEFT BREAST IN FEMALE, ESTROGEN RECEPTOR POSITIVE (MULTI): Primary | ICD-10-CM

## 2024-02-20 DIAGNOSIS — Z17.0 MALIGNANT NEOPLASM OF OVERLAPPING SITES OF LEFT BREAST IN FEMALE, ESTROGEN RECEPTOR POSITIVE (MULTI): Primary | ICD-10-CM

## 2024-02-20 LAB
AP SUMMARY REPORT: NORMAL
SCAN RESULT: NORMAL

## 2024-02-20 PROCEDURE — 3008F BODY MASS INDEX DOCD: CPT | Performed by: SURGERY

## 2024-02-20 PROCEDURE — 99024 POSTOP FOLLOW-UP VISIT: CPT | Performed by: SURGERY

## 2024-02-20 PROCEDURE — 1036F TOBACCO NON-USER: CPT | Performed by: SURGERY

## 2024-02-25 NOTE — PROGRESS NOTES
PLASTIC SURGERY CLINIC VISIT  POSTOP BREAST RECONSTRUCTION     Date: 2/27/24   Date of Surgery: 2/7/24  Surgical Procedure: Removal of right tissue expander        HPI:   Iris Barth 43 y.o. female is here for post-operative appointment for the above procedure(s).           MEDICATIONS    Current Outpatient Medications:     cetirizine (ZyrTEC) 10 mg tablet, Take 1 tablet (10 mg) by mouth once daily as needed for allergies., Disp: , Rfl:     cholecalciferol (Vitamin D-3) 25 MCG (1000 UT) tablet, Take 1 tablet (25 mcg) by mouth once daily., Disp: , Rfl:     cyanocobalamin, vitamin B-12, (VITAMIN B-12 ORAL), Take 1,000 mcg by mouth once daily., Disp: , Rfl:     diphenhydrAMINE (Sominex) 25 mg tablet, Take 1 tablet (25 mg) by mouth if needed for sleep., Disp: , Rfl:     diphenhydrAMINE-acetaminophen (Tylenol PM)  mg per tablet, Take 1 tablet by mouth as needed at bedtime for sleep., Disp: , Rfl:     fluticasone (Flonase) 50 mcg/actuation nasal spray, Administer 1 spray into each nostril once daily as needed for rhinitis or allergies. Shake gently. Before first use, prime pump. After use, clean tip and replace cap., Disp: , Rfl:     gabapentin (Neurontin) 300 mg capsule, Take 1 capsule (300 mg) by mouth 2 times a day for 14 days., Disp: 28 capsule, Rfl: 0    LORazepam (Ativan) 0.5 mg tablet, Take 1 tablet (0.5 mg) by mouth 3 times a day as needed (anxiety)., Disp: , Rfl:     multivitamin-children's (Cerovite, Jr) chewable tablet, Chew 2 tablets once daily. Flintstones chewable, Disp: , Rfl:     semaglutide, weight loss, (Wegovy) 2.4 mg/0.75 mL pen injector, Inject 2.4 mg under the skin every 7 days., Disp: 9 mL, Rfl: 3    tamoxifen (Nolvadex) 20 mg tablet, Take 1 tablet (20 mg total) by mouth once daily.  Take with water or any other nonalcoholic drink with or without food at around the same time(s) every day., Disp: 90 tablet, Rfl: 1    zinc acetate 50 mg (zinc) capsule, Take 1 tablet by mouth once  "daily., Disp: , Rfl:       OBJECTIVE [x]Expand by Default  Height 1.676 m (5' 6\"), weight 75.8 kg (167 lb).       REVIEW OF SYSTEMS:    Constitutional: negative for fevers, chills, unintentional weight loss  HEENT: negative for changes in vision, headaches, changes in hearing, congestion, sore throat  Cardiovascular: negative for chest pain, palpitations  Respiratory: negative for cough, shortness of breath  Gastrointestinal: negative for nausea, vomiting, diarrhea  Genitourinary: negative for dysuria, hematuria  Musculoskeletal: negative for joint swelling or pain  Skin: negative for rashes or lesions  Psych: negative for depression, anxiety  Endocrine: negative for polyuria, polydipsia, cold/heat intolerance  Hem/Lymph: negative for bleeding disorder     PHYSICAL EXAM  General: alert and oriented, no apparent distress    Focused exam of the breasts:  Right: Incisions C/D/I  Left: TE total 535  2/20 Expanded with 50ml  2/27 expanded by    150 ml  Total 200 ml    ASSESSMENT/PLAN  Iris Barth 43 y.o. female who had Right side TE removal on 2/7/24 presents for POV. Incisions C/D/I.   Sutures removed at this visit. Filled expander by 150 ml     Apply Lamisil to right side incision and drain site twice daily   2.   RTC in one week      Attending Attestation:  IAyah MD, personal performed the history, exam, and decision making on this patient.   "

## 2024-02-27 ENCOUNTER — OFFICE VISIT (OUTPATIENT)
Dept: PLASTIC SURGERY | Facility: CLINIC | Age: 44
End: 2024-02-27
Payer: COMMERCIAL

## 2024-02-27 VITALS — HEIGHT: 66 IN | WEIGHT: 167 LBS | BODY MASS INDEX: 26.84 KG/M2

## 2024-02-27 DIAGNOSIS — B36.9 FUNGAL INFECTION OF SKIN: Primary | ICD-10-CM

## 2024-02-27 PROCEDURE — 99024 POSTOP FOLLOW-UP VISIT: CPT | Performed by: SURGERY

## 2024-02-27 PROCEDURE — 1036F TOBACCO NON-USER: CPT | Performed by: SURGERY

## 2024-02-27 PROCEDURE — 3008F BODY MASS INDEX DOCD: CPT | Performed by: SURGERY

## 2024-02-29 RX ORDER — PRENATAL VIT 91/IRON/FOLIC/DHA 28-975-200
COMBINATION PACKAGE (EA) ORAL 2 TIMES DAILY
Qty: 28.4 G | Refills: 1 | Status: SHIPPED | OUTPATIENT
Start: 2024-02-29 | End: 2024-03-14

## 2024-03-04 NOTE — PROGRESS NOTES
PLASTIC SURGERY CLINIC VISIT  POSTOP BREAST RECONSTRUCTION     Date: 3/5/24  Date of Surgery: 2/7/24  Surgical Procedure: Removal of right tissue expander        HPI:   Iris Barth 43 y.o. female is here for post-operative appointment for continued tissue expansion.     She tolerated her previous fill without difficulty. She has been using lamasil daily and redness and greatly improved. She presents today for continued TE expansion. She endorses that she is not sure that she wants her breast to be much larger than it currently is. Her surgical scars on the right breast she endorses are healed.        MEDICATIONS    Current Outpatient Medications:     cetirizine (ZyrTEC) 10 mg tablet, Take 1 tablet (10 mg) by mouth once daily as needed for allergies., Disp: , Rfl:     cholecalciferol (Vitamin D-3) 25 MCG (1000 UT) tablet, Take 1 tablet (25 mcg) by mouth once daily., Disp: , Rfl:     cyanocobalamin, vitamin B-12, (VITAMIN B-12 ORAL), Take 1,000 mcg by mouth once daily., Disp: , Rfl:     diphenhydrAMINE (Sominex) 25 mg tablet, Take 1 tablet (25 mg) by mouth if needed for sleep., Disp: , Rfl:     diphenhydrAMINE-acetaminophen (Tylenol PM)  mg per tablet, Take 1 tablet by mouth as needed at bedtime for sleep., Disp: , Rfl:     fluticasone (Flonase) 50 mcg/actuation nasal spray, Administer 1 spray into each nostril once daily as needed for rhinitis or allergies. Shake gently. Before first use, prime pump. After use, clean tip and replace cap., Disp: , Rfl:     gabapentin (Neurontin) 300 mg capsule, Take 1 capsule (300 mg) by mouth 2 times a day for 14 days., Disp: 28 capsule, Rfl: 0    LORazepam (Ativan) 0.5 mg tablet, Take 1 tablet (0.5 mg) by mouth 3 times a day as needed (anxiety)., Disp: , Rfl:     multivitamin-children's (Cerovite, Jr) chewable tablet, Chew 2 tablets once daily. Flintstones chewable, Disp: , Rfl:     semaglutide, weight loss, (Wegovy) 2.4 mg/0.75 mL pen injector, Inject 2.4 mg under  "the skin every 7 days., Disp: 9 mL, Rfl: 3    tamoxifen (Nolvadex) 20 mg tablet, Take 1 tablet (20 mg total) by mouth once daily.  Take with water or any other nonalcoholic drink with or without food at around the same time(s) every day., Disp: 90 tablet, Rfl: 1    terbinafine (LamISIL AT) 1 % cream, Apply topically 2 times a day for 14 days., Disp: 28.4 g, Rfl: 1    zinc acetate 50 mg (zinc) capsule, Take 1 tablet by mouth once daily., Disp: , Rfl:       OBJECTIVE [x]Expand by Default      2/8/2024     2:16 AM 2/8/2024     5:35 AM 2/8/2024    10:05 AM 2/13/2024     1:54 PM 2/20/2024    11:03 AM 2/27/2024    11:22 AM 3/5/2024    11:55 AM   Vitals   Systolic 105 103 124 116 127  128   Diastolic 66 65 79 88 78  89   Heart Rate 90 83 77 98 94  90   Temp  36.6 °C (97.9 °F) 37 °C (98.6 °F)       Resp 18 18 14       Height (in)    1.68 m (5' 6.14\") 1.676 m (5' 6\") 1.676 m (5' 6\") 1.676 m (5' 6\")   Weight (lb)    167 166 167 160   BMI    26.84 kg/m2 26.79 kg/m2 26.95 kg/m2 25.82 kg/m2   BSA (m2)    1.88 m2 1.87 m2 1.88 m2 1.84 m2   Visit Report    Report Report Report Report       REVIEW OF SYSTEMS:    Constitutional: negative for fevers, chills, unintentional weight loss  HEENT: negative for changes in vision, headaches, changes in hearing, congestion, sore throat  Cardiovascular: negative for chest pain, palpitations  Respiratory: negative for cough, shortness of breath  Gastrointestinal: negative for nausea, vomiting, diarrhea  Genitourinary: negative for dysuria, hematuria  Musculoskeletal: negative for joint swelling or pain  Skin: positive for redness along the surgical scars are the right breast  Psych: negative for depression, anxiety  Endocrine: negative for polyuria, polydipsia, cold/heat intolerance  Hem/Lymph: negative for bleeding disorder     PHYSICAL EXAM  General: alert and oriented, no apparent distress. Aspirated 25 ml; sent for culture    Focused exam of the breasts:  Right: Incisions C/D/I  Left: TE " total 535  2/20 Expanded with 50ml  2/27 expanded by    150 ml  3/5 + 50 ml  Total 250 ml    ASSESSMENT/PLAN  Iris Barth 43 y.o. female who had Right side TE removal on 2/7/24 presents for POV.     Today we performed tissue expansion of 50cc. We aspirated 25cc yellow murky colored fluid from seroma pocket overlying the tissue expander. She tolerated procedure without difficulty.     She will return in 1-2 weeks for continued TE expansion. Will send aspirated seroma for culture. Will follow results.     Erum Alcaraz PA-C  03/05/24

## 2024-03-05 ENCOUNTER — OFFICE VISIT (OUTPATIENT)
Dept: PLASTIC SURGERY | Facility: CLINIC | Age: 44
End: 2024-03-05
Payer: COMMERCIAL

## 2024-03-05 ENCOUNTER — APPOINTMENT (OUTPATIENT)
Dept: LAB | Facility: LAB | Age: 44
End: 2024-03-05
Payer: COMMERCIAL

## 2024-03-05 VITALS
BODY MASS INDEX: 25.71 KG/M2 | DIASTOLIC BLOOD PRESSURE: 89 MMHG | WEIGHT: 160 LBS | SYSTOLIC BLOOD PRESSURE: 128 MMHG | HEART RATE: 90 BPM | HEIGHT: 66 IN

## 2024-03-05 DIAGNOSIS — Z17.0 MALIGNANT NEOPLASM OF OVERLAPPING SITES OF LEFT BREAST IN FEMALE, ESTROGEN RECEPTOR POSITIVE (MULTI): Primary | ICD-10-CM

## 2024-03-05 DIAGNOSIS — N64.89 SEROMA OF BREAST: ICD-10-CM

## 2024-03-05 DIAGNOSIS — C50.812 MALIGNANT NEOPLASM OF OVERLAPPING SITES OF LEFT BREAST IN FEMALE, ESTROGEN RECEPTOR POSITIVE (MULTI): Primary | ICD-10-CM

## 2024-03-05 PROCEDURE — 1036F TOBACCO NON-USER: CPT | Performed by: SURGERY

## 2024-03-05 PROCEDURE — 3008F BODY MASS INDEX DOCD: CPT | Performed by: SURGERY

## 2024-03-05 PROCEDURE — 87070 CULTURE OTHR SPECIMN AEROBIC: CPT

## 2024-03-05 PROCEDURE — 99024 POSTOP FOLLOW-UP VISIT: CPT | Performed by: SURGERY

## 2024-03-05 PROCEDURE — 87205 SMEAR GRAM STAIN: CPT

## 2024-03-05 PROCEDURE — 87075 CULTR BACTERIA EXCEPT BLOOD: CPT

## 2024-03-08 LAB
BACTERIA FLD CULT: NORMAL
GRAM STN SPEC: NORMAL
GRAM STN SPEC: NORMAL

## 2024-03-08 NOTE — PROGRESS NOTES
PLASTIC SURGERY CLINIC VISIT  POSTOP BREAST RECONSTRUCTION     Date: 3/12/24  Date of Surgery: 2/7/24  Surgical Procedure: Removal of right tissue expander        HPI:   Iris Barth 43 y.o. female is here for post-operative appointment for continued tissue expansion.     She tolerated her previous fill without difficulty. She has been using lamasil daily and redness and greatly improved. She presents today for continued TE expansion. She endorses that she is not sure that she wants her breast to be much larger than it currently is. Her surgical scars on the right breast she endorses are healed.        MEDICATIONS    Current Outpatient Medications:     cetirizine (ZyrTEC) 10 mg tablet, Take 1 tablet (10 mg) by mouth once daily as needed for allergies., Disp: , Rfl:     cholecalciferol (Vitamin D-3) 25 MCG (1000 UT) tablet, Take 1 tablet (25 mcg) by mouth once daily., Disp: , Rfl:     cyanocobalamin, vitamin B-12, (VITAMIN B-12 ORAL), Take 1,000 mcg by mouth once daily., Disp: , Rfl:     diphenhydrAMINE (Sominex) 25 mg tablet, Take 1 tablet (25 mg) by mouth if needed for sleep., Disp: , Rfl:     diphenhydrAMINE-acetaminophen (Tylenol PM)  mg per tablet, Take 1 tablet by mouth as needed at bedtime for sleep., Disp: , Rfl:     fluticasone (Flonase) 50 mcg/actuation nasal spray, Administer 1 spray into each nostril once daily as needed for rhinitis or allergies. Shake gently. Before first use, prime pump. After use, clean tip and replace cap., Disp: , Rfl:     gabapentin (Neurontin) 300 mg capsule, Take 1 capsule (300 mg) by mouth 2 times a day for 14 days., Disp: 28 capsule, Rfl: 0    LORazepam (Ativan) 0.5 mg tablet, Take 1 tablet (0.5 mg) by mouth 3 times a day as needed (anxiety)., Disp: , Rfl:     multivitamin-children's (Cerovite, Jr) chewable tablet, Chew 2 tablets once daily. Flintstones chewable, Disp: , Rfl:     semaglutide, weight loss, (Wegovy) 2.4 mg/0.75 mL pen injector, Inject 2.4 mg  "under the skin every 7 days., Disp: 9 mL, Rfl: 3    tamoxifen (Nolvadex) 20 mg tablet, Take 1 tablet (20 mg total) by mouth once daily.  Take with water or any other nonalcoholic drink with or without food at around the same time(s) every day., Disp: 90 tablet, Rfl: 1    terbinafine (LamISIL AT) 1 % cream, Apply topically 2 times a day for 14 days., Disp: 28.4 g, Rfl: 1    zinc acetate 50 mg (zinc) capsule, Take 1 tablet by mouth once daily., Disp: , Rfl:       OBJECTIVE [x]Expand by Default      2/8/2024     2:16 AM 2/8/2024     5:35 AM 2/8/2024    10:05 AM 2/13/2024     1:54 PM 2/20/2024    11:03 AM 2/27/2024    11:22 AM 3/5/2024    11:55 AM   Vitals   Systolic 105 103 124 116 127  128   Diastolic 66 65 79 88 78  89   Heart Rate 90 83 77 98 94  90   Temp  36.6 °C (97.9 °F) 37 °C (98.6 °F)       Resp 18 18 14       Height (in)    1.68 m (5' 6.14\") 1.676 m (5' 6\") 1.676 m (5' 6\") 1.676 m (5' 6\")   Weight (lb)    167 166 167 160   BMI    26.84 kg/m2 26.79 kg/m2 26.95 kg/m2 25.82 kg/m2   BSA (m2)    1.88 m2 1.87 m2 1.88 m2 1.84 m2   Visit Report    Report Report Report Report       REVIEW OF SYSTEMS:    Constitutional: negative for fevers, chills, unintentional weight loss  HEENT: negative for changes in vision, headaches, changes in hearing, congestion, sore throat  Cardiovascular: negative for chest pain, palpitations  Respiratory: negative for cough, shortness of breath  Gastrointestinal: negative for nausea, vomiting, diarrhea  Genitourinary: negative for dysuria, hematuria  Musculoskeletal: negative for joint swelling or pain  Skin: positive for redness along the surgical scars are the right breast  Psych: negative for depression, anxiety  Endocrine: negative for polyuria, polydipsia, cold/heat intolerance  Hem/Lymph: negative for bleeding disorder     PHYSICAL EXAM  General: alert and oriented, no apparent distress.     Focused exam of the breasts:  Right: Incisions C/D/I  Left: TE total 535  2/20 Expanded with " 50ml       2/27 +   150  3/5 + 50 ml   3/12 + 50 ml  Total 300 ml    20 ml cloudy aspirate removed from seroma     ASSESSMENT/PLAN  Iris Barth 43 y.o. female who had Right side TE removal on 2/7/24 presents for POV. Cultures from last week are negative.  Seroma sent for cultures again today.    -Aspirate sent for culture   -Continue with scar massage  - Return to clinic in one week  -Okay to wear supportive sport bra without under wire      She will return in 1 week for continued TE expansion.     Attending Attestation:  Ayah BOOTH MD, personal performed the history, exam, and decision making on this patient.

## 2024-03-12 ENCOUNTER — OFFICE VISIT (OUTPATIENT)
Dept: PLASTIC SURGERY | Facility: CLINIC | Age: 44
End: 2024-03-12
Payer: COMMERCIAL

## 2024-03-12 VITALS — WEIGHT: 160 LBS | BODY MASS INDEX: 25.71 KG/M2 | HEIGHT: 66 IN

## 2024-03-12 DIAGNOSIS — C50.812 MALIGNANT NEOPLASM OF OVERLAPPING SITES OF LEFT BREAST IN FEMALE, ESTROGEN RECEPTOR POSITIVE (MULTI): ICD-10-CM

## 2024-03-12 DIAGNOSIS — Z17.0 MALIGNANT NEOPLASM OF OVERLAPPING SITES OF LEFT BREAST IN FEMALE, ESTROGEN RECEPTOR POSITIVE (MULTI): ICD-10-CM

## 2024-03-12 PROCEDURE — 3008F BODY MASS INDEX DOCD: CPT | Performed by: SURGERY

## 2024-03-12 PROCEDURE — 87205 SMEAR GRAM STAIN: CPT

## 2024-03-12 PROCEDURE — 1036F TOBACCO NON-USER: CPT | Performed by: SURGERY

## 2024-03-12 PROCEDURE — 99024 POSTOP FOLLOW-UP VISIT: CPT | Performed by: SURGERY

## 2024-03-12 PROCEDURE — 87070 CULTURE OTHR SPECIMN AEROBIC: CPT

## 2024-03-12 PROCEDURE — 87075 CULTR BACTERIA EXCEPT BLOOD: CPT

## 2024-03-15 LAB
BACTERIA FLD CULT: NORMAL
GRAM STN SPEC: NORMAL
GRAM STN SPEC: NORMAL

## 2024-03-16 NOTE — PROGRESS NOTES
PLASTIC SURGERY CLINIC VISIT  POSTOP BREAST RECONSTRUCTION     Date: 3/19/24  Date of Surgery: 2/7/24  Surgical Procedure: Removal of right tissue expander        HPI:   Iris Barth 43 y.o. female is here for post-operative appointment for continued tissue expansion.     Interval changes as of this date:  3/12 She tolerated her previous fill without difficulty. She has been using lamasil daily and redness and greatly improved. She presents today for continued TE expansion. She endorses that she is not sure that she wants her breast to be much larger than it currently is. Her surgical scars on the right breast she endorses are healed.   3/19 No changes at this time. At volume on the left that she likes       MEDICATIONS    Current Outpatient Medications:     cetirizine (ZyrTEC) 10 mg tablet, Take 1 tablet (10 mg) by mouth once daily as needed for allergies., Disp: , Rfl:     cholecalciferol (Vitamin D-3) 25 MCG (1000 UT) tablet, Take 1 tablet (25 mcg) by mouth once daily., Disp: , Rfl:     cyanocobalamin, vitamin B-12, (VITAMIN B-12 ORAL), Take 1,000 mcg by mouth once daily., Disp: , Rfl:     diphenhydrAMINE (Sominex) 25 mg tablet, Take 1 tablet (25 mg) by mouth if needed for sleep., Disp: , Rfl:     diphenhydrAMINE-acetaminophen (Tylenol PM)  mg per tablet, Take 1 tablet by mouth as needed at bedtime for sleep., Disp: , Rfl:     fluticasone (Flonase) 50 mcg/actuation nasal spray, Administer 1 spray into each nostril once daily as needed for rhinitis or allergies. Shake gently. Before first use, prime pump. After use, clean tip and replace cap., Disp: , Rfl:     gabapentin (Neurontin) 300 mg capsule, Take 1 capsule (300 mg) by mouth 2 times a day for 14 days., Disp: 28 capsule, Rfl: 0    LORazepam (Ativan) 0.5 mg tablet, Take 2 tablets (1 mg) by mouth 3 times a day as needed (anxiety)., Disp: , Rfl:     multivitamin-children's (Cerovite, Jr) chewable tablet, Chew 2 tablets once daily. Flintstones  "chewable, Disp: , Rfl:     semaglutide, weight loss, (Wegovy) 2.4 mg/0.75 mL pen injector, Inject 2.4 mg under the skin every 7 days., Disp: 9 mL, Rfl: 3    tamoxifen (Nolvadex) 20 mg tablet, Take 1 tablet (20 mg total) by mouth once daily.  Take with water or any other nonalcoholic drink with or without food at around the same time(s) every day., Disp: 90 tablet, Rfl: 1    zinc acetate 50 mg (zinc) capsule, Take 1 tablet by mouth once daily., Disp: , Rfl:       OBJECTIVE [x]Expand by Default      2/8/2024     5:35 AM 2/8/2024    10:05 AM 2/13/2024     1:54 PM 2/20/2024    11:03 AM 2/27/2024    11:22 AM 3/5/2024    11:55 AM 3/12/2024    11:32 AM   Vitals   Systolic 103 124 116 127  128    Diastolic 65 79 88 78  89    Heart Rate 83 77 98 94  90    Temp 36.6 °C (97.9 °F) 37 °C (98.6 °F)        Resp 18 14        Height (in)   1.68 m (5' 6.14\") 1.676 m (5' 6\") 1.676 m (5' 6\") 1.676 m (5' 6\") 1.676 m (5' 6\")   Weight (lb)   167 166 167 160 160   BMI   26.84 kg/m2 26.79 kg/m2 26.95 kg/m2 25.82 kg/m2 25.82 kg/m2   BSA (m2)   1.88 m2 1.87 m2 1.88 m2 1.84 m2 1.84 m2   Visit Report   Report Report Report Report Report       REVIEW OF SYSTEMS:    Constitutional: negative for fevers, chills, unintentional weight loss  HEENT: negative for changes in vision, headaches, changes in hearing, congestion, sore throat  Cardiovascular: negative for chest pain, palpitations  Respiratory: negative for cough, shortness of breath  Gastrointestinal: negative for nausea, vomiting, diarrhea  Genitourinary: negative for dysuria, hematuria  Musculoskeletal: negative for joint swelling or pain  Skin: positive for redness along the surgical scars are the right breast  Psych: negative for depression, anxiety  Endocrine: negative for polyuria, polydipsia, cold/heat intolerance  Hem/Lymph: negative for bleeding disorder     PHYSICAL EXAM  General: alert and oriented, no apparent distress.     Focused exam of the breasts:  Right: Incisions " C/D/I  Left: TE total 535  2/20 Expanded with 50ml       2/27 +   150  3/5 + 50 ml   3/12 + 50 ml (20 ml cloudy aspirate removed from seroma)  Total 300 ml  3/19/24 no expansion today       ASSESSMENT/PLAN  Iris Barth 43 y.o. female who had Right side TE removal on 2/7/24 presents for POV.     No expansion today  Continue with scar massage   Continue with supportive undergarment     RTC on 4/16    Attending Attestation:  I, Ayah Edgar MD, personal performed the history, exam, and decision making on this patient.

## 2024-03-19 ENCOUNTER — OFFICE VISIT (OUTPATIENT)
Dept: PLASTIC SURGERY | Facility: CLINIC | Age: 44
End: 2024-03-19
Payer: COMMERCIAL

## 2024-03-19 VITALS
SYSTOLIC BLOOD PRESSURE: 123 MMHG | HEART RATE: 87 BPM | DIASTOLIC BLOOD PRESSURE: 88 MMHG | WEIGHT: 166 LBS | HEIGHT: 66 IN | BODY MASS INDEX: 26.68 KG/M2

## 2024-03-19 DIAGNOSIS — Z17.0 MALIGNANT NEOPLASM OF OVERLAPPING SITES OF LEFT BREAST IN FEMALE, ESTROGEN RECEPTOR POSITIVE (MULTI): Primary | ICD-10-CM

## 2024-03-19 DIAGNOSIS — C50.812 MALIGNANT NEOPLASM OF OVERLAPPING SITES OF LEFT BREAST IN FEMALE, ESTROGEN RECEPTOR POSITIVE (MULTI): Primary | ICD-10-CM

## 2024-03-19 PROCEDURE — 1036F TOBACCO NON-USER: CPT | Performed by: SURGERY

## 2024-03-19 PROCEDURE — 99024 POSTOP FOLLOW-UP VISIT: CPT | Performed by: SURGERY

## 2024-03-19 PROCEDURE — 3008F BODY MASS INDEX DOCD: CPT | Performed by: SURGERY

## 2024-03-25 ENCOUNTER — TELEPHONE (OUTPATIENT)
Dept: HEMATOLOGY/ONCOLOGY | Facility: CLINIC | Age: 44
End: 2024-03-25

## 2024-03-26 ENCOUNTER — APPOINTMENT (OUTPATIENT)
Dept: PLASTIC SURGERY | Facility: CLINIC | Age: 44
End: 2024-03-26
Payer: COMMERCIAL

## 2024-03-27 NOTE — TELEPHONE ENCOUNTER
Hello, I am back in the office and wanted to see if this was addressed? If not, I can call the patient later today. Thanks.

## 2024-03-27 NOTE — TELEPHONE ENCOUNTER
I called and spoke with patient. She reports new vaginal dryness since starting tamoxifen. Slight itching and discomfort after intercourse. Denies odor or discharge. Requesting cream/topical to use. Informed patient I would discuss with you and call her back tomorrow, 3/28/24. Thanks

## 2024-04-02 LAB
ACID FAST STN SPEC: NORMAL
ACID FAST STN SPEC: NORMAL
MYCOBACTERIUM SPEC CULT: NORMAL
MYCOBACTERIUM SPEC CULT: NORMAL

## 2024-04-15 NOTE — PROGRESS NOTES
PLASTIC SURGERY CLINIC VISIT  POSTOP BREAST RECONSTRUCTION     Date:4/16/24  Date of Surgery: 2/7/24  Surgical Procedure: Removal of right tissue expander        HPI:   Iris Barth 43 y.o. female is here for post-operative appointment for continued tissue expansion.     Interval changes as of this date:  3/12 She tolerated her previous fill without difficulty. She has been using lamasil daily and redness and greatly improved. She presents today for continued TE expansion. She endorses that she is not sure that she wants her breast to be much larger than it currently is. Her surgical scars on the right breast she endorses are healed.   3/19 No changes at this time. At volume on the left that she likes  4/16 Doing well overall; no changes at this time. Interested in further surgery for symmetry.      MEDICATIONS    Current Outpatient Medications:     cetirizine (ZyrTEC) 10 mg tablet, Take 1 tablet (10 mg) by mouth once daily as needed for allergies., Disp: , Rfl:     cholecalciferol (Vitamin D-3) 25 MCG (1000 UT) tablet, Take 1 tablet (25 mcg) by mouth once daily., Disp: , Rfl:     cyanocobalamin, vitamin B-12, (VITAMIN B-12 ORAL), Take 1,000 mcg by mouth once daily., Disp: , Rfl:     diphenhydrAMINE (Sominex) 25 mg tablet, Take 1 tablet (25 mg) by mouth if needed for sleep., Disp: , Rfl:     diphenhydrAMINE-acetaminophen (Tylenol PM)  mg per tablet, Take 1 tablet by mouth as needed at bedtime for sleep., Disp: , Rfl:     fluticasone (Flonase) 50 mcg/actuation nasal spray, Administer 1 spray into each nostril once daily as needed for rhinitis or allergies. Shake gently. Before first use, prime pump. After use, clean tip and replace cap., Disp: , Rfl:     gabapentin (Neurontin) 300 mg capsule, Take 1 capsule (300 mg) by mouth 2 times a day for 14 days., Disp: 28 capsule, Rfl: 0    LORazepam (Ativan) 0.5 mg tablet, Take 2 tablets (1 mg) by mouth 3 times a day as needed (anxiety)., Disp: , Rfl:      "multivitamin-children's (Cerovite, Jr) chewable tablet, Chew 2 tablets once daily. Flintstones chewable, Disp: , Rfl:     semaglutide, weight loss, (Wegovy) 2.4 mg/0.75 mL pen injector, Inject 2.4 mg under the skin every 7 days., Disp: 9 mL, Rfl: 3    tamoxifen (Nolvadex) 20 mg tablet, Take 1 tablet (20 mg total) by mouth once daily.  Take with water or any other nonalcoholic drink with or without food at around the same time(s) every day., Disp: 90 tablet, Rfl: 1    zinc acetate 50 mg (zinc) capsule, Take 1 tablet by mouth once daily., Disp: , Rfl:       OBJECTIVE [x]Expand by Default      2/8/2024    10:05 AM 2/13/2024     1:54 PM 2/20/2024    11:03 AM 2/27/2024    11:22 AM 3/5/2024    11:55 AM 3/12/2024    11:32 AM 3/19/2024    11:37 AM   Vitals   Systolic 124 116 127  128  123   Diastolic 79 88 78  89  88   Heart Rate 77 98 94  90  87   Temp 37 °C (98.6 °F)         Resp 14         Height (in)  1.68 m (5' 6.14\") 1.676 m (5' 6\") 1.676 m (5' 6\") 1.676 m (5' 6\") 1.676 m (5' 6\") 1.676 m (5' 6\")   Weight (lb)  167 166 167 160 160 166   BMI  26.84 kg/m2 26.79 kg/m2 26.95 kg/m2 25.82 kg/m2 25.82 kg/m2 26.79 kg/m2   BSA (m2)  1.88 m2 1.87 m2 1.88 m2 1.84 m2 1.84 m2 1.87 m2   Visit Report  Report Report Report Report Report Report       REVIEW OF SYSTEMS:    Constitutional: negative for fevers, chills, unintentional weight loss  HEENT: negative for changes in vision, headaches, changes in hearing, congestion, sore throat  Cardiovascular: negative for chest pain, palpitations  Respiratory: negative for cough, shortness of breath  Gastrointestinal: negative for nausea, vomiting, diarrhea  Genitourinary: negative for dysuria, hematuria  Musculoskeletal: negative for joint swelling or pain  Skin: positive for redness along the surgical scars are the right breast  Psych: negative for depression, anxiety  Endocrine: negative for polyuria, polydipsia, cold/heat intolerance  Hem/Lymph: negative for bleeding disorder     PHYSICAL " EXAM  General: alert and oriented, no apparent distress.     Focused exam of the breasts:  Right: Incisions C/D/I.  The tissue is soft and mobile over chest wall, but thin in medial pole.      Left: no significant seroma.  TE mobile on chest wall.    Left  TE total 535  2/20 Expanded with 50ml       2/27 +   150  3/5 + 50 ml   3/12 + 50 ml (20 ml cloudy aspirate removed from seroma)  Total 300 ml  3/19/24 no expansion today   4/16/24 no expansion today        ASSESSMENT/PLAN  Iris Barth 43 y.o. female who had Right side TE removal on 2/7/24 presents for POV.     We discussed bilateral breast fat grafting and placement of an implant on the right breast.  The fat grafting on the right chest wall would help thicken the subcutaneous layer of the medial chest to help with future expansion.  We discussed the possibility of autologous and the patient does not want autologous at this time.  The risks of the procedure were discussed with her prior to surgery.  These include but are not limited to the risks of anesthesia, infection, bleeding, pain, need for further procedures, hematoma, seroma, wound healing complications, and asymmetry.       Return July 2nd for preoperative visit with plan for 7/22 surgery    Scribe Attestation  By signing my name below, Kaylah BOOTH Scribe   attest that this documentation has been prepared under the direction and in the presence of Ayah Edgar MD. Obtained verbal consent for scribe from patient.     Attending Attestation:  Ayah BOOTH MD, personal performed the history, exam, and decision making on this patient.  A total of 20 mins were spent in patient counseling, review of medical records, and coordination of care.

## 2024-04-16 ENCOUNTER — OFFICE VISIT (OUTPATIENT)
Dept: PLASTIC SURGERY | Facility: CLINIC | Age: 44
End: 2024-04-16
Payer: COMMERCIAL

## 2024-04-16 VITALS
HEIGHT: 66 IN | SYSTOLIC BLOOD PRESSURE: 120 MMHG | HEART RATE: 96 BPM | WEIGHT: 167 LBS | DIASTOLIC BLOOD PRESSURE: 85 MMHG | BODY MASS INDEX: 26.84 KG/M2

## 2024-04-16 DIAGNOSIS — C50.812 MALIGNANT NEOPLASM OF OVERLAPPING SITES OF LEFT BREAST IN FEMALE, ESTROGEN RECEPTOR POSITIVE (MULTI): Primary | ICD-10-CM

## 2024-04-16 DIAGNOSIS — N65.1 BREAST ASYMMETRY FOLLOWING RECONSTRUCTIVE SURGERY: ICD-10-CM

## 2024-04-16 DIAGNOSIS — Z17.0 MALIGNANT NEOPLASM OF OVERLAPPING SITES OF LEFT BREAST IN FEMALE, ESTROGEN RECEPTOR POSITIVE (MULTI): Primary | ICD-10-CM

## 2024-04-16 PROCEDURE — 99213 OFFICE O/P EST LOW 20 MIN: CPT | Performed by: SURGERY

## 2024-04-16 PROCEDURE — 3008F BODY MASS INDEX DOCD: CPT | Performed by: SURGERY

## 2024-04-17 PROBLEM — N65.1 BREAST ASYMMETRY FOLLOWING RECONSTRUCTIVE SURGERY: Status: ACTIVE | Noted: 2024-04-16

## 2024-05-13 ENCOUNTER — OFFICE VISIT (OUTPATIENT)
Dept: HEMATOLOGY/ONCOLOGY | Facility: CLINIC | Age: 44
End: 2024-05-13
Payer: COMMERCIAL

## 2024-05-13 VITALS
WEIGHT: 170.64 LBS | OXYGEN SATURATION: 98 % | SYSTOLIC BLOOD PRESSURE: 123 MMHG | BODY MASS INDEX: 27.54 KG/M2 | RESPIRATION RATE: 16 BRPM | TEMPERATURE: 98.4 F | DIASTOLIC BLOOD PRESSURE: 77 MMHG

## 2024-05-13 DIAGNOSIS — C50.812 MALIGNANT NEOPLASM OF OVERLAPPING SITES OF LEFT BREAST IN FEMALE, ESTROGEN RECEPTOR POSITIVE (MULTI): Primary | ICD-10-CM

## 2024-05-13 DIAGNOSIS — Z17.0 MALIGNANT NEOPLASM OF OVERLAPPING SITES OF LEFT BREAST IN FEMALE, ESTROGEN RECEPTOR POSITIVE (MULTI): Primary | ICD-10-CM

## 2024-05-13 PROCEDURE — 3008F BODY MASS INDEX DOCD: CPT | Performed by: STUDENT IN AN ORGANIZED HEALTH CARE EDUCATION/TRAINING PROGRAM

## 2024-05-13 PROCEDURE — 99215 OFFICE O/P EST HI 40 MIN: CPT | Performed by: STUDENT IN AN ORGANIZED HEALTH CARE EDUCATION/TRAINING PROGRAM

## 2024-05-13 ASSESSMENT — PAIN SCALES - GENERAL: PAINLEVEL: 0-NO PAIN

## 2024-05-13 NOTE — PROGRESS NOTES
[facility]  Location: [place]    Visit Type: Follow-up Visit    Oncologic History:    10/24/2023: Screening mammogram: Bilateral breast masses, one in the right and two in the left.     11/3/23: Diagnostic breast imaging: Within the right breast, at 12:00 position 6 cm from nipple, an indeterminate mass measuring 0.5 x 0.5 x 0.4 cm is identified. Within the left breast, at 2:00 position 8 cm from nipple, an indeterminate, partially circumscribed parallel hypoechoic mass measuring 0.4 x 0.3 x 0.2 cm is identified. L breast at 11:00 position 7 cm from pole, a suspicious irregular hypoechoic mass measuring 0.9 x 0.4 x 0.4 cm is identified. L breast At 1:00 position 7 cm from  the nipple, a suspicious irregular non parallel shadowing mass is identified, measuring 0.4 x 0.3 x 0.3 cm. Ultrasound examination of the left axilla demonstrates 3 borderline lymph lymph nodes with cortex measuring approximately 0.3 cm in  thickness.    11/10/23: Breast US and mammogram: Suspicious left breast masses at the 11 o'clock and 1 o'clock position without axillary lymphadenopathy. Indeterminate right breast mass without a sonographic correlate. Probably benign left breast mass at the 2 o'clock position which may represent a complicated cyst. A six-month follow-up ultrasound is recommended to document stability.    11/10/23: L breast 1:00 bx: IDC G2, Er >95%, RI> 95%, HER2 equivocal, negative by DELMY; L breast 11:00 Bx: IDC, G2-3 ER 80%, RI 80%, HER2 equivocal, negative by DELMY ; R breast bx: fibroadenomatoid nodule.     1/8/24: Bilateral mastectomy. R breast fibroadenoma. L breast 2 foci of IDC, G2, greatest dimension 11 mm, all margins negative, 5 negative LNs.     2/2/24: Genetic testing: negative    2/13/24: Started tamoxifen      Subjective: History of Present Illness    Presents today for follow-up visit.     Has been on tamoxifen for about 3 months. Overall tolerating it well. Denies hot flashes. Reports  fatigue. She has slight joint pain. Has noticed shorter periods.     Has noticed vaginal dryness and easier propensity for yeast infections. Developed one after receiving one dose of abx for dental issues.     Otherwise, she feels ok.    Breast surgery planned with plastics team in July.     Gynecologic History:     Age of first menses: 12 years old  ; FLB at age 25  Pre-menopausal  She did breastfeed  Uterus/Ovaries: Intact  OCP: 12 years    Pertinent Family history:    Breast Cancer:  Maternal grandmother  Ovarian Cancer: None  Pancreatic Cancer: None  Other:  Maternal grandfather with bone cancer    Social History  Iris Barth  reports that she has never smoked. She has never been exposed to tobacco smoke. She has never used smokeless tobacco.  She  reports current alcohol use.  She  reports no history of drug use.    ROS:     Review of Systems   All other systems reviewed and are negative.       Physical Examination:    /77   Temp 36.9 °C (98.4 °F) (Temporal)   Resp 16   Wt 77.4 kg (170 lb 10.2 oz)   SpO2 98%   BMI 27.54 kg/m²     Physical Exam  Vitals and nursing note reviewed.   Constitutional:       General: She is not in acute distress.     Appearance: Normal appearance. She is not toxic-appearing.   HENT:      Head: Normocephalic and atraumatic.   Eyes:      Conjunctiva/sclera: Conjunctivae normal.   Cardiovascular:      Rate and Rhythm: Normal rate.   Pulmonary:      Effort: Pulmonary effort is normal. No respiratory distress.   Abdominal:      General: Abdomen is flat.      Palpations: Abdomen is soft.   Musculoskeletal:         General: No swelling. Normal range of motion.      Cervical back: Normal range of motion.   Skin:     General: Skin is warm and dry.   Neurological:      General: No focal deficit present.      Mental Status: She is alert.   Psychiatric:         Mood and Affect: Mood normal.       Breast Examination:    s/p bilateral skin sparing mastectomies with placement  of tissue expanders on L, removed on R.     ECOG Performance Status:     [x] 0 Fully active, able to carry on all pre-disease performance without restriction  [] 1 Restricted in physically strenuous activity but ambulatory and able to carry out work of a light or sedentary nature, e.g., light house work, office work  [] 2 Ambulatory and capable of all selfcare but unable to carry out any work activities; up and about more than 50% of waking hours  [] 3 Capable of only limited selfcare; confined to bed or chair more than 50% of waking hours  [] 4 Completely disabled; cannot carry on any selfcare; totally confined to bed or chair  [] 5 Dead     Results:    Labs:      Lab Results   Component Value Date    WBC 10.4 02/06/2024    HGB 13.3 02/06/2024    HCT 38.7 02/06/2024    MCV 92 02/06/2024     02/06/2024       Chemistry    Lab Results   Component Value Date/Time     02/06/2024 1720    K 3.4 (L) 02/06/2024 1720     02/06/2024 1720    CO2 23 02/06/2024 1720    BUN 19 02/06/2024 1720    CREATININE 0.78 02/06/2024 1720    Lab Results   Component Value Date/Time    CALCIUM 10.0 02/06/2024 1720    ALKPHOS 65 01/04/2024 0908    AST 21 01/04/2024 0908    ALT 23 01/04/2024 0908    BILITOT 0.4 01/04/2024 0908             Imaging:  Reviewed above in Onc History    Pathology:  Reviewed above in Onc History    Assessment:     Pathologic prognostic stage IA (pT1c(m) pN0 cM0) infiltrating ductal carcinoma of the left breast; Dx in 11/2023; Grade 2; ER positive (>95 and 80%), NM positive (>95 and 80%), HER2 negative (equivocal, neg by DELMY); Oncotype DX RS 15 ; S/p bilateral skin sparing mastectomies with placement of tissue expanders, post-op course complicated by tissue necrosis and infection. On tamoxifen since 2/2024    Iris Barth is a very pleasant 43 y.o. premenopausal female with newly diagnosed breast cancer with history of provoked DVT after Knee arthroscopy. Tolerating tamoxifen relatively well.  Will monitor and discuss further change if needed. Instructions given to hold tamoxifen perioperatively around upcoming breast surgery.     Plan:    Surgical Plan: S/p bilateral skin sparing mastectomies with placement of tissue expanders  Additional biopsy: No further biopsy indicated  Radiation Plan: Not indicated  Additional staging scans/DEXA/echo: Staging scans not indicated based on current stage, patient history and physical examination.  Additional Path info (i.e Ki67, PDL1): Not indicated  Gene assays: Oncotype DX RS 15    Systemic treatment plan: Tamoxifen   Intent: Curative   Clinical trial: Not eligible for our current trials   Endocrine therapy: Tamoxifen   HER2 treatment: not indicated   Targeted agents: not indicated   Chemotherapy: Not indicated   BMA: Not indicated    Access: Not indicated  Supportive meds: No new supportive medications prescribed  Genetic testing: Completed; negative  Fertility preservation: Not indicated; no further child bearing plans  Other active problems/orders:     Provoked DVT is setting of knee arthroscopy: understands risk of VTE with tamoxifen and s/s to watch for and that tamoxifen needs to be stopped perioperatively in the future  We spent a portion of our encounter discussing lifestyle modifications that may help with cancer outcomes and overall wellbeing. We discussed regular exercise aiming for 30 minutes 5 times a week. We discussed diet modifications such as limiting red meat and processed foods. We also discussed limiting alcohol intake.   Anxiety: related to cancer diagnosis and ongoing care. Has great support at home. Support provided today, discussed strategies for management and incorporating SW and referral to onco-psychiatry.     Surveillance plan: No routine breast imaging indicated    Follow-up: 3 months    Patient expressed understanding of the plan outlined above. She had ample time to ask questions. She understands she can contact us should she have  additional questions or issues arise in the interim.

## 2024-06-29 NOTE — PROGRESS NOTES
"PLASTIC SURGERY PRE-OP CLINIC NOTE    DATE: 7/2/24  Subjective :  Patient ID: Iris Barth  is a 43 y.o. female is here for pre-op appointment     Planned Date of Procedure: 7/22/24  Planned Surgical Procedure: Left side expander to implant exchange; Bilateral Fat Grafting     HPI:   Iris Barth is a 43 y.o. female is here for pre-operative appointment for the above procedure(s).     Currently, the patient states there were no changes in their medications or medical history.     Patient's vitals are Blood pressure (!) 137/98, pulse 88, height 1.676 m (5' 6\"), weight 76.2 kg (168 lb).      The patient does not have their post-operative compressive garments today.     Patient is currently on an ACE, ARB or Diuretic and has started to transition the medication dosing to the evening.     Patient is not currently on an ACE, ARB, or Diuretic.     Patient has Good Rx Card.     Patient is not on chronic NSAIDs      MEDICATIONS    Current Outpatient Medications:     cetirizine (ZyrTEC) 10 mg tablet, Take 1 tablet (10 mg) by mouth once daily as needed for allergies., Disp: , Rfl:     cholecalciferol (Vitamin D-3) 25 MCG (1000 UT) tablet, Take 1 tablet (25 mcg) by mouth once daily., Disp: , Rfl:     cyanocobalamin, vitamin B-12, (VITAMIN B-12 ORAL), Take 1,000 mcg by mouth once daily., Disp: , Rfl:     diphenhydrAMINE-acetaminophen (Tylenol PM)  mg per tablet, Take 1 tablet by mouth as needed at bedtime for sleep., Disp: , Rfl:     fluticasone (Flonase) 50 mcg/actuation nasal spray, Administer 1 spray into each nostril once daily as needed for rhinitis or allergies. Shake gently. Before first use, prime pump. After use, clean tip and replace cap., Disp: , Rfl:     LORazepam (Ativan) 0.5 mg tablet, Take 2 tablets (1 mg) by mouth 3 times a day as needed (anxiety)., Disp: , Rfl:     multivitamin-children's (Cerovite, Jr) chewable tablet, Chew 2 tablets once daily. Flintstones chewable, Disp: , Rfl:     " semaglutide, weight loss, (Wegovy) 2.4 mg/0.75 mL pen injector, Inject 2.4 mg under the skin every 7 days., Disp: 9 mL, Rfl: 3    tamoxifen (Nolvadex) 20 mg tablet, Take 1 tablet (20 mg total) by mouth once daily.  Take with water or any other nonalcoholic drink with or without food at around the same time(s) every day., Disp: 90 tablet, Rfl: 1    zinc acetate 50 mg (zinc) capsule, Take 1 tablet by mouth once daily., Disp: , Rfl:      REVIEW OF SYSTEMS:    Constitutional: negative for fevers, chills, unintentional weight loss  HEENT: negative for changes in vision, headaches, changes in hearing, congestion, sore throat  Cardiovascular: negative for chest pain, palpitations  Respiratory: negative for cough, shortness of breath  Gastrointestinal: negative for nausea, vomiting, diarrhea  Genitourinary: negative for dysuria, hematuria  Musculoskeletal: negative for joint swelling or pain  Skin: negative for rashes or lesions  Psych: negative for depression, anxiety  Endocrine: negative for polyuria, polydipsia, cold/heat intolerance  Hem/Lymph: negative for bleeding disorder    PHYSICAL EXAM  General: alert and oriented, no apparent distress  Psych: normal mood and affect, judgement intact.   Eyes: No conjunctival erythema, extraocular movements intact, no scleral icterus, pupils equal and reactive to light  HENT: normocephalic, atraumatic, no rhinorrhea, no trauma to external meatus, no prior surgical scars  CV: hemodynamically stable  Resp: unlabored, no increased work of breathing, equal bilateral chest rise, no cough or stridor  Abdomen: soft, non-tender, non-distended. No surgical scars present. No rashes noted. No rectus diastasis present   Neuro: Unremarkable without focal findings, AAOx3, CN 2-12 grossly intact  Extremities/Musculoskeletal: Upper and lower extremities are atraumatic in appearance without tenderness or deformity. No swelling or erythema. Full range of motion is noted to all joints. Steady gait  noted.    Skin: Intact, soft and warm; no rashes, lesions, or bruising. No large masses or keloids noted on exam.     Focused exam of the breasts:  Right: Incisions well healed   Left: incisions well healed    LABORATORY  Nutrition  Lab Results   Component Value Date    ALBUMIN 4.7 02/06/2024          ASSESSMENT/PLAN  Iris Barth is a 43 y.o. female who had removal of tissue expander (R), debridement of mastectomy skin necrosis,  and Adjacent tissue rearrangment on 2/7.  She is here for preoperative visit   The patient will be undergoing Left side expander to implant exchange and Bilateral Fat Grafting on 7/22/24 at [facility] [place]. We discussed she will likely need another round  of fat grafting.     Informed consent discussed with the patient, including: condition, proposed care, treatments and services, alternative forms of treatment, and risks of no treatment.  Details discussed around the procedures to be used, and the risks and hazards involved, potential benefits, and side effects of the patient's proposed care, treatment, and services; the likelihood of the patient achieving his or her goals; and any potential problems that might occur during recuperation. Reasonable alternative also discussed with the patient's proposed care, treatment, and services. The discussion encompasses risks, benefits, and side effects related to the alternative and risks related to not receiving the proposed care, treatment, and services. Written informed consent was obtained.    The patient was counseled to avoid anticoagulation medications and herbals including - but not limited to - ASA, NSAIDs, Plavix, fish oils, and green tea    Patient was counseled to avoid nicotine and areas with high amounts of secondhand smoke.     Patient was counseled to increase protein intake after surgery to aid with wound healing with goal of 120g/day.     Patient was counseled on need for compression garments and/or support bras, given information about  where to acquire these garments, and instructions to bring with on the day of surgery.     We discussed DIANNA drain care    We discussed usage of Prevena Yolie VAC    We discussed postoperative follow-up visits, recuperation and return to work.    Advised patient to contact office with any questions or concerns    All findings and diagnosis was discussed with patient at the time of this visit. Patient states they understand and are in agreement with the treatment plan at the time of this visit.     Photos completed at this visit.     Medications eRx'd:   - Valium 5mg PO x 1 to be taken morning of surgery (ie while driving over) for white coat HTN.   -Celebrex 200 mg BID with meals- Good Rx card provided to the patient.  -Gabapentin 300 mg Q8H  -Tylenol 1000 mg Q8H  -BactrimDS BID x 7 days   -Hibiclens - instructed the patient to wash breast and/or abdomen and margins the night before surgery or the morning of surgery; avoid washing face/eyes (2 packets if breast only, and 5 packets if breast and abdomen)      RTC 7/30/24 after surgery     Scribe Attestation  By signing my name below, Kaylah BOOTH Scribjovanny   attest that this documentation has been prepared under the direction and in the presence of Ayah Edgar MD. Obtained verbal consent for scribe from patient.     Attending Attestation:  Ayah BOOTH MD, personal performed the history, exam, and decision making on this patient.  A total of 30 mins were spent in patient counseling, review of medical records, and coordination of care.

## 2024-06-29 NOTE — H&P (VIEW-ONLY)
"PLASTIC SURGERY PRE-OP CLINIC NOTE    DATE: 7/2/24  Subjective :  Patient ID: Iris Barth  is a 43 y.o. female is here for pre-op appointment     Planned Date of Procedure: 7/22/24  Planned Surgical Procedure: Left side expander to implant exchange; Bilateral Fat Grafting     HPI:   Iris Barth is a 43 y.o. female is here for pre-operative appointment for the above procedure(s).     Currently, the patient states there were no changes in their medications or medical history.     Patient's vitals are Blood pressure (!) 137/98, pulse 88, height 1.676 m (5' 6\"), weight 76.2 kg (168 lb).      The patient does not have their post-operative compressive garments today.     Patient is currently on an ACE, ARB or Diuretic and has started to transition the medication dosing to the evening.     Patient is not currently on an ACE, ARB, or Diuretic.     Patient has Good Rx Card.     Patient is not on chronic NSAIDs      MEDICATIONS    Current Outpatient Medications:     cetirizine (ZyrTEC) 10 mg tablet, Take 1 tablet (10 mg) by mouth once daily as needed for allergies., Disp: , Rfl:     cholecalciferol (Vitamin D-3) 25 MCG (1000 UT) tablet, Take 1 tablet (25 mcg) by mouth once daily., Disp: , Rfl:     cyanocobalamin, vitamin B-12, (VITAMIN B-12 ORAL), Take 1,000 mcg by mouth once daily., Disp: , Rfl:     diphenhydrAMINE-acetaminophen (Tylenol PM)  mg per tablet, Take 1 tablet by mouth as needed at bedtime for sleep., Disp: , Rfl:     fluticasone (Flonase) 50 mcg/actuation nasal spray, Administer 1 spray into each nostril once daily as needed for rhinitis or allergies. Shake gently. Before first use, prime pump. After use, clean tip and replace cap., Disp: , Rfl:     LORazepam (Ativan) 0.5 mg tablet, Take 2 tablets (1 mg) by mouth 3 times a day as needed (anxiety)., Disp: , Rfl:     multivitamin-children's (Cerovite, Jr) chewable tablet, Chew 2 tablets once daily. Flintstones chewable, Disp: , Rfl:     " semaglutide, weight loss, (Wegovy) 2.4 mg/0.75 mL pen injector, Inject 2.4 mg under the skin every 7 days., Disp: 9 mL, Rfl: 3    tamoxifen (Nolvadex) 20 mg tablet, Take 1 tablet (20 mg total) by mouth once daily.  Take with water or any other nonalcoholic drink with or without food at around the same time(s) every day., Disp: 90 tablet, Rfl: 1    zinc acetate 50 mg (zinc) capsule, Take 1 tablet by mouth once daily., Disp: , Rfl:      REVIEW OF SYSTEMS:    Constitutional: negative for fevers, chills, unintentional weight loss  HEENT: negative for changes in vision, headaches, changes in hearing, congestion, sore throat  Cardiovascular: negative for chest pain, palpitations  Respiratory: negative for cough, shortness of breath  Gastrointestinal: negative for nausea, vomiting, diarrhea  Genitourinary: negative for dysuria, hematuria  Musculoskeletal: negative for joint swelling or pain  Skin: negative for rashes or lesions  Psych: negative for depression, anxiety  Endocrine: negative for polyuria, polydipsia, cold/heat intolerance  Hem/Lymph: negative for bleeding disorder    PHYSICAL EXAM  General: alert and oriented, no apparent distress  Psych: normal mood and affect, judgement intact.   Eyes: No conjunctival erythema, extraocular movements intact, no scleral icterus, pupils equal and reactive to light  HENT: normocephalic, atraumatic, no rhinorrhea, no trauma to external meatus, no prior surgical scars  CV: hemodynamically stable  Resp: unlabored, no increased work of breathing, equal bilateral chest rise, no cough or stridor  Abdomen: soft, non-tender, non-distended. No surgical scars present. No rashes noted. No rectus diastasis present   Neuro: Unremarkable without focal findings, AAOx3, CN 2-12 grossly intact  Extremities/Musculoskeletal: Upper and lower extremities are atraumatic in appearance without tenderness or deformity. No swelling or erythema. Full range of motion is noted to all joints. Steady gait  noted.    Skin: Intact, soft and warm; no rashes, lesions, or bruising. No large masses or keloids noted on exam.     Focused exam of the breasts:  Right: Incisions well healed   Left: incisions well healed    LABORATORY  Nutrition  Lab Results   Component Value Date    ALBUMIN 4.7 02/06/2024          ASSESSMENT/PLAN  Iris Barth is a 43 y.o. female who had removal of tissue expander (R), debridement of mastectomy skin necrosis,  and Adjacent tissue rearrangment on 2/7.  She is here for preoperative visit   The patient will be undergoing Left side expander to implant exchange and Bilateral Fat Grafting on 7/22/24 at [facility] [place]. We discussed she will likely need another round  of fat grafting.     Informed consent discussed with the patient, including: condition, proposed care, treatments and services, alternative forms of treatment, and risks of no treatment.  Details discussed around the procedures to be used, and the risks and hazards involved, potential benefits, and side effects of the patient's proposed care, treatment, and services; the likelihood of the patient achieving his or her goals; and any potential problems that might occur during recuperation. Reasonable alternative also discussed with the patient's proposed care, treatment, and services. The discussion encompasses risks, benefits, and side effects related to the alternative and risks related to not receiving the proposed care, treatment, and services. Written informed consent was obtained.    The patient was counseled to avoid anticoagulation medications and herbals including - but not limited to - ASA, NSAIDs, Plavix, fish oils, and green tea    Patient was counseled to avoid nicotine and areas with high amounts of secondhand smoke.     Patient was counseled to increase protein intake after surgery to aid with wound healing with goal of 120g/day.     Patient was counseled on need for compression garments and/or support bras, given information about  where to acquire these garments, and instructions to bring with on the day of surgery.     We discussed DIANNA drain care    We discussed usage of Prevena Yolie VAC    We discussed postoperative follow-up visits, recuperation and return to work.    Advised patient to contact office with any questions or concerns    All findings and diagnosis was discussed with patient at the time of this visit. Patient states they understand and are in agreement with the treatment plan at the time of this visit.     Photos completed at this visit.     Medications eRx'd:   - Valium 5mg PO x 1 to be taken morning of surgery (ie while driving over) for white coat HTN.   -Celebrex 200 mg BID with meals- Good Rx card provided to the patient.  -Gabapentin 300 mg Q8H  -Tylenol 1000 mg Q8H  -BactrimDS BID x 7 days   -Hibiclens - instructed the patient to wash breast and/or abdomen and margins the night before surgery or the morning of surgery; avoid washing face/eyes (2 packets if breast only, and 5 packets if breast and abdomen)      RTC 7/30/24 after surgery     Scribe Attestation  By signing my name below, Kaylah BOOTH Scribjovanny   attest that this documentation has been prepared under the direction and in the presence of Ayah Edgar MD. Obtained verbal consent for scribe from patient.     Attending Attestation:  Ayah BOOTH MD, personal performed the history, exam, and decision making on this patient.  A total of 30 mins were spent in patient counseling, review of medical records, and coordination of care.

## 2024-07-02 ENCOUNTER — APPOINTMENT (OUTPATIENT)
Dept: PLASTIC SURGERY | Facility: CLINIC | Age: 44
End: 2024-07-02
Payer: COMMERCIAL

## 2024-07-02 VITALS
HEART RATE: 88 BPM | BODY MASS INDEX: 27 KG/M2 | HEIGHT: 66 IN | DIASTOLIC BLOOD PRESSURE: 98 MMHG | SYSTOLIC BLOOD PRESSURE: 137 MMHG | WEIGHT: 168 LBS

## 2024-07-02 DIAGNOSIS — G89.18 POST-OP PAIN: ICD-10-CM

## 2024-07-02 DIAGNOSIS — Z17.1 MALIGNANT NEOPLASM OF UPPER-OUTER QUADRANT OF LEFT BREAST IN FEMALE, ESTROGEN RECEPTOR NEGATIVE (MULTI): ICD-10-CM

## 2024-07-02 DIAGNOSIS — C50.412 MALIGNANT NEOPLASM OF UPPER-OUTER QUADRANT OF LEFT BREAST IN FEMALE, ESTROGEN RECEPTOR NEGATIVE (MULTI): ICD-10-CM

## 2024-07-02 PROBLEM — Z85.3 ENCOUNTER FOR FOLLOW-UP SURVEILLANCE OF BREAST CANCER: Status: ACTIVE | Noted: 2024-07-02

## 2024-07-02 PROBLEM — Z79.810 USE OF TAMOXIFEN (NOLVADEX): Status: ACTIVE | Noted: 2024-07-02

## 2024-07-02 PROBLEM — Z08 ENCOUNTER FOR FOLLOW-UP SURVEILLANCE OF BREAST CANCER: Status: ACTIVE | Noted: 2024-07-02

## 2024-07-02 PROCEDURE — 3008F BODY MASS INDEX DOCD: CPT | Performed by: SURGERY

## 2024-07-02 PROCEDURE — 99214 OFFICE O/P EST MOD 30 MIN: CPT | Performed by: SURGERY

## 2024-07-02 RX ORDER — SULFAMETHOXAZOLE AND TRIMETHOPRIM 800; 160 MG/1; MG/1
1 TABLET ORAL 2 TIMES DAILY
Qty: 28 TABLET | Refills: 0 | Status: SHIPPED | OUTPATIENT
Start: 2024-07-02 | End: 2024-07-16

## 2024-07-02 RX ORDER — GABAPENTIN 600 MG/1
600 TABLET ORAL ONCE
OUTPATIENT
Start: 2024-07-02 | End: 2024-07-02

## 2024-07-02 RX ORDER — GABAPENTIN 300 MG/1
300 CAPSULE ORAL EVERY 8 HOURS
Qty: 42 CAPSULE | Refills: 0 | Status: SHIPPED | OUTPATIENT
Start: 2024-07-02 | End: 2024-07-16

## 2024-07-02 RX ORDER — ACETAMINOPHEN 500 MG
1000 TABLET ORAL EVERY 8 HOURS
Qty: 84 TABLET | Refills: 0 | Status: SHIPPED | OUTPATIENT
Start: 2024-07-02 | End: 2024-07-16

## 2024-07-02 RX ORDER — CHLORHEXIDINE GLUCONATE 40 MG/ML
SOLUTION TOPICAL ONCE
Qty: 118 ML | Refills: 0 | Status: SHIPPED | OUTPATIENT
Start: 2024-07-02 | End: 2024-07-02

## 2024-07-02 RX ORDER — DIAZEPAM 5 MG/1
5 TABLET ORAL ONCE
Qty: 1 TABLET | Refills: 0 | Status: SHIPPED | OUTPATIENT
Start: 2024-07-02 | End: 2024-07-02

## 2024-07-02 RX ORDER — SCOLOPAMINE TRANSDERMAL SYSTEM 1 MG/1
1 PATCH, EXTENDED RELEASE TRANSDERMAL ONCE
OUTPATIENT
Start: 2024-07-02 | End: 2024-07-02

## 2024-07-02 RX ORDER — CELECOXIB 200 MG/1
200 CAPSULE ORAL 2 TIMES DAILY
Qty: 28 CAPSULE | Refills: 0 | Status: SHIPPED | OUTPATIENT
Start: 2024-07-02 | End: 2024-07-16

## 2024-07-02 NOTE — PROGRESS NOTES
Iris Barth female   1980 43 y.o.  [id number]      Chief Complaint  Follow up clinical exam, history left breast cancer.    History Of Present Illness  Iris Barth is a very pleasant 43 y.o.  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 She had post op complications by tissue necrosis and infection. She completed genetic testing which was negative on 24. 2024 She had removal of right tissue expander with Dr. Bright. She started Tamoxifen 2024 with Dr. Benavidez and tolerating it well. She has family history of breast cancer in her maternal grandmother. She presents today for clinical exam. She is having surgery 24 with Dr. Edgar for left breast implant and bilateral fat grafting.  Stage IA, [id number]    REPRODUCTIVE HISTORY: menarche age 12, , first birth age 25,  x 1 year, OCP's x 12 years, premenopausal, LMP 23 and sporadic, scattered fibroglandular tissue                                FAMILY CANCER HISTORY:   Maternal Grandmother: Breast cancer, unknown age  Maternal Grandfather: Unknown cancer    Review of Systems  Constitutional:  Negative for appetite change, fatigue, fever and unexpected weight change.   HENT:  Negative for ear pain, hearing loss, nosebleeds, sore throat and trouble swallowing.    Eyes:  Negative for discharge, itching and visual disturbance.   Breast: As stated in HPI.  Respiratory:  Negative for cough, chest tightness and shortness of breath.    Cardiovascular:  Negative for chest pain, palpitations and leg swelling.   Gastrointestinal:  Negative for abdominal pain, constipation, diarrhea and nausea.    Endocrine: Negative for cold intolerance and heat intolerance.   Genitourinary:  Negative for dysuria, frequency, hematuria, pelvic pain and vaginal bleeding.   Musculoskeletal:  Negative for arthralgias, back pain, gait problem, joint swelling and myalgias.   Skin:  Negative for color change and rash.   Allergic/Immunologic: Negative for environmental allergies and food allergies.   Neurological:  Negative for dizziness, tremors, speech difficulty, weakness, numbness and headaches.   Hematological:  Does not bruise/bleed easily.   Psychiatric/Behavioral:  Negative for agitation, dysphoric mood and sleep disturbance. The patient is not nervous/anxious.       Past Medical History  She has a past medical history of Abnormal finding on breast imaging (11/07/2023), Acid reflux, Acute frontal sinusitis (10/30/2023), Acute viral syndrome (10/30/2023), Anxiety, Breast CA (Multi), Constipation, COVID-19 (12/09/2023), DVT (deep vein thrombosis) in pregnancy ([facility]), and Heart murmur.    Surgical History  She has a past surgical history that includes Knee surgery (08/31/2015); BI US guided breast localization and biopsy left (Left, 11/10/2023); BI stereotactic guided breast right localization and biopsy (Right, 11/10/2023); and Belt abdominoplasty.    Family History  Cancer-related family history includes Breast cancer in her maternal grandmother and paternal grandmother; Cancer in an other family member; Skin cancer in her father.     Social History  She reports that she has never smoked. She has never been exposed to tobacco smoke. She has never used smokeless tobacco. She reports current alcohol use. She reports that she does not use drugs.    Allergies  Patient has no known allergies.    Medications  Current Outpatient Medications   Medication Instructions    acetaminophen (TYLENOL EXTRA STRENGTH) 1,000 mg, oral, Every 8 hours    celecoxib (CELEBREX) 200 mg, oral, 2 times daily    cetirizine (ZYRTEC) 10 mg, oral,  Daily PRN    cholecalciferol (Vitamin D-3) 25 MCG (1000 UT) tablet 1 tablet, oral, Daily    cyanocobalamin, vitamin B-12, (VITAMIN B-12 ORAL) 1,000 mcg, oral, Daily    diazePAM (VALIUM) 5 mg, oral, Once    diphenhydrAMINE-acetaminophen (Tylenol PM)  mg per tablet 1 tablet, oral, Nightly PRN    fluticasone (Flonase) 50 mcg/actuation nasal spray 1 spray, Each Nostril, Daily PRN, Shake gently. Before first use, prime pump. After use, clean tip and replace cap.    gabapentin (NEURONTIN) 300 mg, oral, Every 8 hours    LORazepam (ATIVAN) 1 mg, oral, 3 times daily PRN    multivitamin-children's (Cerovite, Jr) chewable tablet 2 tablets, oral, Daily, Flintstones chewable    sulfamethoxazole-trimethoprim (Bactrim DS) 800-160 mg tablet 1 tablet, oral, 2 times daily    tamoxifen (NOLVADEX) 20 mg, oral, Daily, Take with water or any other nonalcoholic drink with or without food at around the same time(s) every day.    Wegovy 2.4 mg, subcutaneous, Every 7 days    zinc acetate 50 mg (zinc) capsule 1 tablet, oral, Daily       Last Recorded Vitals  Blood pressure 123/85, pulse 96, temperature 36.9 °C (98.4 °F), temperature source Core, resp. rate 16.      Physical Exam  Chest:            Patient is alert and oriented x3 and in a relaxed and appropriate mood. Her gait is steady and hand grasps are equal. Sclera is clear. Bialteral breasts have been removed with well-healed mastectomy incision. Overlying tissue soft without palpable abnormalities, discrete nodules or masses. Left breast implant is soft and mobile. There is no cervical, supraclavicular or axillary lymphadenopathy. Heart rate and rhythm normal, S1 and S2 appreciated. The lungs are clear to auscultation bilaterally. Abdomen is soft and non-tender.      Visit Diagnosis  1. Encounter for follow-up surveillance of breast cancer  Clinic Appointment Request Follow Up      2. Use of tamoxifen (Nolvadex)          Assessment/Plan  Breast cancer surveillance, normal  clinical exam, history left breast cancer, s/p bilateral mastectomy, on Tamoxifen, negative genetics, family history breast cancer    Plan:  Return January 2025 for clinical exam and office visit. Continue Tamoxifen.    Patient Discussion/Summary  Your clinical examination is normal. Please return in January 2025 for clinical exam and office visit or sooner if you have any problems or concerns.       If you receive medical information from My [facility] Personal Health Record (online chart) your results will be released into your chart. This means you may view or see results of your biopsy or procedure before I contact you directly. If this occurs, please call the office and we will discuss your results over the phone.    You can see your health information, review clinical summaries from office visits & test results online when you follow your health with MY  Chart, a personal health record. To sign up go to [email or web address]. If you need assistance with signing up or trouble getting into your account call [employer] Patient Line 24/7 at [phone].    My office phone number is [phone]  if you need to get in touch with me or have additional questions or concerns. Thank you for choosing [facility] and trusting me as your healthcare provider. I look forward to seeing you again at your next office visit. I am honored to be a provider on your health care team and I remain dedicated to helping you achieve your health goals.       CARMELITA Sales-CNP

## 2024-07-03 ENCOUNTER — OFFICE VISIT (OUTPATIENT)
Dept: SURGICAL ONCOLOGY | Facility: CLINIC | Age: 44
End: 2024-07-03
Payer: COMMERCIAL

## 2024-07-03 VITALS
RESPIRATION RATE: 16 BRPM | SYSTOLIC BLOOD PRESSURE: 123 MMHG | HEART RATE: 96 BPM | DIASTOLIC BLOOD PRESSURE: 85 MMHG | TEMPERATURE: 98.4 F

## 2024-07-03 DIAGNOSIS — Z79.810 USE OF TAMOXIFEN (NOLVADEX): ICD-10-CM

## 2024-07-03 DIAGNOSIS — Z08 ENCOUNTER FOR FOLLOW-UP SURVEILLANCE OF BREAST CANCER: Primary | ICD-10-CM

## 2024-07-03 DIAGNOSIS — Z85.3 ENCOUNTER FOR FOLLOW-UP SURVEILLANCE OF BREAST CANCER: Primary | ICD-10-CM

## 2024-07-03 PROCEDURE — 99213 OFFICE O/P EST LOW 20 MIN: CPT | Performed by: NURSE PRACTITIONER

## 2024-07-03 PROCEDURE — 3008F BODY MASS INDEX DOCD: CPT | Performed by: NURSE PRACTITIONER

## 2024-07-03 ASSESSMENT — PAIN SCALES - GENERAL: PAINLEVEL: 0-NO PAIN

## 2024-07-03 ASSESSMENT — ENCOUNTER SYMPTOMS
LOSS OF SENSATION IN FEET: 0
OCCASIONAL FEELINGS OF UNSTEADINESS: 0
DEPRESSION: 0

## 2024-07-18 ENCOUNTER — TELEPHONE (OUTPATIENT)
Dept: ADMISSION | Facility: HOSPITAL | Age: 44
End: 2024-07-18
Payer: COMMERCIAL

## 2024-07-18 NOTE — TELEPHONE ENCOUNTER
Spoke with patient she understands the plan to resume tamoxifen 2 weeks after surgery. She questioned if it was okay for her to be off for a month. I provided reassurance to the patient that she should be off 2 weeks prior to and 2 weeks after surgery. Patient verbalized understanding and is in agreement with the plan.

## 2024-07-18 NOTE — TELEPHONE ENCOUNTER
Pt having surgery Monday 7/22. She was instructed by team to stop tamoxifen 2 weeks prior to surgery, but she was not advised when to restart tamoxifen.

## 2024-07-19 ENCOUNTER — ANESTHESIA EVENT (OUTPATIENT)
Dept: OPERATING ROOM | Facility: HOSPITAL | Age: 44
End: 2024-07-19
Payer: COMMERCIAL

## 2024-07-19 NOTE — ANESTHESIA PREPROCEDURE EVALUATION
Patient: Iris Barth    Procedure Information       Date/Time: 07/22/24 4108    Procedures:       Left Breast Tissue Expander Removal (Left: Breast)      Adipose Tissue Insertion (Bilateral: Breast)    Location: [facility] A OR 05 / Virtual [facility] A OR    Surgeons: Ayah Edgar MD            Relevant Problems   Anesthesia   (+) PONV (postoperative nausea and vomiting)      Neuro   (+) Anxiety      GI   (+) Gastroesophageal reflux disease      Musculoskeletal   (+) Chondromalacia of patella      ID   (+) Infection of right breast   (+) Surgical site infection      GYN   (+) Malignant neoplasm of overlapping sites of left breast in female, estrogen receptor positive (Multi)       Clinical information reviewed:                   NPO Detail:  No data recorded     Physical Exam    Airway  Mallampati: II     Cardiovascular   Rhythm: regular  Rate: normal     Dental    Pulmonary    Abdominal            Anesthesia Plan    History of general anesthesia?: yes  History of complications of general anesthesia?: no    ASA 2     general     intravenous induction   Anesthetic plan and risks discussed with patient.    Plan discussed with CRNA.

## 2024-07-22 ENCOUNTER — ANESTHESIA (OUTPATIENT)
Dept: OPERATING ROOM | Facility: HOSPITAL | Age: 44
End: 2024-07-22
Payer: COMMERCIAL

## 2024-07-22 ENCOUNTER — HOSPITAL ENCOUNTER (OUTPATIENT)
Facility: HOSPITAL | Age: 44
Setting detail: OUTPATIENT SURGERY
Discharge: HOME | End: 2024-07-22
Attending: SURGERY | Admitting: SURGERY
Payer: COMMERCIAL

## 2024-07-22 VITALS
WEIGHT: 172.84 LBS | SYSTOLIC BLOOD PRESSURE: 118 MMHG | OXYGEN SATURATION: 100 % | TEMPERATURE: 99.1 F | DIASTOLIC BLOOD PRESSURE: 73 MMHG | RESPIRATION RATE: 17 BRPM | HEART RATE: 84 BPM | HEIGHT: 66 IN | BODY MASS INDEX: 27.78 KG/M2

## 2024-07-22 DIAGNOSIS — Z17.1 MALIGNANT NEOPLASM OF UPPER-OUTER QUADRANT OF LEFT BREAST IN FEMALE, ESTROGEN RECEPTOR NEGATIVE (MULTI): ICD-10-CM

## 2024-07-22 DIAGNOSIS — C50.812 MALIGNANT NEOPLASM OF OVERLAPPING SITES OF LEFT BREAST IN FEMALE, ESTROGEN RECEPTOR POSITIVE (MULTI): ICD-10-CM

## 2024-07-22 DIAGNOSIS — N65.1 BREAST ASYMMETRY FOLLOWING RECONSTRUCTIVE SURGERY: ICD-10-CM

## 2024-07-22 DIAGNOSIS — Z17.0 MALIGNANT NEOPLASM OF OVERLAPPING SITES OF LEFT BREAST IN FEMALE, ESTROGEN RECEPTOR POSITIVE (MULTI): ICD-10-CM

## 2024-07-22 DIAGNOSIS — C50.412 MALIGNANT NEOPLASM OF UPPER-OUTER QUADRANT OF LEFT BREAST IN FEMALE, ESTROGEN RECEPTOR NEGATIVE (MULTI): ICD-10-CM

## 2024-07-22 DIAGNOSIS — G89.18 POST-OP PAIN: ICD-10-CM

## 2024-07-22 LAB
LABORATORY COMMENT REPORT: NORMAL
PATH REPORT.FINAL DX SPEC: NORMAL
PATH REPORT.GROSS SPEC: NORMAL
PATH REPORT.RELEVANT HX SPEC: NORMAL
PATH REPORT.TOTAL CANCER: NORMAL
PREGNANCY TEST URINE, POC: NEGATIVE

## 2024-07-22 PROCEDURE — 7100000001 HC RECOVERY ROOM TIME - INITIAL BASE CHARGE: Performed by: SURGERY

## 2024-07-22 PROCEDURE — 2500000001 HC RX 250 WO HCPCS SELF ADMINISTERED DRUGS (ALT 637 FOR MEDICARE OP): Performed by: SURGERY

## 2024-07-22 PROCEDURE — 2500000004 HC RX 250 GENERAL PHARMACY W/ HCPCS (ALT 636 FOR OP/ED): Performed by: PHYSICIAN ASSISTANT

## 2024-07-22 PROCEDURE — 2780000003 HC OR 278 NO HCPCS: Performed by: SURGERY

## 2024-07-22 PROCEDURE — 2500000004 HC RX 250 GENERAL PHARMACY W/ HCPCS (ALT 636 FOR OP/ED): Performed by: SURGERY

## 2024-07-22 PROCEDURE — 3600000008 HC OR TIME - EACH INCREMENTAL 1 MINUTE - PROCEDURE LEVEL THREE: Performed by: SURGERY

## 2024-07-22 PROCEDURE — C1789 PROSTHESIS, BREAST, IMP: HCPCS | Performed by: SURGERY

## 2024-07-22 PROCEDURE — 2500000004 HC RX 250 GENERAL PHARMACY W/ HCPCS (ALT 636 FOR OP/ED): Performed by: PHARMACIST

## 2024-07-22 PROCEDURE — 3700000002 HC GENERAL ANESTHESIA TIME - EACH INCREMENTAL 1 MINUTE: Performed by: SURGERY

## 2024-07-22 PROCEDURE — 3700000001 HC GENERAL ANESTHESIA TIME - INITIAL BASE CHARGE: Performed by: SURGERY

## 2024-07-22 PROCEDURE — 2720000007 HC OR 272 NO HCPCS: Performed by: SURGERY

## 2024-07-22 PROCEDURE — 2500000005 HC RX 250 GENERAL PHARMACY W/O HCPCS: Performed by: NURSE ANESTHETIST, CERTIFIED REGISTERED

## 2024-07-22 PROCEDURE — 11970 RPLCMT TISS XPNDR PERM IMPLT: CPT | Performed by: SURGERY

## 2024-07-22 PROCEDURE — 2500000005 HC RX 250 GENERAL PHARMACY W/O HCPCS: Performed by: ANESTHESIOLOGY

## 2024-07-22 PROCEDURE — 2500000004 HC RX 250 GENERAL PHARMACY W/ HCPCS (ALT 636 FOR OP/ED): Performed by: NURSE ANESTHETIST, CERTIFIED REGISTERED

## 2024-07-22 PROCEDURE — 2500000005 HC RX 250 GENERAL PHARMACY W/O HCPCS: Performed by: SURGERY

## 2024-07-22 PROCEDURE — 7100000002 HC RECOVERY ROOM TIME - EACH INCREMENTAL 1 MINUTE: Performed by: SURGERY

## 2024-07-22 PROCEDURE — 15772 GRFG AUTOL FAT LIPO EA ADDL: CPT | Performed by: SURGERY

## 2024-07-22 PROCEDURE — 7100000010 HC PHASE TWO TIME - EACH INCREMENTAL 1 MINUTE: Performed by: SURGERY

## 2024-07-22 PROCEDURE — 15771 GRFG AUTOL FAT LIPO 50 CC/<: CPT | Performed by: SURGERY

## 2024-07-22 PROCEDURE — 2500000004 HC RX 250 GENERAL PHARMACY W/ HCPCS (ALT 636 FOR OP/ED): Performed by: ANESTHESIOLOGY

## 2024-07-22 PROCEDURE — 2500000005 HC RX 250 GENERAL PHARMACY W/O HCPCS: Performed by: PHARMACIST

## 2024-07-22 PROCEDURE — 81025 URINE PREGNANCY TEST: CPT | Performed by: ANESTHESIOLOGY

## 2024-07-22 PROCEDURE — 2500000005 HC RX 250 GENERAL PHARMACY W/O HCPCS: Performed by: PHYSICIAN ASSISTANT

## 2024-07-22 PROCEDURE — 19380 REVJ RECONSTRUCTED BREAST: CPT | Performed by: SURGERY

## 2024-07-22 PROCEDURE — 2500000001 HC RX 250 WO HCPCS SELF ADMINISTERED DRUGS (ALT 637 FOR MEDICARE OP): Performed by: ANESTHESIOLOGY

## 2024-07-22 PROCEDURE — 7100000009 HC PHASE TWO TIME - INITIAL BASE CHARGE: Performed by: SURGERY

## 2024-07-22 PROCEDURE — 88300 SURGICAL PATH GROSS: CPT | Mod: TC | Performed by: SURGERY

## 2024-07-22 PROCEDURE — 3600000003 HC OR TIME - INITIAL BASE CHARGE - PROCEDURE LEVEL THREE: Performed by: SURGERY

## 2024-07-22 DEVICE — IMPLANTABLE DEVICE: Type: IMPLANTABLE DEVICE | Site: BREAST | Status: FUNCTIONAL

## 2024-07-22 RX ORDER — PROPOFOL 10 MG/ML
INJECTION, EMULSION INTRAVENOUS AS NEEDED
Status: DISCONTINUED | OUTPATIENT
Start: 2024-07-22 | End: 2024-07-22

## 2024-07-22 RX ORDER — KETOROLAC TROMETHAMINE 30 MG/ML
INJECTION, SOLUTION INTRAMUSCULAR; INTRAVENOUS AS NEEDED
Status: DISCONTINUED | OUTPATIENT
Start: 2024-07-22 | End: 2024-07-22

## 2024-07-22 RX ORDER — CELECOXIB 200 MG/1
200 CAPSULE ORAL 2 TIMES DAILY
Qty: 28 CAPSULE | Refills: 0 | Status: SHIPPED | OUTPATIENT
Start: 2024-07-22 | End: 2024-08-05

## 2024-07-22 RX ORDER — DROPERIDOL 2.5 MG/ML
0.62 INJECTION, SOLUTION INTRAMUSCULAR; INTRAVENOUS ONCE AS NEEDED
Status: DISCONTINUED | OUTPATIENT
Start: 2024-07-22 | End: 2024-07-22 | Stop reason: HOSPADM

## 2024-07-22 RX ORDER — SCOLOPAMINE TRANSDERMAL SYSTEM 1 MG/1
1 PATCH, EXTENDED RELEASE TRANSDERMAL ONCE
Status: DISCONTINUED | OUTPATIENT
Start: 2024-07-22 | End: 2024-07-22 | Stop reason: HOSPADM

## 2024-07-22 RX ORDER — SODIUM CHLORIDE, SODIUM LACTATE, POTASSIUM CHLORIDE, CALCIUM CHLORIDE 600; 310; 30; 20 MG/100ML; MG/100ML; MG/100ML; MG/100ML
100 INJECTION, SOLUTION INTRAVENOUS CONTINUOUS
Status: DISCONTINUED | OUTPATIENT
Start: 2024-07-22 | End: 2024-07-22 | Stop reason: HOSPADM

## 2024-07-22 RX ORDER — ALBUTEROL SULFATE 0.83 MG/ML
2.5 SOLUTION RESPIRATORY (INHALATION) ONCE AS NEEDED
Status: DISCONTINUED | OUTPATIENT
Start: 2024-07-22 | End: 2024-07-22 | Stop reason: HOSPADM

## 2024-07-22 RX ORDER — MIDAZOLAM HYDROCHLORIDE 1 MG/ML
INJECTION INTRAMUSCULAR; INTRAVENOUS AS NEEDED
Status: DISCONTINUED | OUTPATIENT
Start: 2024-07-22 | End: 2024-07-22

## 2024-07-22 RX ORDER — ACETAMINOPHEN 325 MG/1
650 TABLET ORAL EVERY 4 HOURS PRN
Status: DISCONTINUED | OUTPATIENT
Start: 2024-07-22 | End: 2024-07-22 | Stop reason: HOSPADM

## 2024-07-22 RX ORDER — LIDOCAINE HYDROCHLORIDE 10 MG/ML
0.1 INJECTION, SOLUTION EPIDURAL; INFILTRATION; INTRACAUDAL; PERINEURAL ONCE
Status: DISCONTINUED | OUTPATIENT
Start: 2024-07-22 | End: 2024-07-22 | Stop reason: HOSPADM

## 2024-07-22 RX ORDER — BACITRACIN ZINC 500 UNIT/G
OINTMENT IN PACKET (EA) TOPICAL AS NEEDED
Status: DISCONTINUED | OUTPATIENT
Start: 2024-07-22 | End: 2024-07-22 | Stop reason: HOSPADM

## 2024-07-22 RX ORDER — LIDOCAINE HYDROCHLORIDE 20 MG/ML
INJECTION, SOLUTION EPIDURAL; INFILTRATION; INTRACAUDAL; PERINEURAL AS NEEDED
Status: DISCONTINUED | OUTPATIENT
Start: 2024-07-22 | End: 2024-07-22

## 2024-07-22 RX ORDER — ONDANSETRON HYDROCHLORIDE 2 MG/ML
INJECTION, SOLUTION INTRAVENOUS AS NEEDED
Status: DISCONTINUED | OUTPATIENT
Start: 2024-07-22 | End: 2024-07-22

## 2024-07-22 RX ORDER — ACETAMINOPHEN 500 MG
1000 TABLET ORAL EVERY 8 HOURS
Qty: 84 TABLET | Refills: 0 | Status: SHIPPED | OUTPATIENT
Start: 2024-07-22 | End: 2024-08-05

## 2024-07-22 RX ORDER — IPRATROPIUM BROMIDE 0.5 MG/2.5ML
500 SOLUTION RESPIRATORY (INHALATION) ONCE
Status: DISCONTINUED | OUTPATIENT
Start: 2024-07-22 | End: 2024-07-22 | Stop reason: HOSPADM

## 2024-07-22 RX ORDER — FENTANYL CITRATE 50 UG/ML
INJECTION, SOLUTION INTRAMUSCULAR; INTRAVENOUS AS NEEDED
Status: DISCONTINUED | OUTPATIENT
Start: 2024-07-22 | End: 2024-07-22

## 2024-07-22 RX ORDER — CEFAZOLIN 1 G/1
INJECTION, POWDER, FOR SOLUTION INTRAVENOUS AS NEEDED
Status: DISCONTINUED | OUTPATIENT
Start: 2024-07-22 | End: 2024-07-22

## 2024-07-22 RX ORDER — ROCURONIUM BROMIDE 10 MG/ML
INJECTION, SOLUTION INTRAVENOUS AS NEEDED
Status: DISCONTINUED | OUTPATIENT
Start: 2024-07-22 | End: 2024-07-22

## 2024-07-22 RX ORDER — OXYCODONE HYDROCHLORIDE 5 MG/1
5 TABLET ORAL EVERY 4 HOURS PRN
Status: DISCONTINUED | OUTPATIENT
Start: 2024-07-22 | End: 2024-07-22 | Stop reason: HOSPADM

## 2024-07-22 RX ORDER — ONDANSETRON HYDROCHLORIDE 2 MG/ML
4 INJECTION, SOLUTION INTRAVENOUS ONCE AS NEEDED
Status: COMPLETED | OUTPATIENT
Start: 2024-07-22 | End: 2024-07-22

## 2024-07-22 RX ORDER — SULFAMETHOXAZOLE AND TRIMETHOPRIM 800; 160 MG/1; MG/1
1 TABLET ORAL 2 TIMES DAILY
Qty: 20 TABLET | Refills: 0 | Status: SHIPPED | OUTPATIENT
Start: 2024-07-22 | End: 2024-08-01

## 2024-07-22 RX ORDER — GABAPENTIN 300 MG/1
600 CAPSULE ORAL ONCE
Status: COMPLETED | OUTPATIENT
Start: 2024-07-22 | End: 2024-07-22

## 2024-07-22 ASSESSMENT — PAIN - FUNCTIONAL ASSESSMENT
PAIN_FUNCTIONAL_ASSESSMENT: 0-10
PAIN_FUNCTIONAL_ASSESSMENT: UNABLE TO SELF-REPORT
PAIN_FUNCTIONAL_ASSESSMENT: 0-10
PAIN_FUNCTIONAL_ASSESSMENT: UNABLE TO SELF-REPORT
PAIN_FUNCTIONAL_ASSESSMENT: 0-10

## 2024-07-22 ASSESSMENT — PAIN DESCRIPTION - LOCATION: LOCATION: BREAST

## 2024-07-22 ASSESSMENT — PAIN SCALES - GENERAL
PAINLEVEL_OUTOF10: 0 - NO PAIN
PAINLEVEL_OUTOF10: 4
PAINLEVEL_OUTOF10: 3
PAINLEVEL_OUTOF10: 4
PAINLEVEL_OUTOF10: 3
PAINLEVEL_OUTOF10: 4
PAINLEVEL_OUTOF10: 0 - NO PAIN

## 2024-07-22 ASSESSMENT — PAIN DESCRIPTION - DESCRIPTORS: DESCRIPTORS: SORE

## 2024-07-22 ASSESSMENT — COLUMBIA-SUICIDE SEVERITY RATING SCALE - C-SSRS
2. HAVE YOU ACTUALLY HAD ANY THOUGHTS OF KILLING YOURSELF?: NO
6. HAVE YOU EVER DONE ANYTHING, STARTED TO DO ANYTHING, OR PREPARED TO DO ANYTHING TO END YOUR LIFE?: NO
1. IN THE PAST MONTH, HAVE YOU WISHED YOU WERE DEAD OR WISHED YOU COULD GO TO SLEEP AND NOT WAKE UP?: NO

## 2024-07-22 ASSESSMENT — PAIN DESCRIPTION - ORIENTATION: ORIENTATION: LEFT

## 2024-07-22 NOTE — ANESTHESIA PROCEDURE NOTES
Airway  Date/Time: 7/22/2024 7:55 AM  Urgency: elective    Airway not difficult    Staffing  Performed: CRNA   Authorized by: Ian Tesfaye MD    Performed by: CARMELITA Moran-JASON  Patient location during procedure: OR    Indications and Patient Condition  Indications for airway management: anesthesia  Spontaneous Ventilation: absent  Sedation level: deep  Patient position: sniffing  MILS maintained throughout  Mask difficulty assessment: 1 - vent by mask    Final Airway Details  Final airway type: endotracheal airway      Successful airway: ETT  Cuffed: yes   Successful intubation technique: direct laryngoscopy  Facilitating devices/methods: intubating stylet  Endotracheal tube insertion site: oral  Blade: Nick  Blade size: #3  ETT size (mm): 7.0  Cormack-Lehane Classification: grade I - full view of glottis  Placement verified by: capnometry   Measured from: teeth  ETT to teeth (cm): 22  Number of attempts at approach: 1

## 2024-07-22 NOTE — BRIEF OP NOTE
Date: 2024  OR Location: [facility] OR    Name: Iris Barth, : 1980, Age: 43 y.o., MRN: [id number], Sex: female    Diagnosis  Pre-op Diagnosis      * Malignant neoplasm of overlapping sites of left breast in female, estrogen receptor positive (Multi) [C50.812, Z17.0]     * Breast asymmetry following reconstructive surgery [N65.1] Post-op Diagnosis     * Malignant neoplasm of overlapping sites of left breast in female, estrogen receptor positive (Multi) [C50.812, Z17.0]     * Breast asymmetry following reconstructive surgery [N65.1]     Procedures  Left Breast Tissue Expander Removal  52743 - LA REPLACEMENT TISSUE EXPANDER W/PERMANENT IMPLANT    Adipose Tissue Insertion  75596 - LA GRAFTING OF AUTOLOGOUS FAT BY LIPO 50 CC OR LESS      Surgeons      * Ayah Edgar - Primary    Resident/Fellow/Other Assistant:  Surgeons and Role:     * Andreina Padgett PA-C - ISAURO First Assist    Procedure Summary  Anesthesia: General  ASA: II  Anesthesia Staff: Anesthesiologist: Ian Tesfaye MD  CRNA: Marilee Hutchison, APRN-CRNA; CARMELITA Reinoso-CRNA  Estimated Blood Loss: 25mL  Intra-op Medications:   Administrations occurring from 0730 to 1130 on 24:   Medication Name Total Dose   nitroglycerin (Marciano-Bid) 2 % ointment 1 inch   acetaminophen (Tylenol) suppository 975 mg   EPINEPHrine (Adrenalin) 1 mg, lidocaine (Xylocaine) 20 mg/mL (2 %) 25 mL, sodium bicarbonate 2.5 mEq, tranexamic acid (Cyklokapron) 1,000 mg in lactated Ringer's 1,000 mL irrigation 1,000 mL   EPINEPHrine (Adrenalin) 1 mg, lidocaine (Xylocaine) 20 mg/mL (2 %) 25 mL, sodium bicarbonate 2.5 mEq, tranexamic acid (Cyklokapron) 1,000 mg in lactated Ringer's 1,000 mL irrigation 1,000 mL   bacitracin ointment 1 Application   BUPivacaine-EPINEPHrine (Marcaine w/EPI) 46 mL in sodium chloride 0.9% 80 mL syringe 126 mL              Anesthesia Record               Intraprocedure I/O Totals          Intake    lactated Ringer's infusion 1000.00 mL    Total  Intake 1000 mL          Specimen:   ID Type Source Tests Collected by Time   1 : LEFT BREAST TISSUE EXPANDER FOR GROSS ONLY Tissue BREAST IMPLANT LEFT SURGICAL PATHOLOGY EXAM Ayah Edgar MD 7/22/2024 0900        Staff:   Circulator: Iris Singleton Person: Sara Mayer Scrub: Antonia          Findings: surgical absence of right breast with scarring from prior procedures, left sided tissue expander removed with capsule intact    Complications:  None; patient tolerated the procedure well.     Disposition: PACU - hemodynamically stable.  Condition: stable  Specimens Collected:   ID Type Source Tests Collected by Time   1 : LEFT BREAST TISSUE EXPANDER FOR GROSS ONLY Tissue BREAST IMPLANT LEFT SURGICAL PATHOLOGY EXAM Ayah Edgar MD 7/22/2024 0900     Attending Attestation:     Ayah Edgar  Phone Number: [phone]

## 2024-07-22 NOTE — DISCHARGE INSTRUCTIONS
Plastic and Reconstructive Surgery Post Operative Instructions  You had surgery today at [facility] for  with Dr. Edgar of plastic and reconstructive surgery. Included below are post-operative instructions and details regarding follow-up.     Thank you for allowing us to participate in your care and we wish you the best!    Best Regards,  [facility]  Department of Plastic and Reconstructive Surgery    Activity:  Activity as tolerated but please avoid strenuous activity. Start walking short distances as soon as possible, as this helps to reduce swelling and lowers the chance of blood clots . No pushing, pulling or lifting objects greater than 5 pounds. Please wear your abdominal binder and or compressive garments.     You may not shower until cleared by your surgeon. Avoid soaking or submerging surgical incisions/sites.    Please hold your Tamoxifen until cleared to resume by Dr. Edgar and do not take your Wegovy for 1 more week.     Surgical Site/Wound Care:  wound care recs    Please leave Ace Bandages around your breasts until your follow up appointment in clinic. You may exchange the Abdominal binder with your personal compression dressing when able.     If you experience any issues with your  dressing  please refer to the provided instruction manual or contact the plastic surgery office at [phone].        Avoid application of creams, lotions or ointments to surgical site, no soaking or scrubbing of surgical sites.     Please do not apply ice or heat directly to the skin as feeling to the area may be diminished.    Please notify our office immediately if developing signs of infection which include increased redness, swelling, fever/chills, green/yellow drainage, or foul odor from wound. Plastic Surgery office line: [phone].      3 weeks  after surgery you may begin to massage your incisions with body lotion, BioOil, or scar cream. Do not use 100% vitamin  E as it can cause skin irritation.    Avoid exposing scars to sun for at least 12 months. Always use a strong sunblock if sun exposure is unavoidable (SPF 30 or greater).      Nutrition:  You may resume a regular diet following surgery and increase your protein. Ensure that you are drinking an adequate amount of fluids to maintain hydration as well as consuming a diet high in protein and low in sugar. You may consider increasing your fiber intake to avoid constipation.    Do not smoke, as smoking delays healing and increases the risk of complications. Also be sure you are not around people that smoke for at least 6 weeks after surgery.  Second hand smoke is just as harmful as if you were to smoke.    Medication Instructions:  You may resume use of your home prescribed mediations as previously directed following discharge from the hospital. If you were taking medications prior to your surgery and they are not listed on your discharge homegoing instructions medications list, consult your MD before you resume these medications.        Some postoperative pain is not unusual. This is usually relieved by taking prescribed or over the counter Extra Strength Tylenol, Celebrex and Gabapentin.  Severe pain despite administration of pain medication must be reported to your physician.    Remember when taking Acetaminophen, do NOT exceed more than 1000 milligrams (mg) per dose or more than 4000 mg total per day. Taking too much Acetaminophen at one time can damage your liver. The maximum amount of ibuprofen in adults is 800 mg per dose or 3200 mg per day. Call your MD if you have any questions about your medications. To prevent constipation while taking narcotic pain medications, please utilize your prescribed bowel regimen, ensure that you drink plenty of water, eat fiber rich foods (a good source is fruit) and increase activity progressively.    DO NOT drive a car while utilizing narcotic pain medications and until cleared by  MD at follow-up appointment. Driving or operating heavy machinery, lawnmowers or power tools while taking opiod/narcotic pain medications may impair your judgement.    Call Physician If:  Call your MD or seek immediate medical attention if you experience any of the following symptoms:  1. Fever of 101.5 (38.5 C) or greater  2. Pain not controlled with prescribed pain medications  3. Uncontrolled nausea and/or vomiting  4. Drainage or swelling around your incisions and/or surgical sites   5. Separation of incisions, or tearing of the incision line  6. Large fluid collection under or around the incision or flap sites   7. Flap discoloration (including darkened appearance)  8. Difficulty breathing  9. Swelling, pain, heat and/or redness in your legs and/or calves  10. Inability to tolerate diet/fluid intake    Contact the plastic surgery office for any questions and/or concerns regarding the surgical incision/site.  1. [phone] if Monday-Friday (8 a.m. - 4:30 p.m.)  2. [phone] and ask for the Plastic Surgeon matty if after hours or on weekends  3. Please send a picture with any wound concerns to our plastic surgery email at [email or web address]    Follow-up/Post Discharge Appointments:  Follow-up care is a key part of your treatment and safety. It is very important that you maintain follow-up care as directed so that your surgical site heals properly and does not lead to problems. Always carry a current medication list with you and bring it to ALL healthcare Provider visits. Be sure to maintain follow up with plastic surgery at your scheduled appointment. If you are unable to keep your appointment, or need to reschedule please contact our office at [phone].

## 2024-07-22 NOTE — ANESTHESIA POSTPROCEDURE EVALUATION
Patient: Iris Barth    Procedure Summary       Date: 07/22/24 Room / Location: [facility] OR 05 / Virtual [facility] A OR    Anesthesia Start: 0744 Anesthesia Stop: 1156    Procedures:       Left Breast Tissue Expander Removal (Left: Breast)      Adipose Tissue Insertion (Bilateral: Breast) Diagnosis:       Malignant neoplasm of overlapping sites of left breast in female, estrogen receptor positive (Multi)      Breast asymmetry following reconstructive surgery      (Malignant neoplasm of overlapping sites of left breast in female, estrogen receptor positive (Multi) [C50.812, Z17.0])      (Breast asymmetry following reconstructive surgery [N65.1])    Surgeons: Ayah Edgar MD Responsible Provider: Ian Tesfaye MD    Anesthesia Type: general ASA Status: 2            Anesthesia Type: general    Vitals Value Taken Time   /73 07/22/24 1315   Temp 37.3 °C (99.1 °F) 07/22/24 1315   Pulse 84 07/22/24 1315   Resp 17 07/22/24 1315   SpO2 100 % 07/22/24 1315       Anesthesia Post Evaluation    Patient location during evaluation: bedside  Patient participation: complete - patient participated  Level of consciousness: awake  Pain management: adequate  Airway patency: patent  Cardiovascular status: acceptable  Respiratory status: acceptable  Hydration status: acceptable  Postoperative Nausea and Vomiting: none        No notable events documented.

## 2024-07-22 NOTE — OP NOTE
Left Breast Tissue Expander Removal (L), Adipose Tissue Insertion (B) Operative Note     Date: 2024  OR Location: [facility]    Name: Iris Barth, : 1980, Age: 43 y.o., MRN: [id number], Sex: female    Diagnosis  Pre-op Diagnosis      * Malignant neoplasm of overlapping sites of left breast in female, estrogen receptor positive (Multi) [C50.812, Z17.0]     * Breast asymmetry following reconstructive surgery [N65.1] Post-op Diagnosis     * Malignant neoplasm of overlapping sites of left breast in female, estrogen receptor positive (Multi) [C50.812, Z17.0]     * Breast asymmetry following reconstructive surgery [N65.1]     Procedures  Left Breast Tissue Expander Removal and placement of implant and significant capsulomy, partial capsulectomy and capsullarhaphy, and revision of mastectomy skin  77324 - ID REPLACEMENT TISSUE EXPANDER W/PERMANENT IMPLANT  70623 Revision of Left reconstructed breast  90133 - Left partial capsulectomy, capsulotomy, and capsullarrhaphy    Adipose Tissue Insertion total 170 cc  13044 - ID GRAFTING OF AUTOLOGOUS FAT BY LIPO 50 CC OR LESS  [id number] x 3    Surgeons      * Ayah Edgar - Primary    Resident/Fellow/Other Assistant:  Surgeons and Role:     * Andreina Padgett PA-C - ISAURO First Assist  Keith Gonzalez MD Gen Surg PGY 4  There was no skilled Plastic Surgery resident available.    Procedure Summary  Anesthesia: General  ASA: II  Anesthesia Staff: Anesthesiologist: Ian Tesfaye MD  CRNA: TORO MoranCRNA; CARMELITA Reinoso-CRNA  Estimated Blood Loss: 50 mL  Intra-op Medications:   Administrations occurring from 0730 to 1130 on 24:   Medication Name Total Dose   nitroglycerin (Marciano-Bid) 2 % ointment 1 inch   acetaminophen (Tylenol) suppository 975 mg   EPINEPHrine (Adrenalin) 1 mg, lidocaine (Xylocaine) 20 mg/mL (2 %) 25 mL, sodium bicarbonate 2.5 mEq, tranexamic acid (Cyklokapron) 1,000 mg in lactated Ringer's 1,000 mL irrigation 1,000 mL   EPINEPHrine  (Adrenalin) 1 mg, lidocaine (Xylocaine) 20 mg/mL (2 %) 25 mL, sodium bicarbonate 2.5 mEq, tranexamic acid (Cyklokapron) 1,000 mg in lactated Ringer's 1,000 mL irrigation 1,000 mL   bacitracin ointment 1 Application   BUPivacaine-EPINEPHrine (Marcaine w/EPI) 46 mL in sodium chloride 0.9% 80 mL syringe 126 mL              Anesthesia Record               Intraprocedure I/O Totals          Intake    lactated Ringer's infusion 1000.00 mL    Total Intake 1000 mL          Specimen:   ID Type Source Tests Collected by Time   1 : LEFT BREAST TISSUE EXPANDER FOR GROSS ONLY Tissue BREAST IMPLANT LEFT SURGICAL PATHOLOGY EXAM Ayah Edgar MD 7/22/2024 0900        Staff:   Circulator: Iris Singleton Person: Sara Mayer Scrub: Antonia      Implants:  Implants       Type Name Action Serial No.      Breast Implant BREAST IMPLANT, SILICONE, SM. ROUND HI PROFILE 375CC - [id number] - [id number] Implanted [id number]                Indications: Iris Barth is an 43 y.o. female who is having surgery for Malignant neoplasm of overlapping sites of left breast in female, estrogen receptor positive (Multi) [C50.812, Z17.0]  Breast asymmetry following reconstructive surgery [N65.1].  She has a history of bilateral mastectomies with a prepectoral tissue expander placed.  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  The necessitated the removal of the right tissue expander with local tissue rearrangement for chest wall coverage.  She is now ready for her second stage of her reconstruction which include replacement of her left tissue expander with permanent implant and fat grafting to the both sides, as the right chest wall skin is very thin and this will restrict the amount of expansion that the skin can tolerate.      The patient was seen in the preoperative area. The risks, benefits, complications, treatment options, non-operative alternatives, expected recovery and outcomes were  discussed with the patient. The risks of the procedure were discussed with her prior to surgery.  These include but are not limited to the risks of anesthesia, infection, bleeding, pain, need for further procedures, hematoma, seroma, wound healing complications, and asymmetry. We also discussed the potential risks for fat grafting that include and are not limited to the risks of resorption, fat necrosis, oil cysts, need for further procedures and/or imaging.The possibilities of reaction to medication, pulmonary aspiration, injury to surrounding structures, bleeding, recurrent infection, the need for additional procedures, failure to diagnose a condition, and creating a complication requiring transfusion or operation were discussed with the patient. The patient concurred with the proposed plan, giving informed consent.  The site of surgery was properly noted/marked if necessary per policy. The patient has been actively warmed in preoperative area. Preoperative antibiotics have been ordered and given within 1 hours of incision. Venous thrombosis prophylaxis have been ordered including bilateral sequential compression devices    Procedure Details: The patient was identified and marked in the upright and standing position.  She was taken to the operative room and placed in the supine position.  A preoperative time was performed identifying the patient, procedeure and laterality.  An atraumatic endotracheal intubation was performed by the anesthesia team.  Her arms were padded and carefully secured to the arm boards, abducted less than 90 degrees. She was prepped and draped in the usual sterile fashion.     I first prepared for fat graft harvesting by placing tumescence in the donor sites.  These were her bilateral flanks as well as the superior anterior abdominal wall and her bilateral flanks.   The tumescence consisted of 25 cc of 2% lidocaine, 1 cc of epinephrine, 1 g of TXA, and 2.5 cc of sodium bicarb in a L of LR.  A total of 2 L of tumescence was used.      While the tumescent took effect I turned my attention to the left breast.  The inframammary fold incision was made once more the following dissection was carried down through the subcutaneous tissue to the posterior capsule and dissected superiorly approximately 3 cm.  Then the capsule was opened and the expander was removed.  Now we now had a inferior flap of capsule and subcutaneous tissue approximately 3 cm.  I used a variety of gel sizers on the side and that a 400 cc smooth round high-profile sizer would give her the appropriate volume and size for her chest wall width.  The left implant however was sitting low on her chest and I wanted to raise her inframammary fold.  And in doing that I admitted to then raised her superior capsule.  I therefore performed a  capsulotomy from 1032 and 3:00 on the superior current of the capsule.  I dissected this capsule off the chest wall and then continued the dissection of this over her pectoralis raising the superior mastectomy flap.  This dissection continued for approximately 6 cm which would allow for better drape of the superior pole tissues.  I then was able to take the capsule that had previously been on the posterior portion of her pocket and let it unfurl and continue up the superior pole to allow for better transition of the chest wall to implant.  In order to raise her inframammary fold approximately 2.5 cm I performed a crescentic capsulectomy of the lower pole of her posterior capsule to create a fresh raw surface in order to improve healing.  I then used a combination of a Vicryl in a PDS to raise the superior abdominal tissue and advanced it to create an inframammary fold that was higher I secured the Adela's layer to the chest wall and the edge of the capsule.  I addressed transverse access of her mastectomy skin flap by imbricating it along her previous vertical incision.  I then sharply de-epithelialized this  skin with a 10 blade and closed it in multiple layers using 3-0 Vicryl in the dermis and as interrupted buried dermals followed by a running 4-0 Monocryl.  This area of revision of the skin pocket measured 9 cm x 4.5 cm.    Next I harvested the fat graft. Using suction lipectomy, I harvested fat from the donor sites. The fat was then filtrated using the PureGraft system and washed with Normal Saline.  The fat was then placed in microaliquots using Bhatti catheters in a three dimensional lattice.  On the right side 10 cc of fat graft was placed in the superior pole, 20 cc was placed in the lateral pole,  30  cc was placed in the superior medial pole, 30 ml of graft was placed in the central pole and 20 ml was placed in the medial pole for a total of 110 ml on the right side.  On the left,   20  cc of fat graft was placed in the superior pole,   20 cc was placed in the superior medial pole, and 20 ml was placed in the medial pole for a total of 60 ml on the left.  A Total of 170 ml of graft was placed in whole.  The fat grafting access sites were freshened with sharp excision of skin and subcutaneous fat.  The incisions were closed with 3-0 Monocryl interrupted buried dermals followed by running 4 Monocryl's  I capsule which was then opened and the expander was removed.      46 cc of 0.5% Marcaine with epinephrine was diluted with 80 cc of normal saline which was used to place blocks in both chest walls, flanks and superior abdomin for post operative pain control.  I removed the sizer on the left  side.  I copiously irrigated the pocket with normal saline to make sure any free fat graft was removed and I once more ensured hemostasis.  I washed the pocket with a bottle of Irrisept.  I then exchanged gloves and prepped the left side with Betadine paint and sterile blue towels.  I opened up a 400 cc [place] smooth round high profile implant and bathed it with Irrisept.  I used a Painter funnel to place it into the pocket  on the left side.  I reinforced the new inframammary fold by performing a capsulorrhaphy with the inferior 3 cm flap of soft tissue and posterior capsule that I had raised upon entering the tissue expander.  I used this to draped over the inferior pole of the implant and secured it to the raised inframammary fold that I created using the sizer.  I closed the incision with 3-0 Monocryl in the parenchyma as well as placed as interrupted buried dermals followed by running 4-0 Monocryl subcuticular.    The incisions were dressed with Prineo. The donor sites were then dressed with foam followed by a 6 inch ortho ACE wrap and abdominal binder.  The patient was then woken, extubated and taken to the recovery room in good condition.    Complications:  None; patient tolerated the procedure well.    Disposition: PACU - hemodynamically stable.  Condition: stable     Attending Attestation: I performed the procedure.    Ayah Edgar  Phone Number: [phone]

## 2024-07-26 ENCOUNTER — TELEPHONE (OUTPATIENT)
Dept: PLASTIC SURGERY | Facility: CLINIC | Age: 44
End: 2024-07-26
Payer: COMMERCIAL

## 2024-07-26 NOTE — TELEPHONE ENCOUNTER
Called patient to follow-up after surgery. She had questions about the foam padding around her abdomen. She stated it was extremely hot and making it hard to sleep at night. I told her she could take it off and put her compression garment on in place of the foam padding. Her pain is controlled with currently medication regimen. She was given after-hours number to call if she needs anything over the weekend.

## 2024-07-29 NOTE — PROGRESS NOTES
PLASTIC SURGERY CLINIC VISIT  POSTOP BREAST RECONSTRUCTION     Date: 7/30/24   Date of Surgery: 7/22/24  Surgical Procedure: Left breast tissue expander exchange for implant with bilateral breast fat grafting        HPI:   Iris Barth 43 y.o. female is here for post-operative appointment for the above procedure(s).      Interval changes as of this date:   7/30 Doing well overall. She contacted office on 7/26/24 with questions about foam padding in abdomen. She is wearing compression garment. Minimal pain.        MEDICATIONS    Current Outpatient Medications:     acetaminophen (Tylenol Extra Strength) 500 mg tablet, Take 2 tablets (1,000 mg) by mouth every 8 hours for 14 days., Disp: 84 tablet, Rfl: 0    celecoxib (CeleBREX) 200 mg capsule, Take 1 capsule (200 mg) by mouth 2 times a day for 14 days., Disp: 28 capsule, Rfl: 0    cetirizine (ZyrTEC) 10 mg tablet, Take 1 tablet (10 mg) by mouth once daily as needed for allergies., Disp: , Rfl:     cholecalciferol (Vitamin D-3) 25 MCG (1000 UT) tablet, Take 1 tablet (25 mcg) by mouth once daily., Disp: , Rfl:     cyanocobalamin, vitamin B-12, (VITAMIN B-12 ORAL), Take 1,000 mcg by mouth once daily., Disp: , Rfl:     fluticasone (Flonase) 50 mcg/actuation nasal spray, Administer 1 spray into each nostril once daily as needed for rhinitis or allergies. Shake gently. Before first use, prime pump. After use, clean tip and replace cap., Disp: , Rfl:     gabapentin (Neurontin) 300 mg capsule, Take 1 capsule (300 mg) by mouth every 8 hours for 14 days., Disp: 42 capsule, Rfl: 0    LORazepam (Ativan) 0.5 mg tablet, Take 2 tablets (1 mg) by mouth 3 times a day as needed (anxiety)., Disp: , Rfl:     multivitamin-children's (Cerovite, Jr) chewable tablet, Chew 2 tablets once daily. Flintstones chewable, Disp: , Rfl:     semaglutide, weight loss, (Wegovy) 2.4 mg/0.75 mL pen injector, Inject 2.4 mg under the skin every 7 days., Disp: 9 mL, Rfl: 3     "sulfamethoxazole-trimethoprim (Bactrim DS) 800-160 mg tablet, Take 1 tablet by mouth 2 times a day for 10 days., Disp: 20 tablet, Rfl: 0    zinc acetate 50 mg (zinc) capsule, Take 1 tablet by mouth once daily., Disp: , Rfl:       OBJECTIVE [x]Expand by Default  Blood pressure 114/82, pulse 104, height 1.676 m (5' 6\"), weight 78 kg (172 lb).     REVIEW OF SYSTEMS:    Constitutional: negative for fevers, chills, unintentional weight loss  HEENT: negative for changes in vision, headaches, changes in hearing, congestion, sore throat  Cardiovascular: negative for chest pain, palpitations  Respiratory: negative for cough, shortness of breath  Gastrointestinal: negative for nausea, vomiting, diarrhea  Genitourinary: negative for dysuria, hematuria  Musculoskeletal: negative for joint swelling or pain  Skin: negative for rashes or lesions  Psych: negative for depression, anxiety  Endocrine: negative for polyuria, polydipsia, cold/heat intolerance  Hem/Lymph: negative for bleeding disorder     PHYSICAL EXAM  General: alert and oriented, no apparent distress    Focused exam of the breasts:  Left: incision C/D/I.   Donor sites and fat grafting area well healing, mild bruising posterior right hip.      ASSESSMENT/PLAN  Iris Barth 43 y.o. female who had Left breast tissue expander exchange for implant with bilateral breast fat grafting on 7/22/24 who presents for POV.     Place binder and Spanx.   She is on maintenance Wegovi. Will discuss with PCP possibly weaning back on since she has been off this for 3 weeks.   Continue increased protein intake and activity restriction.     RTC 1 week.     Scribe Attestation  By signing my name below, Kaylah BOOTH Scribe   attest that this documentation has been prepared under the direction and in the presence of Ayah Edgar MD. Obtained verbal consent for scribe from patient.     Attending Attestation:  Ayah BOOTH MD, personal performed the history, exam, and decision " making on this patient.

## 2024-07-30 ENCOUNTER — APPOINTMENT (OUTPATIENT)
Dept: PLASTIC SURGERY | Facility: CLINIC | Age: 44
End: 2024-07-30
Payer: COMMERCIAL

## 2024-07-30 VITALS
HEIGHT: 66 IN | HEART RATE: 104 BPM | SYSTOLIC BLOOD PRESSURE: 114 MMHG | WEIGHT: 172 LBS | DIASTOLIC BLOOD PRESSURE: 82 MMHG | BODY MASS INDEX: 27.64 KG/M2

## 2024-07-30 DIAGNOSIS — N65.1 BREAST ASYMMETRY FOLLOWING RECONSTRUCTIVE SURGERY: Primary | ICD-10-CM

## 2024-07-30 PROCEDURE — 3008F BODY MASS INDEX DOCD: CPT | Performed by: SURGERY

## 2024-07-30 PROCEDURE — 99024 POSTOP FOLLOW-UP VISIT: CPT | Performed by: SURGERY

## 2024-08-05 ENCOUNTER — OFFICE VISIT (OUTPATIENT)
Dept: HEMATOLOGY/ONCOLOGY | Facility: CLINIC | Age: 44
End: 2024-08-05
Payer: COMMERCIAL

## 2024-08-05 ENCOUNTER — TELEPHONE (OUTPATIENT)
Dept: PLASTIC SURGERY | Facility: CLINIC | Age: 44
End: 2024-08-05

## 2024-08-05 VITALS
OXYGEN SATURATION: 99 % | DIASTOLIC BLOOD PRESSURE: 76 MMHG | SYSTOLIC BLOOD PRESSURE: 120 MMHG | TEMPERATURE: 97.7 F | HEART RATE: 92 BPM | RESPIRATION RATE: 16 BRPM

## 2024-08-05 DIAGNOSIS — Z17.0 MALIGNANT NEOPLASM OF OVERLAPPING SITES OF LEFT BREAST IN FEMALE, ESTROGEN RECEPTOR POSITIVE (MULTI): ICD-10-CM

## 2024-08-05 DIAGNOSIS — C50.812 MALIGNANT NEOPLASM OF OVERLAPPING SITES OF LEFT BREAST IN FEMALE, ESTROGEN RECEPTOR POSITIVE (MULTI): ICD-10-CM

## 2024-08-05 PROCEDURE — 99215 OFFICE O/P EST HI 40 MIN: CPT | Performed by: STUDENT IN AN ORGANIZED HEALTH CARE EDUCATION/TRAINING PROGRAM

## 2024-08-05 ASSESSMENT — PAIN SCALES - GENERAL: PAINLEVEL: 5

## 2024-08-05 NOTE — PROGRESS NOTES
FUV completed. Pt tolerating Tamoxifen RX without issues at this time. Sched. For next FUV with Dr Sebastian in 6 months.Pt verbalizes understanding of POC and is agreeable. Pt will call for any new issues or concerns.

## 2024-08-05 NOTE — TELEPHONE ENCOUNTER
Returned call re: medications. Okay to wean off gabapentin and only take at bedtime. Continue to rotate tylenol and ibuprofen for mild pain and discomfort. All questions answered. Call if you need anything else.

## 2024-08-07 NOTE — PROGRESS NOTES
PLASTIC SURGERY CLINIC VISIT  POSTOP BREAST RECONSTRUCTION     Date: 8/13/24   Date of Surgery: 7/22/24  Surgical Procedure: Left breast tissue expander exchange for implant with bilateral breast fat grafting        HPI:   Iris Barth 43 y.o. female is here for post-operative appointment for the above procedure(s).      Interval changes as of this date:   7/30 Doing well overall. She contacted office on 7/26/24 with questions about foam padding in abdomen. She is wearing compression garment. Minimal pain.   8/13/24 Doing well overall. Wearing compression binder.  She reports that she is still sore at her donor sites from her fat graft harvesting specifically bilateral flanks.  She is wondering if she needs to continue to wear her binder.     MEDICATIONS    Current Outpatient Medications:     cetirizine (ZyrTEC) 10 mg tablet, Take 1 tablet (10 mg) by mouth once daily as needed for allergies., Disp: , Rfl:     cholecalciferol (Vitamin D-3) 25 MCG (1000 UT) tablet, Take 1 tablet (25 mcg) by mouth once daily., Disp: , Rfl:     cyanocobalamin, vitamin B-12, (VITAMIN B-12 ORAL), Take 1,000 mcg by mouth once daily., Disp: , Rfl:     fluticasone (Flonase) 50 mcg/actuation nasal spray, Administer 1 spray into each nostril once daily as needed for rhinitis or allergies. Shake gently. Before first use, prime pump. After use, clean tip and replace cap., Disp: , Rfl:     gabapentin (Neurontin) 300 mg capsule, Take 1 capsule (300 mg) by mouth every 8 hours for 14 days., Disp: 42 capsule, Rfl: 0    LORazepam (Ativan) 0.5 mg tablet, Take 2 tablets (1 mg) by mouth 3 times a day as needed (anxiety)., Disp: , Rfl:     multivitamin-children's (Cerovite, Jr) chewable tablet, Chew 2 tablets once daily. [place]stones chewable, Disp: , Rfl:     semaglutide, weight loss, (Wegovy) 2.4 mg/0.75 mL pen injector, Inject 2.4 mg under the skin every 7 days., Disp: 9 mL, Rfl: 3    zinc acetate 50 mg (zinc) capsule, Take 1 tablet by mouth  "once daily., Disp: , Rfl:       OBJECTIVE [x]Expand by Default  Blood pressure 116/88, pulse 94, height 1.676 m (5' 6\").       REVIEW OF SYSTEMS:    Constitutional: negative for fevers, chills, unintentional weight loss  HEENT: negative for changes in vision, headaches, changes in hearing, congestion, sore throat  Cardiovascular: negative for chest pain, palpitations  Respiratory: negative for cough, shortness of breath  Gastrointestinal: negative for nausea, vomiting, diarrhea  Genitourinary: negative for dysuria, hematuria  Musculoskeletal: negative for joint swelling or pain  Skin: negative for rashes or lesions  Psych: negative for depression, anxiety  Endocrine: negative for polyuria, polydipsia, cold/heat intolerance  Hem/Lymph: negative for bleeding disorder     PHYSICAL EXAM  General: alert and oriented, no apparent distress    Focused exam of the breasts:  Left: incision well healed, fat graft on left is good, no swelling. Left breast with some ptosis of skin, rippling on superior pole. No swelling.    Donor sites and fat grafting area well healed. Some edema and soreness on flank.      ASSESSMENT/PLAN  Iris Barth 43 y.o. female who had Left breast tissue expander exchange for implant with bilateral breast fat grafting on 7/22/24 who presents for POV.     Continue massage to decrease swelling.   Bilateral fat grafting on 12/12/24. will plan for fat grafting bilateral chest, revision of left breast to do some further tightening of her envelope which is causing rippling. 11/26/24 date for preoperative visit. Will need photos at this visit.    Follow up call for September, 3 weeks to see how she is doing.    Scribe Attestation  By signing my name below, Kaylah BOOTH Scribe   attest that this documentation has been prepared under the direction and in the presence of Ayah Edgar MD. Obtained verbal consent for scribe from patient.     Attending Attestation:  Ayah BOOTH MD, personal " Nano Rajan MD performed the history, exam, and decision making on this patient.

## 2024-08-13 ENCOUNTER — APPOINTMENT (OUTPATIENT)
Dept: PLASTIC SURGERY | Facility: CLINIC | Age: 44
End: 2024-08-13
Payer: COMMERCIAL

## 2024-08-13 VITALS
HEART RATE: 94 BPM | DIASTOLIC BLOOD PRESSURE: 88 MMHG | SYSTOLIC BLOOD PRESSURE: 116 MMHG | BODY MASS INDEX: 27.76 KG/M2 | HEIGHT: 66 IN

## 2024-08-13 DIAGNOSIS — N65.1 BREAST ASYMMETRY FOLLOWING RECONSTRUCTIVE SURGERY: ICD-10-CM

## 2024-08-13 DIAGNOSIS — Z17.1 MALIGNANT NEOPLASM OF UPPER-OUTER QUADRANT OF LEFT BREAST IN FEMALE, ESTROGEN RECEPTOR NEGATIVE (MULTI): Primary | ICD-10-CM

## 2024-08-13 DIAGNOSIS — C50.412 MALIGNANT NEOPLASM OF UPPER-OUTER QUADRANT OF LEFT BREAST IN FEMALE, ESTROGEN RECEPTOR NEGATIVE (MULTI): Primary | ICD-10-CM

## 2024-08-13 PROCEDURE — 99024 POSTOP FOLLOW-UP VISIT: CPT | Performed by: SURGERY

## 2024-08-19 DIAGNOSIS — C50.812 MALIGNANT NEOPLASM OF OVERLAPPING SITES OF LEFT BREAST IN FEMALE, ESTROGEN RECEPTOR POSITIVE (MULTI): Primary | ICD-10-CM

## 2024-08-19 DIAGNOSIS — Z17.0 MALIGNANT NEOPLASM OF OVERLAPPING SITES OF LEFT BREAST IN FEMALE, ESTROGEN RECEPTOR POSITIVE (MULTI): Primary | ICD-10-CM

## 2024-08-19 RX ORDER — TAMOXIFEN CITRATE 20 MG/1
20 TABLET ORAL DAILY
COMMUNITY
End: 2024-08-19 | Stop reason: SDUPTHER

## 2024-08-19 RX ORDER — TAMOXIFEN CITRATE 20 MG/1
20 TABLET ORAL DAILY
Qty: 90 TABLET | Refills: 1 | Status: SHIPPED | OUTPATIENT
Start: 2024-08-19

## 2024-08-19 NOTE — TELEPHONE ENCOUNTER
Iris Barth called the refill line for Tamoxifen. Medication pended to team to approve and submit. Next FUV is Jan 2025.

## 2024-10-15 ENCOUNTER — APPOINTMENT (OUTPATIENT)
Dept: PRIMARY CARE | Facility: CLINIC | Age: 44
End: 2024-10-15
Payer: COMMERCIAL

## 2024-10-15 VITALS
WEIGHT: 183.6 LBS | HEART RATE: 109 BPM | SYSTOLIC BLOOD PRESSURE: 110 MMHG | DIASTOLIC BLOOD PRESSURE: 72 MMHG | BODY MASS INDEX: 29.63 KG/M2

## 2024-10-15 DIAGNOSIS — Z17.0 MALIGNANT NEOPLASM OF OVERLAPPING SITES OF LEFT BREAST IN FEMALE, ESTROGEN RECEPTOR POSITIVE: ICD-10-CM

## 2024-10-15 DIAGNOSIS — Z17.1 MALIGNANT NEOPLASM OF UPPER-OUTER QUADRANT OF LEFT BREAST IN FEMALE, ESTROGEN RECEPTOR NEGATIVE: ICD-10-CM

## 2024-10-15 DIAGNOSIS — E66.811 CLASS 1 OBESITY WITHOUT SERIOUS COMORBIDITY WITH BODY MASS INDEX (BMI) OF 34.0 TO 34.9 IN ADULT, UNSPECIFIED OBESITY TYPE: ICD-10-CM

## 2024-10-15 DIAGNOSIS — C50.412 MALIGNANT NEOPLASM OF UPPER-OUTER QUADRANT OF LEFT BREAST IN FEMALE, ESTROGEN RECEPTOR NEGATIVE: ICD-10-CM

## 2024-10-15 DIAGNOSIS — C50.812 MALIGNANT NEOPLASM OF OVERLAPPING SITES OF LEFT BREAST IN FEMALE, ESTROGEN RECEPTOR POSITIVE: ICD-10-CM

## 2024-10-15 PROCEDURE — 1036F TOBACCO NON-USER: CPT | Performed by: FAMILY MEDICINE

## 2024-10-15 PROCEDURE — 99214 OFFICE O/P EST MOD 30 MIN: CPT | Performed by: FAMILY MEDICINE

## 2024-10-15 RX ORDER — AZELAIC ACID 0.15 G/G
AEROSOL, FOAM TOPICAL
COMMUNITY
Start: 2024-07-14

## 2024-10-15 RX ORDER — SEMAGLUTIDE 2.4 MG/.75ML
2.4 INJECTION, SOLUTION SUBCUTANEOUS
Qty: 9 ML | Refills: 3 | Status: SHIPPED | OUTPATIENT
Start: 2024-10-15

## 2024-10-15 ASSESSMENT — ENCOUNTER SYMPTOMS
COLOR CHANGE: 0
CHOKING: 0
BACK PAIN: 0
APPETITE CHANGE: 0
FACIAL ASYMMETRY: 0
DIZZINESS: 0
FATIGUE: 0
COUGH: 0
PALPITATIONS: 0
CHEST TIGHTNESS: 0
HEADACHES: 0
ARTHRALGIAS: 0
ACTIVITY CHANGE: 0
FEVER: 0
LIGHT-HEADEDNESS: 0

## 2024-10-29 ENCOUNTER — TELEPHONE (OUTPATIENT)
Dept: PHARMACY | Facility: HOSPITAL | Age: 44
End: 2024-10-29
Payer: COMMERCIAL

## 2024-10-29 ENCOUNTER — TELEPHONE (OUTPATIENT)
Dept: PRIMARY CARE | Facility: CLINIC | Age: 44
End: 2024-10-29
Payer: COMMERCIAL

## 2024-11-22 NOTE — PROGRESS NOTES
"PLASTIC SURGERY PRE-OP CLINIC NOTE  Date: 11/26/24  Subjective :  Patient ID: Iris Barth  is a 44 y.o. female is here for pre-op appointment     Planned Date of Procedure: 12/12/24  Planned Surgical Procedure: Bilateral Fat Grafting for Symmetry    HPI:   Iris Barth is a 44 y.o. female is here for pre-operative appointment for the above procedure(s).     Currently, the patient states there were no changes in their medications or medical history.     Patient's vitals are Blood pressure 133/88, pulse 89, height 1.676 m (5' 6\"), weight 83 kg (183 lb).   today.     The patient does not have their post-operative compressive garments today.     Patient is currently on an ACE, ARB or Diuretic and has started to transition the medication dosing to the evening.     Patient is not currently on an ACE, ARB, or Diuretic.     Patient has Good Rx Card.     Patient is not on chronic NSAIDs.       MEDICATIONS    Current Outpatient Medications:     acetaminophen (Tylenol Extra Strength) 500 mg tablet, Take 2 tablets (1,000 mg) by mouth every 8 hours for 14 days., Disp: 84 tablet, Rfl: 0    celecoxib (CeleBREX) 200 mg capsule, Take 1 capsule (200 mg) by mouth 2 times a day for 14 days., Disp: 28 capsule, Rfl: 0    cetirizine (ZyrTEC) 10 mg tablet, Take 1 tablet (10 mg) by mouth once daily as needed for allergies., Disp: , Rfl:     cholecalciferol (Vitamin D-3) 25 MCG (1000 UT) tablet, Take 1 tablet (25 mcg) by mouth once daily., Disp: , Rfl:     cyanocobalamin, vitamin B-12, (VITAMIN B-12 ORAL), Take 1,000 mcg by mouth once daily., Disp: , Rfl:     diazePAM (Valium) 5 mg tablet, Take 1 tablet (5 mg) by mouth 1 time for 1 dose., Disp: 1 tablet, Rfl: 0    Finacea 15 % foam, Apply to the full face once daily, Disp: , Rfl:     fluticasone (Flonase) 50 mcg/actuation nasal spray, Administer 1 spray into each nostril once daily as needed for rhinitis or allergies. Shake gently. Before first use, prime pump. After use, clean " tip and replace cap., Disp: , Rfl:     gabapentin (Neurontin) 300 mg capsule, Take 1 capsule (300 mg) by mouth every 8 hours for 14 days., Disp: 42 capsule, Rfl: 0    LORazepam (Ativan) 0.5 mg tablet, Take 2 tablets (1 mg) by mouth 3 times a day as needed (anxiety)., Disp: , Rfl:     multivitamin-children's (Cerovite, Jr) chewable tablet, Chew 2 tablets once daily. Flintstones chewable, Disp: , Rfl:     semaglutide, weight loss, (Wegovy) 2.4 mg/0.75 mL pen injector, Inject 2.4 mg under the skin every 7 days., Disp: 9 mL, Rfl: 3    tamoxifen (Nolvadex) 20 mg tablet, Take 1 tablet (20 mg total) by mouth once daily.  Take with water or any other nonalcoholic drink with or without food at around the same time(s) every day., Disp: 90 tablet, Rfl: 1    zinc acetate 50 mg (zinc) capsule, Take 1 tablet by mouth once daily., Disp: , Rfl:      REVIEW OF SYSTEMS:    Constitutional: negative for fevers, chills, unintentional weight loss  HEENT: negative for changes in vision, headaches, changes in hearing, congestion, sore throat  Cardiovascular: negative for chest pain, palpitations  Respiratory: negative for cough, shortness of breath  Gastrointestinal: negative for nausea, vomiting, diarrhea  Genitourinary: negative for dysuria, hematuria  Musculoskeletal: negative for joint swelling or pain  Skin: negative for rashes or lesions  Psych: negative for depression, anxiety  Endocrine: negative for polyuria, polydipsia, cold/heat intolerance  Hem/Lymph: negative for bleeding disorder    PHYSICAL EXAM  General: alert and oriented, no apparent distress  Psych: normal mood and affect, judgement intact.   Eyes: No conjunctival erythema, extraocular movements intact, no scleral icterus, pupils equal and reactive to light  HENT: normocephalic, atraumatic, no rhinorrhea, no trauma to external meatus, no prior surgical scars  CV: hemodynamically stable  Resp: unlabored, no increased work of breathing, equal bilateral chest rise, no cough  or stridor  Abdomen: soft, non-tender, non-distended. No surgical scars present. No rashes noted. No rectus diastasis present   Neuro: Unremarkable without focal findings, AAOx3, CN 2-12 grossly intact  Extremities/Musculoskeletal: Upper and lower extremities are atraumatic in appearance without tenderness or deformity. No swelling or erythema. Full range of motion is noted to all joints. Steady gait noted.    Skin: Intact, soft and warm; no rashes, lesions, or bruising. No large masses or keloids noted on exam.     Focused exam of the breasts:        LABORATORY  Nutrition  Lab Results   Component Value Date    ALBUMIN 4.7 02/06/2024          ASSESSMENT/PLAN  Iris Barth is a 44 y.o. female who had Left breast tissue expander exchange for implant with bilateral breast fat grafting on 7/22/24     The patient will be undergoing Bilateral Fat Grafting for Symmetry on 12/12/24 at [facility]    Informed consent discussed with the patient, including: condition, proposed care, treatments and services, alternative forms of treatment, and risks of no treatment.  Details discussed around the procedures to be used, and the risks and hazards involved, potential benefits, and side effects of the patient's proposed care, treatment, and services; the likelihood of the patient achieving his or her goals; and any potential problems that might occur during recuperation. Reasonable alternative also discussed with the patient's proposed care, treatment, and services. The discussion encompasses risks, benefits, and side effects related to the alternative and risks related to not receiving the proposed care, treatment, and services. Written informed consent was obtained.    The patient was counseled to avoid anticoagulation medications and herbals including - but not limited to - ASA, NSAIDs, Plavix, fish oils, and green tea    Patient was counseled to avoid nicotine and areas with high amounts of secondhand smoke.     Patient was  counseled to increase protein intake after surgery to aid with wound healing with goal of 120g/day.     Patient was counseled on need for compression garments and/or support bras, given information about where to acquire these garments, and instructions to bring with on the day of surgery.     We discussed postoperative follow-up visits, recuperation and return to work.    Advised patient to contact office with any questions or concerns    All findings and diagnosis was discussed with patient at the time of this visit. Patient states they understand and are in agreement with the treatment plan at the time of this visit.     Photos completed today, 11/26/24    Medications eRx'd:   - Valium 5mg PO x 1 to be taken morning of surgery (ie while driving over) for white coat HTN.   -Celebrex 200 mg BID with meals- Good Rx card provided to the patient.  -Gabapentin 300 mg Q8H   -Tylenol 1000 mg Q8H  -Hibiclens - instructed the patient to wash breast and/or abdomen and margins the night before surgery or the morning of surgery; avoid washing face/eyes (2 packets if breast only, and 5 packets if breast and abdomen)      RTC 12/23/24 at [facility] after surgery     Scribe Attestation  By signing my name below, EMMY Moraima Haleigh, Scribe, attest that this documentation has been prepared under the direction and in the presence of Ayah Edgar MD.     Attending Attestation:  Ayah BOOTH MD, personal performed the history, exam, and decision making on this patient.  A total of 30 mins were spent in patient counseling, review of medical records, and coordination of care.

## 2024-11-22 NOTE — H&P (VIEW-ONLY)
"PLASTIC SURGERY PRE-OP CLINIC NOTE  Date: 11/26/24  Subjective :  Patient ID: Iris Barth  is a 44 y.o. female is here for pre-op appointment     Planned Date of Procedure: 12/12/24  Planned Surgical Procedure: Bilateral Fat Grafting for Symmetry    HPI:   Iris Barth is a 44 y.o. female is here for pre-operative appointment for the above procedure(s).     Currently, the patient states there were no changes in their medications or medical history.     Patient's vitals are Blood pressure 133/88, pulse 89, height 1.676 m (5' 6\"), weight 83 kg (183 lb).   today.     The patient does not have their post-operative compressive garments today.     Patient is currently on an ACE, ARB or Diuretic and has started to transition the medication dosing to the evening.     Patient is not currently on an ACE, ARB, or Diuretic.     Patient has Good Rx Card.     Patient is not on chronic NSAIDs.       MEDICATIONS    Current Outpatient Medications:     acetaminophen (Tylenol Extra Strength) 500 mg tablet, Take 2 tablets (1,000 mg) by mouth every 8 hours for 14 days., Disp: 84 tablet, Rfl: 0    celecoxib (CeleBREX) 200 mg capsule, Take 1 capsule (200 mg) by mouth 2 times a day for 14 days., Disp: 28 capsule, Rfl: 0    cetirizine (ZyrTEC) 10 mg tablet, Take 1 tablet (10 mg) by mouth once daily as needed for allergies., Disp: , Rfl:     cholecalciferol (Vitamin D-3) 25 MCG (1000 UT) tablet, Take 1 tablet (25 mcg) by mouth once daily., Disp: , Rfl:     cyanocobalamin, vitamin B-12, (VITAMIN B-12 ORAL), Take 1,000 mcg by mouth once daily., Disp: , Rfl:     diazePAM (Valium) 5 mg tablet, Take 1 tablet (5 mg) by mouth 1 time for 1 dose., Disp: 1 tablet, Rfl: 0    Finacea 15 % foam, Apply to the full face once daily, Disp: , Rfl:     fluticasone (Flonase) 50 mcg/actuation nasal spray, Administer 1 spray into each nostril once daily as needed for rhinitis or allergies. Shake gently. Before first use, prime pump. After use, clean " tip and replace cap., Disp: , Rfl:     gabapentin (Neurontin) 300 mg capsule, Take 1 capsule (300 mg) by mouth every 8 hours for 14 days., Disp: 42 capsule, Rfl: 0    LORazepam (Ativan) 0.5 mg tablet, Take 2 tablets (1 mg) by mouth 3 times a day as needed (anxiety)., Disp: , Rfl:     multivitamin-children's (Cerovite, Jr) chewable tablet, Chew 2 tablets once daily. Flintstones chewable, Disp: , Rfl:     semaglutide, weight loss, (Wegovy) 2.4 mg/0.75 mL pen injector, Inject 2.4 mg under the skin every 7 days., Disp: 9 mL, Rfl: 3    tamoxifen (Nolvadex) 20 mg tablet, Take 1 tablet (20 mg total) by mouth once daily.  Take with water or any other nonalcoholic drink with or without food at around the same time(s) every day., Disp: 90 tablet, Rfl: 1    zinc acetate 50 mg (zinc) capsule, Take 1 tablet by mouth once daily., Disp: , Rfl:      REVIEW OF SYSTEMS:    Constitutional: negative for fevers, chills, unintentional weight loss  HEENT: negative for changes in vision, headaches, changes in hearing, congestion, sore throat  Cardiovascular: negative for chest pain, palpitations  Respiratory: negative for cough, shortness of breath  Gastrointestinal: negative for nausea, vomiting, diarrhea  Genitourinary: negative for dysuria, hematuria  Musculoskeletal: negative for joint swelling or pain  Skin: negative for rashes or lesions  Psych: negative for depression, anxiety  Endocrine: negative for polyuria, polydipsia, cold/heat intolerance  Hem/Lymph: negative for bleeding disorder    PHYSICAL EXAM  General: alert and oriented, no apparent distress  Psych: normal mood and affect, judgement intact.   Eyes: No conjunctival erythema, extraocular movements intact, no scleral icterus, pupils equal and reactive to light  HENT: normocephalic, atraumatic, no rhinorrhea, no trauma to external meatus, no prior surgical scars  CV: hemodynamically stable  Resp: unlabored, no increased work of breathing, equal bilateral chest rise, no cough  or stridor  Abdomen: soft, non-tender, non-distended. No surgical scars present. No rashes noted. No rectus diastasis present   Neuro: Unremarkable without focal findings, AAOx3, CN 2-12 grossly intact  Extremities/Musculoskeletal: Upper and lower extremities are atraumatic in appearance without tenderness or deformity. No swelling or erythema. Full range of motion is noted to all joints. Steady gait noted.    Skin: Intact, soft and warm; no rashes, lesions, or bruising. No large masses or keloids noted on exam.     Focused exam of the breasts:        LABORATORY  Nutrition  Lab Results   Component Value Date    ALBUMIN 4.7 02/06/2024          ASSESSMENT/PLAN  Iris Barth is a 44 y.o. female who had Left breast tissue expander exchange for implant with bilateral breast fat grafting on 7/22/24     The patient will be undergoing Bilateral Fat Grafting for Symmetry on 12/12/24 at [facility]    Informed consent discussed with the patient, including: condition, proposed care, treatments and services, alternative forms of treatment, and risks of no treatment.  Details discussed around the procedures to be used, and the risks and hazards involved, potential benefits, and side effects of the patient's proposed care, treatment, and services; the likelihood of the patient achieving his or her goals; and any potential problems that might occur during recuperation. Reasonable alternative also discussed with the patient's proposed care, treatment, and services. The discussion encompasses risks, benefits, and side effects related to the alternative and risks related to not receiving the proposed care, treatment, and services. Written informed consent was obtained.    The patient was counseled to avoid anticoagulation medications and herbals including - but not limited to - ASA, NSAIDs, Plavix, fish oils, and green tea    Patient was counseled to avoid nicotine and areas with high amounts of secondhand smoke.     Patient was  counseled to increase protein intake after surgery to aid with wound healing with goal of 120g/day.     Patient was counseled on need for compression garments and/or support bras, given information about where to acquire these garments, and instructions to bring with on the day of surgery.     We discussed postoperative follow-up visits, recuperation and return to work.    Advised patient to contact office with any questions or concerns    All findings and diagnosis was discussed with patient at the time of this visit. Patient states they understand and are in agreement with the treatment plan at the time of this visit.     Photos completed today, 11/26/24    Medications eRx'd:   - Valium 5mg PO x 1 to be taken morning of surgery (ie while driving over) for white coat HTN.   -Celebrex 200 mg BID with meals- Good Rx card provided to the patient.  -Gabapentin 300 mg Q8H   -Tylenol 1000 mg Q8H  -Hibiclens - instructed the patient to wash breast and/or abdomen and margins the night before surgery or the morning of surgery; avoid washing face/eyes (2 packets if breast only, and 5 packets if breast and abdomen)      RTC 12/23/24 at [facility] after surgery     Scribe Attestation  By signing my name below, EMMY Moraima Haleigh, Scribe, attest that this documentation has been prepared under the direction and in the presence of Ayah Edgar MD.     Attending Attestation:  Ayah BOOTH MD, personal performed the history, exam, and decision making on this patient.  A total of 30 mins were spent in patient counseling, review of medical records, and coordination of care.

## 2024-11-26 ENCOUNTER — APPOINTMENT (OUTPATIENT)
Dept: PLASTIC SURGERY | Facility: CLINIC | Age: 44
End: 2024-11-26
Payer: COMMERCIAL

## 2024-11-26 VITALS
HEIGHT: 66 IN | SYSTOLIC BLOOD PRESSURE: 133 MMHG | DIASTOLIC BLOOD PRESSURE: 88 MMHG | HEART RATE: 89 BPM | WEIGHT: 183 LBS | BODY MASS INDEX: 29.41 KG/M2

## 2024-11-26 DIAGNOSIS — C50.412 MALIGNANT NEOPLASM OF UPPER-OUTER QUADRANT OF LEFT BREAST IN FEMALE, ESTROGEN RECEPTOR NEGATIVE: ICD-10-CM

## 2024-11-26 DIAGNOSIS — G89.18 POST-OP PAIN: ICD-10-CM

## 2024-11-26 DIAGNOSIS — Z17.1 MALIGNANT NEOPLASM OF UPPER-OUTER QUADRANT OF LEFT BREAST IN FEMALE, ESTROGEN RECEPTOR NEGATIVE: ICD-10-CM

## 2024-11-26 PROCEDURE — 99024 POSTOP FOLLOW-UP VISIT: CPT | Performed by: SURGERY

## 2024-11-26 PROCEDURE — 3008F BODY MASS INDEX DOCD: CPT | Performed by: SURGERY

## 2024-11-26 RX ORDER — DIAZEPAM 5 MG/1
5 TABLET ORAL ONCE
Qty: 1 TABLET | Refills: 0 | Status: SHIPPED | OUTPATIENT
Start: 2024-11-26 | End: 2024-11-26

## 2024-11-26 RX ORDER — CELECOXIB 200 MG/1
200 CAPSULE ORAL 2 TIMES DAILY
Qty: 28 CAPSULE | Refills: 0 | Status: SHIPPED | OUTPATIENT
Start: 2024-11-26 | End: 2024-12-10

## 2024-11-26 RX ORDER — GABAPENTIN 300 MG/1
300 CAPSULE ORAL EVERY 8 HOURS
Qty: 42 CAPSULE | Refills: 0 | Status: SHIPPED | OUTPATIENT
Start: 2024-11-26 | End: 2024-12-10

## 2024-11-26 RX ORDER — ACETAMINOPHEN 500 MG
1000 TABLET ORAL EVERY 8 HOURS
Qty: 84 TABLET | Refills: 0 | Status: SHIPPED | OUTPATIENT
Start: 2024-11-26 | End: 2024-12-10

## 2024-11-26 RX ORDER — CHLORHEXIDINE GLUCONATE 40 MG/ML
SOLUTION TOPICAL ONCE
Qty: 118 ML | Refills: 0 | Status: SHIPPED | OUTPATIENT
Start: 2024-11-26 | End: 2024-11-26

## 2024-12-04 RX ORDER — SULFAMETHOXAZOLE AND TRIMETHOPRIM 800; 160 MG/1; MG/1
1 TABLET ORAL 2 TIMES DAILY
Qty: 14 TABLET | Refills: 0 | Status: SHIPPED | OUTPATIENT
Start: 2024-12-04 | End: 2024-12-11

## 2024-12-10 RX ORDER — CEFAZOLIN SODIUM 2 G/100ML
2 INJECTION, SOLUTION INTRAVENOUS ONCE
Status: CANCELLED | OUTPATIENT
Start: 2024-12-16 | End: 2024-12-10

## 2024-12-10 RX ORDER — GABAPENTIN 300 MG/1
600 CAPSULE ORAL ONCE
Status: CANCELLED | OUTPATIENT
Start: 2024-12-16 | End: 2024-12-10

## 2024-12-10 RX ORDER — ACETAMINOPHEN 325 MG/1
975 TABLET ORAL ONCE
Status: CANCELLED | OUTPATIENT
Start: 2024-12-16 | End: 2024-12-10

## 2024-12-10 RX ORDER — APREPITANT 40 MG/1
40 CAPSULE ORAL ONCE
Status: CANCELLED | OUTPATIENT
Start: 2024-12-16 | End: 2024-12-10

## 2024-12-11 ENCOUNTER — ANESTHESIA EVENT (OUTPATIENT)
Dept: OPERATING ROOM | Facility: HOSPITAL | Age: 44
End: 2024-12-11
Payer: COMMERCIAL

## 2024-12-12 ENCOUNTER — ANESTHESIA (OUTPATIENT)
Dept: OPERATING ROOM | Facility: HOSPITAL | Age: 44
End: 2024-12-12
Payer: COMMERCIAL

## 2024-12-12 ENCOUNTER — HOSPITAL ENCOUNTER (OUTPATIENT)
Facility: HOSPITAL | Age: 44
Setting detail: OUTPATIENT SURGERY
Discharge: HOME | End: 2024-12-12
Attending: SURGERY | Admitting: SURGERY
Payer: COMMERCIAL

## 2024-12-13 ENCOUNTER — TELEPHONE (OUTPATIENT)
Dept: PREOP | Facility: HOSPITAL | Age: 44
End: 2024-12-13

## 2024-12-13 DIAGNOSIS — C50.412 MALIGNANT NEOPLASM OF UPPER-OUTER QUADRANT OF LEFT BREAST IN FEMALE, ESTROGEN RECEPTOR NEGATIVE: ICD-10-CM

## 2024-12-13 DIAGNOSIS — Z17.1 MALIGNANT NEOPLASM OF UPPER-OUTER QUADRANT OF LEFT BREAST IN FEMALE, ESTROGEN RECEPTOR NEGATIVE: ICD-10-CM

## 2024-12-13 RX ORDER — ACETAMINOPHEN 325 MG/1
650 TABLET ORAL EVERY 4 HOURS PRN
Status: CANCELLED | OUTPATIENT
Start: 2024-12-13

## 2024-12-13 RX ORDER — OXYCODONE HYDROCHLORIDE 5 MG/1
5 TABLET ORAL EVERY 4 HOURS PRN
Status: CANCELLED | OUTPATIENT
Start: 2024-12-13

## 2024-12-13 RX ORDER — ALBUTEROL SULFATE 0.83 MG/ML
2.5 SOLUTION RESPIRATORY (INHALATION) ONCE AS NEEDED
Status: CANCELLED | OUTPATIENT
Start: 2024-12-13

## 2024-12-13 RX ORDER — DIAZEPAM 5 MG/1
5 TABLET ORAL ONCE
Qty: 1 TABLET | Refills: 0 | Status: SHIPPED | OUTPATIENT
Start: 2024-12-13 | End: 2024-12-16

## 2024-12-13 RX ORDER — ONDANSETRON HYDROCHLORIDE 2 MG/ML
4 INJECTION, SOLUTION INTRAVENOUS ONCE AS NEEDED
Status: CANCELLED | OUTPATIENT
Start: 2024-12-13

## 2024-12-13 NOTE — ANESTHESIA PREPROCEDURE EVALUATION
Patient: Iris Barth    Procedure Information       Date/Time: 12/16/24 0730    Procedures:       Bilateral Breast Insertion Adipose Tissue (Bilateral: Breast)      Left Breast Revision Reconstruction (Left: Breast)    Location: [facility] A OR 02 / [facility] OR    Surgeons: Ayah Edgar MD            Relevant Problems   Anesthesia   (+) PONV (postoperative nausea and vomiting)      Neuro   (+) Anxiety      GI   (+) Gastroesophageal reflux disease      Musculoskeletal   (+) Chondromalacia of patella      ID   (+) Infection of right breast   (+) Surgical site infection      GYN   (+) Malignant neoplasm of overlapping sites of left breast in female, estrogen receptor positive   (+) Malignant neoplasm of upper-outer quadrant of left breast in female, estrogen receptor negative       Clinical information reviewed:   Tobacco  Allergies  Meds   Med Hx  Surg Hx  OB Status  Fam Hx  Soc   Hx         Past Medical History:   Diagnosis Date    Abnormal finding on breast imaging 11/07/2023    Acid reflux     pepcis prn    Anxiety     Lorazepam as needed    Breast CA (Multi)     left    COVID-19 12/09/2023    was given Paxlovid, fully recovered    DVT (deep vein thrombosis) in pregnancy ([facility])     proved after left knee scope,  left calf , SQ heparin briefly with no further issues, requests post op Heparin for this procedure    Heart murmur     asymptomatic      Past Surgical History:   Procedure Laterality Date    BELT ABDOMINOPLASTY      BI STEREOTACTIC GUIDED BREAST RIGHT LOCALIZATION AND BIOPSY Right 11/10/2023    BI STEREOSTATIC GUIDED BREAST RIGHT LOCALIZATION AND BIOPSY 11/10/2023 Francia Krishnamurthy MD [facility]    BI US GUIDED BREAST LOCALIZATION AND BIOPSY LEFT Left 11/10/2023    BI US GUIDED BREAST LOCALIZATION AND BIOPSY LEFT 11/10/2023 Francia Krishnamurthy MD [facility]    BREAST CYST INCISION AND DRAINAGE Left     KNEE ARTHROSCOPY W/ DEBRIDEMENT      x2    MASTECTOMY Bilateral 01/08/2024    multiple subsquent  "procedures for reconstruction     Social History     Tobacco Use    Smoking status: Never     Passive exposure: Never    Smokeless tobacco: Never   Vaping Use    Vaping status: Never Used   Substance Use Topics    Alcohol use: Not Currently     Comment: wine or cocktail 2x/month    Drug use: Never      Current Outpatient Medications   Medication Instructions    acetaminophen (TYLENOL EXTRA STRENGTH) 1,000 mg, oral, Every 8 hours    cetirizine (ZYRTEC) 10 mg, Daily PRN    cholecalciferol (Vitamin D-3) 25 MCG (1000 UT) tablet 1 tablet, Daily    cyanocobalamin, vitamin B-12, (VITAMIN B-12 ORAL) 1,000 mcg, Daily    diazePAM (VALIUM) 5 mg, oral, Once    fluticasone (Flonase) 50 mcg/actuation nasal spray 1 spray, Daily PRN    gabapentin (NEURONTIN) 300 mg, oral, Every 8 hours    ibuprofen-diphenhydramine cit (Advil PM) 200-38 mg tablet per tablet 400 mg, Once    LORazepam (ATIVAN) 1 mg, 3 times daily PRN    multivitamin-children's (Cerovite, Jr) chewable tablet 2 tablets, Daily    tamoxifen (NOLVADEX) 20 mg, oral, Daily, Take with water or any other nonalcoholic drink with or without food at around the same time(s) every day.    Wegovy 2.4 mg, subcutaneous, Every 7 days    zinc acetate 50 mg (zinc) capsule 1 tablet, Daily      No Known Allergies     Chemistry    Lab Results   Component Value Date/Time     02/06/2024 1720    K 3.4 (L) 02/06/2024 1720     02/06/2024 1720    CO2 23 02/06/2024 1720    BUN 19 02/06/2024 1720    CREATININE 0.78 02/06/2024 1720    Lab Results   Component Value Date/Time    CALCIUM 10.0 02/06/2024 1720    ALKPHOS 65 01/04/2024 0908    AST 21 01/04/2024 0908    ALT 23 01/04/2024 0908    BILITOT 0.4 01/04/2024 0908          Lab Results   Component Value Date    HGBA1C 4.6 12/05/2023     Lab Results   Component Value Date/Time    WBC 10.4 02/06/2024 1720    HGB 13.3 02/06/2024 1720    HCT 38.7 02/06/2024 1720     02/06/2024 1720     No results found for: \"PROTIME\", \"PTT\", " "\"INR\"  No results found for: \"ABORH\"  No results found for this or any previous visit (from the past 4464 hours).  No results found for this or any previous visit from the past 1095 days.       Visit Vitals  /80   Pulse 91   Temp 36.7 °C (98.1 °F) (Temporal)   Resp 16   Ht 1.676 m (5' 6\")   Wt 84.9 kg (187 lb 2.7 oz)   LMP 10/16/2024 (Approximate) Comment: HCG NEGATIVE   SpO2 99%   BMI 30.21 kg/m²   OB Status Having periods   Smoking Status Never   BSA 1.99 m²     NPO/Void Status  Carbohydrate Drink Given Prior to Surgery? : N  Date of Last Liquid: 12/16/24  Time of Last Liquid: 0550  Date of Last Solid: 12/15/24  Time of Last Solid: 1830  Last Intake Type: Clear fluids  Time of Last Void: 0640        Physical Exam    Airway  Mallampati: III  TM distance: >3 FB  Neck ROM: full     Cardiovascular - normal exam  Rhythm: regular  Rate: normal     Dental    Pulmonary - normal exam     Abdominal - normal exam             Anesthesia Plan    History of general anesthesia?: yes  History of complications of general anesthesia?: no    ASA 2     general   (General with ETT. Standard ASA monitoring, scopolamine, emend)  intravenous induction   Postoperative administration of opioids is intended.  Anesthetic plan and risks discussed with patient.    Plan discussed with CAA and CRNA.        "

## 2024-12-16 ENCOUNTER — HOSPITAL ENCOUNTER (OUTPATIENT)
Facility: HOSPITAL | Age: 44
Setting detail: OUTPATIENT SURGERY
Discharge: HOME | End: 2024-12-16
Attending: SURGERY | Admitting: SURGERY
Payer: COMMERCIAL

## 2024-12-16 VITALS
SYSTOLIC BLOOD PRESSURE: 121 MMHG | HEIGHT: 66 IN | HEART RATE: 96 BPM | RESPIRATION RATE: 18 BRPM | WEIGHT: 187.17 LBS | TEMPERATURE: 97.2 F | OXYGEN SATURATION: 96 % | DIASTOLIC BLOOD PRESSURE: 77 MMHG | BODY MASS INDEX: 30.08 KG/M2

## 2024-12-16 LAB — PREGNANCY TEST URINE, POC: NEGATIVE

## 2024-12-16 PROCEDURE — 7100000002 HC RECOVERY ROOM TIME - EACH INCREMENTAL 1 MINUTE: Performed by: SURGERY

## 2024-12-16 PROCEDURE — 2500000004 HC RX 250 GENERAL PHARMACY W/ HCPCS (ALT 636 FOR OP/ED)

## 2024-12-16 PROCEDURE — A19380 PR REVISE BREAST RECONSTRUCTION: Performed by: ANESTHESIOLOGY

## 2024-12-16 PROCEDURE — 81025 URINE PREGNANCY TEST: CPT | Performed by: SURGERY

## 2024-12-16 PROCEDURE — A4649 SURGICAL SUPPLIES: HCPCS | Performed by: SURGERY

## 2024-12-16 PROCEDURE — 2720000007 HC OR 272 NO HCPCS: Performed by: SURGERY

## 2024-12-16 PROCEDURE — 7100000001 HC RECOVERY ROOM TIME - INITIAL BASE CHARGE: Performed by: SURGERY

## 2024-12-16 PROCEDURE — 2500000005 HC RX 250 GENERAL PHARMACY W/O HCPCS: Performed by: ANESTHESIOLOGY

## 2024-12-16 PROCEDURE — 3700000001 HC GENERAL ANESTHESIA TIME - INITIAL BASE CHARGE: Performed by: SURGERY

## 2024-12-16 PROCEDURE — 3600000003 HC OR TIME - INITIAL BASE CHARGE - PROCEDURE LEVEL THREE: Performed by: SURGERY

## 2024-12-16 PROCEDURE — 2500000001 HC RX 250 WO HCPCS SELF ADMINISTERED DRUGS (ALT 637 FOR MEDICARE OP): Performed by: SURGERY

## 2024-12-16 PROCEDURE — 2500000005 HC RX 250 GENERAL PHARMACY W/O HCPCS: Performed by: SURGERY

## 2024-12-16 PROCEDURE — 15772 GRFG AUTOL FAT LIPO EA ADDL: CPT | Performed by: SURGERY

## 2024-12-16 PROCEDURE — 3600000008 HC OR TIME - EACH INCREMENTAL 1 MINUTE - PROCEDURE LEVEL THREE: Performed by: SURGERY

## 2024-12-16 PROCEDURE — 19380 REVJ RECONSTRUCTED BREAST: CPT | Performed by: SURGERY

## 2024-12-16 PROCEDURE — 7100000009 HC PHASE TWO TIME - INITIAL BASE CHARGE: Performed by: SURGERY

## 2024-12-16 PROCEDURE — A19380 PR REVISE BREAST RECONSTRUCTION: Performed by: NURSE ANESTHETIST, CERTIFIED REGISTERED

## 2024-12-16 PROCEDURE — 2500000004 HC RX 250 GENERAL PHARMACY W/ HCPCS (ALT 636 FOR OP/ED): Performed by: ANESTHESIOLOGY

## 2024-12-16 PROCEDURE — 7100000010 HC PHASE TWO TIME - EACH INCREMENTAL 1 MINUTE: Performed by: SURGERY

## 2024-12-16 PROCEDURE — 3700000002 HC GENERAL ANESTHESIA TIME - EACH INCREMENTAL 1 MINUTE: Performed by: SURGERY

## 2024-12-16 PROCEDURE — 15771 GRFG AUTOL FAT LIPO 50 CC/<: CPT | Performed by: SURGERY

## 2024-12-16 PROCEDURE — 2500000004 HC RX 250 GENERAL PHARMACY W/ HCPCS (ALT 636 FOR OP/ED): Performed by: SURGERY

## 2024-12-16 PROCEDURE — 2500000002 HC RX 250 W HCPCS SELF ADMINISTERED DRUGS (ALT 637 FOR MEDICARE OP, ALT 636 FOR OP/ED): Performed by: SURGERY

## 2024-12-16 RX ORDER — DIPHENHYDRAMINE CITRATE AND IBUPROFEN 38; 200 MG/1; MG/1
400 TABLET, COATED ORAL ONCE
COMMUNITY

## 2024-12-16 RX ORDER — APREPITANT 40 MG/1
40 CAPSULE ORAL ONCE
Status: COMPLETED | OUTPATIENT
Start: 2024-12-16 | End: 2024-12-16

## 2024-12-16 RX ORDER — ROCURONIUM BROMIDE 10 MG/ML
INJECTION, SOLUTION INTRAVENOUS AS NEEDED
Status: DISCONTINUED | OUTPATIENT
Start: 2024-12-16 | End: 2024-12-16

## 2024-12-16 RX ORDER — HYDROMORPHONE HYDROCHLORIDE 1 MG/ML
INJECTION, SOLUTION INTRAMUSCULAR; INTRAVENOUS; SUBCUTANEOUS AS NEEDED
Status: DISCONTINUED | OUTPATIENT
Start: 2024-12-16 | End: 2024-12-16

## 2024-12-16 RX ORDER — CEFAZOLIN SODIUM 2 G/100ML
2 INJECTION, SOLUTION INTRAVENOUS ONCE
Status: DISCONTINUED | OUTPATIENT
Start: 2024-12-16 | End: 2024-12-16 | Stop reason: HOSPADM

## 2024-12-16 RX ORDER — LIDOCAINE HYDROCHLORIDE 20 MG/ML
INJECTION, SOLUTION INFILTRATION; PERINEURAL AS NEEDED
Status: DISCONTINUED | OUTPATIENT
Start: 2024-12-16 | End: 2024-12-16

## 2024-12-16 RX ORDER — GABAPENTIN 300 MG/1
600 CAPSULE ORAL ONCE
Status: COMPLETED | OUTPATIENT
Start: 2024-12-16 | End: 2024-12-16

## 2024-12-16 RX ORDER — SODIUM CHLORIDE 0.9 G/100ML
IRRIGANT IRRIGATION AS NEEDED
Status: DISCONTINUED | OUTPATIENT
Start: 2024-12-16 | End: 2024-12-16 | Stop reason: HOSPADM

## 2024-12-16 RX ORDER — ONDANSETRON HYDROCHLORIDE 2 MG/ML
4 INJECTION, SOLUTION INTRAVENOUS ONCE AS NEEDED
Status: COMPLETED | OUTPATIENT
Start: 2024-12-16 | End: 2024-12-16

## 2024-12-16 RX ORDER — KETOROLAC TROMETHAMINE 30 MG/ML
INJECTION, SOLUTION INTRAMUSCULAR; INTRAVENOUS AS NEEDED
Status: DISCONTINUED | OUTPATIENT
Start: 2024-12-16 | End: 2024-12-16

## 2024-12-16 RX ORDER — MUPIROCIN 20 MG/G
OINTMENT TOPICAL AS NEEDED
Status: DISCONTINUED | OUTPATIENT
Start: 2024-12-16 | End: 2024-12-16 | Stop reason: HOSPADM

## 2024-12-16 RX ORDER — OXYCODONE HYDROCHLORIDE 5 MG/1
5 TABLET ORAL EVERY 4 HOURS PRN
Status: DISCONTINUED | OUTPATIENT
Start: 2024-12-16 | End: 2024-12-16 | Stop reason: HOSPADM

## 2024-12-16 RX ORDER — ACETAMINOPHEN 325 MG/1
975 TABLET ORAL ONCE
Status: COMPLETED | OUTPATIENT
Start: 2024-12-16 | End: 2024-12-16

## 2024-12-16 RX ORDER — ALBUTEROL SULFATE 0.83 MG/ML
2.5 SOLUTION RESPIRATORY (INHALATION) ONCE AS NEEDED
Status: DISCONTINUED | OUTPATIENT
Start: 2024-12-16 | End: 2024-12-16 | Stop reason: HOSPADM

## 2024-12-16 RX ORDER — ONDANSETRON HYDROCHLORIDE 2 MG/ML
INJECTION, SOLUTION INTRAVENOUS AS NEEDED
Status: DISCONTINUED | OUTPATIENT
Start: 2024-12-16 | End: 2024-12-16

## 2024-12-16 RX ORDER — PROPOFOL 10 MG/ML
INJECTION, EMULSION INTRAVENOUS AS NEEDED
Status: DISCONTINUED | OUTPATIENT
Start: 2024-12-16 | End: 2024-12-16

## 2024-12-16 RX ORDER — ACETAMINOPHEN 325 MG/1
650 TABLET ORAL EVERY 4 HOURS PRN
Status: DISCONTINUED | OUTPATIENT
Start: 2024-12-16 | End: 2024-12-16 | Stop reason: HOSPADM

## 2024-12-16 RX ORDER — MIDAZOLAM HYDROCHLORIDE 1 MG/ML
INJECTION INTRAMUSCULAR; INTRAVENOUS AS NEEDED
Status: DISCONTINUED | OUTPATIENT
Start: 2024-12-16 | End: 2024-12-16

## 2024-12-16 RX ORDER — FENTANYL CITRATE 50 UG/ML
INJECTION, SOLUTION INTRAMUSCULAR; INTRAVENOUS AS NEEDED
Status: DISCONTINUED | OUTPATIENT
Start: 2024-12-16 | End: 2024-12-16

## 2024-12-16 RX ORDER — CEFAZOLIN 1 G/1
INJECTION, POWDER, FOR SOLUTION INTRAVENOUS AS NEEDED
Status: DISCONTINUED | OUTPATIENT
Start: 2024-12-16 | End: 2024-12-16

## 2024-12-16 RX ORDER — SCOPOLAMINE 1 MG/3D
PATCH, EXTENDED RELEASE TRANSDERMAL
Status: DISCONTINUED
Start: 2024-12-16 | End: 2024-12-16 | Stop reason: HOSPADM

## 2024-12-16 RX ORDER — PHENYLEPHRINE HCL IN 0.9% NACL 1 MG/10 ML
SYRINGE (ML) INTRAVENOUS AS NEEDED
Status: DISCONTINUED | OUTPATIENT
Start: 2024-12-16 | End: 2024-12-16

## 2024-12-16 RX ORDER — SODIUM CHLORIDE, SODIUM LACTATE, POTASSIUM CHLORIDE, CALCIUM CHLORIDE 600; 310; 30; 20 MG/100ML; MG/100ML; MG/100ML; MG/100ML
INJECTION, SOLUTION INTRAVENOUS CONTINUOUS PRN
Status: DISCONTINUED | OUTPATIENT
Start: 2024-12-16 | End: 2024-12-16

## 2024-12-16 RX ADMIN — ONDANSETRON 4 MG: 2 INJECTION, SOLUTION INTRAMUSCULAR; INTRAVENOUS at 12:52

## 2024-12-16 RX ADMIN — HYDROMORPHONE HYDROCHLORIDE 0.5 MG: 1 INJECTION, SOLUTION INTRAMUSCULAR; INTRAVENOUS; SUBCUTANEOUS at 13:09

## 2024-12-16 RX ADMIN — GABAPENTIN 600 MG: 300 CAPSULE ORAL at 07:23

## 2024-12-16 RX ADMIN — APREPITANT 40 MG: 40 CAPSULE ORAL at 07:23

## 2024-12-16 RX ADMIN — Medication 2 L/MIN: at 13:15

## 2024-12-16 RX ADMIN — ACETAMINOPHEN 975 MG: 325 TABLET, FILM COATED ORAL at 07:23

## 2024-12-16 RX ADMIN — PROMETHAZINE HYDROCHLORIDE 6.25 MG: 25 INJECTION INTRAMUSCULAR; INTRAVENOUS at 13:10

## 2024-12-16 ASSESSMENT — PAIN SCALES - GENERAL
PAINLEVEL_OUTOF10: 0 - NO PAIN
PAINLEVEL_OUTOF10: 0 - NO PAIN
PAINLEVEL_OUTOF10: 7
PAINLEVEL_OUTOF10: 0 - NO PAIN

## 2024-12-16 ASSESSMENT — COLUMBIA-SUICIDE SEVERITY RATING SCALE - C-SSRS
1. IN THE PAST MONTH, HAVE YOU WISHED YOU WERE DEAD OR WISHED YOU COULD GO TO SLEEP AND NOT WAKE UP?: NO
6. HAVE YOU EVER DONE ANYTHING, STARTED TO DO ANYTHING, OR PREPARED TO DO ANYTHING TO END YOUR LIFE?: NO
2. HAVE YOU ACTUALLY HAD ANY THOUGHTS OF KILLING YOURSELF?: NO

## 2024-12-16 NOTE — ANESTHESIA PROCEDURE NOTES
Airway  Date/Time: 12/16/2024 8:10 AM  Urgency: elective    Airway not difficult    Staffing  Performed: BRETT   Authorized by: Michael Daniels MD    Performed by: Mary Jo Ruiz  Patient location during procedure: OR    Indications and Patient Condition  Indications for airway management: anesthesia  Spontaneous Ventilation: absent  Sedation level: deep  Preoxygenated: yes  Patient position: sniffing  MILS maintained throughout  Mask difficulty assessment: 1 - vent by mask  Planned trial extubation    Final Airway Details  Final airway type: endotracheal airway      Successful airway: ETT  Cuffed: yes   Successful intubation technique: direct laryngoscopy  Facilitating devices/methods: intubating stylet  Endotracheal tube insertion site: oral  Blade: Nick  Blade size: #3  ETT size (mm): 7.0  Cormack-Lehane Classification: grade I - full view of glottis  Placement verified by: capnometry   Cuff volume (mL): 8  Measured from: lips  ETT to lips (cm): 21  Number of attempts at approach: 1           02-Apr-2023 04:38

## 2024-12-16 NOTE — ANESTHESIA POSTPROCEDURE EVALUATION
Patient: Iris Barth    Procedure Summary       Date: 12/16/24 Room / Location: [facility] OR 02 / [facility]    Anesthesia Start: 0804 Anesthesia Stop: 1235    Procedures:       Bilateral Breast Insertion Adipose Tissue (Bilateral: Breast)      Left Breast Revision Reconstruction (Left: Breast) Diagnosis:       Malignant neoplasm of upper-outer quadrant of left breast in female, estrogen receptor negative      Breast asymmetry following reconstructive surgery      (Malignant neoplasm of upper-outer quadrant of left breast in female, estrogen receptor negative (Multi) [C50.412, Z17.1])      (Breast asymmetry following reconstructive surgery [N65.1])    Surgeons: Ayah Edgar MD Responsible Provider: Michael Daniels MD    Anesthesia Type: general ASA Status: 2            Anesthesia Type: general    Vitals Value Taken Time   /71 12/16/24 1459   Temp 36.2 °C (97.2 °F) 12/16/24 1459   Pulse 86 12/16/24 1459   Resp 12 12/16/24 1459   SpO2 97 % 12/16/24 1459       Anesthesia Post Evaluation    Patient location during evaluation: PACU  Patient participation: complete - patient participated  Level of consciousness: awake and alert  Pain management: adequate  Airway patency: patent  Cardiovascular status: acceptable and hemodynamically stable  Respiratory status: acceptable, spontaneous ventilation and nonlabored ventilation  Hydration status: acceptable  Postoperative Nausea and Vomiting: none        There were no known notable events for this encounter.

## 2024-12-16 NOTE — BRIEF OP NOTE
Date: 2024  OR Location: [facility] OR    Name: Iris Barth, : 1980, Age: 44 y.o., MRN: [id number], Sex: female    Diagnosis  Pre-op Diagnosis      * Malignant neoplasm of upper-outer quadrant of left breast in female, estrogen receptor negative [C50.412, Z17.1]     * Breast asymmetry following reconstructive surgery [N65.1] Post-op Diagnosis     * Malignant neoplasm of upper-outer quadrant of left breast in female, estrogen receptor negative [C50.412, Z17.1]     * Breast asymmetry following reconstructive surgery [N65.1]     Procedures  Bilateral Breast Insertion Adipose Tissue  78626 - NE GRAFTING OF AUTOLOGOUS FAT BY LIPO 50 CC OR LESS    Left Breast Revision Reconstruction  69645 - NE REVISION OF RECONSTRUCTED BREAST      Surgeons      * Ayah Edgar - Primary    Resident/Fellow/Other Assistant:  Surgeons and Role:  * No surgeons found with a matching role *    Staff:   Surgical Assistant:   Circulator: Asmita  Circulator: Vaishnavi Singleton Person: Itz Singleton Person: Iftikhar Mayer Circulator: Maria Esther Mayer Scrub: Isabel    Anesthesia Staff: Anesthesiologist: Michael Daniels MD  CRNA: TORO GarnerCRNA; SALOMON Reinoso  SRNA: Mary Jo Ruiz    Procedure Summary  Anesthesia: General  ASA: II  Estimated Blood Loss: 20mL  Intra-op Medications:   Administrations occurring from 0730 to 1030 on 24:   Medication Name Total Dose   sodium chloride 0.9 % irrigation solution 1,000 mL   BUPivacaine-EPINEPHrine (Marcaine w/EPI) 51 mL in sodium chloride 0.9% 100 mL syringe 151 mL   EPINEPHrine (Adrenalin) 1 mg, lidocaine (Xylocaine) 20 mg/mL (2 %) 25 mL, sodium bicarbonate 1 mEq/mL (8.4 %) 2.5 mL, tranexamic acid (Cyklokapron) 10 mg in lactated Ringer's 1,000 mL irrigation 2,000 mL   ceFAZolin (Ancef) vial 1 g 2 g   dexAMETHasone (Decadron) injection 4 mg/mL 8 mg   fentaNYL (Sublimaze) injection 50 mcg/mL 100 mcg   HYDROmorphone (Dilaudid) injection 1 mg/mL 0.3 mg   lactated  Ringer's infusion Cannot be calculated   lidocaine (Xylocaine) injection 2 % 90 mL   midazolam PF (Versed) injection 1 mg/mL 2 mg   phenylephrine 100 mcg/mL syringe 10 mL (prefilled) 220 mcg   propofol (Diprivan) injection 10 mg/mL 200 mg   rocuronium (ZeMuron) 50 mg/5 mL injection 140 mg              Anesthesia Record               Intraprocedure I/O Totals          Intake    lactated Ringer's 900.00 mL    Total Intake 900 mL       Output    Est. Blood Loss 75 mL    Total Output 75 mL       Net    Net Volume 825 mL          Specimen: No specimens collected               Findings: Revision of left breast including removal of excess skin. Bilateral autologous adipose grafting.    Complications:  None; patient tolerated the procedure well.     Disposition: PACU - hemodynamically stable.  Condition: stable  Specimens Collected: No specimens collected  Attending Attestation:     Ayah Edgar  Phone Number: [phone]

## 2024-12-22 NOTE — PROGRESS NOTES
PLASTIC SURGERY CLINIC VISIT  POSTOP BREAST RECONSTRUCTION     Date: 12/23/24  Date of Surgery: 12/16/24  Surgical Procedure: Left Breast Reconstruction Revision with Bilateral Fat Grafting         HPI:   Iris Barth 44 y.o. female is here for post-operative appointment for the above procedure(s).      Interval changes as of this date:   12/23: Doing well overall. Pain is well controlled. DIANNA drain with SS output *** 30 ml per day for three consecutive days      MEDICATIONS    Current Outpatient Medications:     cetirizine (ZyrTEC) 10 mg tablet, Take 1 tablet (10 mg) by mouth once daily as needed for allergies., Disp: , Rfl:     cholecalciferol (Vitamin D-3) 25 MCG (1000 UT) tablet, Take 1 tablet (25 mcg) by mouth once daily., Disp: , Rfl:     cyanocobalamin, vitamin B-12, (VITAMIN B-12 ORAL), Take 1,000 mcg by mouth once daily., Disp: , Rfl:     diazePAM (Valium) 5 mg tablet, Take 1 tablet (5 mg) by mouth 1 time for 1 dose., Disp: 1 tablet, Rfl: 0    fluticasone (Flonase) 50 mcg/actuation nasal spray, Administer 1 spray into each nostril once daily as needed for rhinitis or allergies. Shake gently. Before first use, prime pump. After use, clean tip and replace cap., Disp: , Rfl:     gabapentin (Neurontin) 300 mg capsule, Take 1 capsule (300 mg) by mouth every 8 hours for 14 days. (Patient not taking: Reported on 12/16/2024), Disp: 42 capsule, Rfl: 0    ibuprofen-diphenhydramine cit (Advil PM) 200-38 mg tablet per tablet, Take 400 mg by mouth 1 time., Disp: , Rfl:     LORazepam (Ativan) 0.5 mg tablet, Take 2 tablets (1 mg) by mouth 3 times a day as needed (anxiety)., Disp: , Rfl:     multivitamin-children's (Cerovite, Jr) chewable tablet, Chew 2 tablets once daily. Flintstones chewable, Disp: , Rfl:     semaglutide, weight loss, (Wegovy) 2.4 mg/0.75 mL pen injector, Inject 2.4 mg under the skin every 7 days., Disp: 9 mL, Rfl: 3    tamoxifen (Nolvadex) 20 mg tablet, Take 1 tablet (20 mg total) by mouth  once daily.  Take with water or any other nonalcoholic drink with or without food at around the same time(s) every day. (Patient not taking: Reported on 12/16/2024), Disp: 90 tablet, Rfl: 1    zinc acetate 50 mg (zinc) capsule, Take 1 tablet by mouth once daily., Disp: , Rfl:       OBJECTIVE [x]Expand by Default  Last menstrual period 10/16/2024.     REVIEW OF SYSTEMS:    Constitutional: negative for fevers, chills, unintentional weight loss  HEENT: negative for changes in vision, headaches, changes in hearing, congestion, sore throat  Cardiovascular: negative for chest pain, palpitations  Respiratory: negative for cough, shortness of breath  Gastrointestinal: negative for nausea, vomiting, diarrhea  Genitourinary: negative for dysuria, hematuria  Musculoskeletal: negative for joint swelling or pain  Skin: negative for rashes or lesions  Psych: negative for depression, anxiety  Endocrine: negative for polyuria, polydipsia, cold/heat intolerance  Hem/Lymph: negative for bleeding disorder     PHYSICAL EXAM  General: alert and oriented, no apparent distress    Focused exam of the breasts:  Right: ***  Left: ***      ASSESSMENT/PLAN  Iris Barth 44 y.o. female who had Left Breast Reconstruction Revision with Bilateral Fat Grafting on 12/16/24 who presents for POV.    Prevena vac in place. Removed at this visit.    DIANNA drain(s) in place. No erythema or edema surrounding the drain site. There is serous output from the drain. Patient recorded output of the drains showed 2 consecutive days of less than 30cc output at time of removal. Patient was educated on purpose of surgical drains and informed of risk for seroma post drain removal.       DIANNA drain(s) removed at this visit: ***     ***       RTC ***

## 2024-12-23 ENCOUNTER — APPOINTMENT (OUTPATIENT)
Dept: PLASTIC SURGERY | Facility: CLINIC | Age: 44
End: 2024-12-23
Payer: COMMERCIAL

## 2024-12-23 VITALS
SYSTOLIC BLOOD PRESSURE: 121 MMHG | DIASTOLIC BLOOD PRESSURE: 86 MMHG | BODY MASS INDEX: 30.18 KG/M2 | RESPIRATION RATE: 20 BRPM | HEART RATE: 81 BPM | WEIGHT: 187 LBS

## 2024-12-23 DIAGNOSIS — Z17.1 MALIGNANT NEOPLASM OF UPPER-OUTER QUADRANT OF LEFT BREAST IN FEMALE, ESTROGEN RECEPTOR NEGATIVE: Primary | ICD-10-CM

## 2024-12-23 DIAGNOSIS — N65.1 BREAST ASYMMETRY FOLLOWING RECONSTRUCTIVE SURGERY: ICD-10-CM

## 2024-12-23 DIAGNOSIS — C50.412 MALIGNANT NEOPLASM OF UPPER-OUTER QUADRANT OF LEFT BREAST IN FEMALE, ESTROGEN RECEPTOR NEGATIVE: Primary | ICD-10-CM

## 2024-12-23 PROCEDURE — 99024 POSTOP FOLLOW-UP VISIT: CPT

## 2024-12-23 ASSESSMENT — PAIN SCALES - GENERAL: PAINLEVEL_OUTOF10: 2

## 2024-12-23 NOTE — OP NOTE
Bilateral Breast Insertion Adipose Tissue (B), Left Breast Revision Reconstruction (L) Operative Note     Date: 2024  OR Location: [facility]    Name: Iris Barth, : 1980, Age: 44 y.o., MRN: [id number], Sex: female    Diagnosis  Pre-op Diagnosis      * Malignant neoplasm of upper-outer quadrant of left breast in female, estrogen receptor negative [C50.412, Z17.1]     * Breast asymmetry following reconstructive surgery [N65.1] Post-op Diagnosis     * Malignant neoplasm of upper-outer quadrant of left breast in female, estrogen receptor negative [C50.412, Z17.1]     * Breast asymmetry following reconstructive surgery [N65.1]     Procedures  Bilateral Breast Insertion Adipose Tissue 460 ml total  99019 - AK GRAFTING OF AUTOLOGOUS FAT BY LIPO 50 CC OR LESS  15772 x 9    Left Breast Revision Reconstruction  50974 - AK REVISION OF RECONSTRUCTED BREAST    Surgeons      * Ayah Edgar - Primary    Resident/Fellow/Other Assistant:  Surgeons and Role:  * No surgeons found with a matching role *    Staff:   Surgical Assistant:   Circulator: Asmita  Circulator: Vaishnavi Singleton Person: Itz Singleton Person: Iftikhar Mayer Circulator: Maria Esther Mayer Scrub: Isabel    Anesthesia Staff: Anesthesiologist: Michael Daniels MD  CRNA: SALOMON Garner; SALOMON Reinoso  SRNA: Mary Jo Ruiz    Procedure Summary  Anesthesia: General  ASA: II  Estimated Blood Loss: 75mL  Intra-op Medications:   Administrations occurring from 0730 to 1030 on 24:   Medication Name Total Dose   sodium chloride 0.9 % irrigation solution 1,000 mL   BUPivacaine-EPINEPHrine (Marcaine w/EPI) 51 mL in sodium chloride 0.9% 100 mL syringe 151 mL   EPINEPHrine (Adrenalin) 1 mg, lidocaine (Xylocaine) 20 mg/mL (2 %) 25 mL, sodium bicarbonate 1 mEq/mL (8.4 %) 2.5 mL, tranexamic acid (Cyklokapron) 10 mg in lactated Ringer's 1,000 mL irrigation 2,000 mL   ceFAZolin (Ancef) vial 1 g 2 g   dexAMETHasone (Decadron) injection 4  mg/mL 8 mg   fentaNYL (Sublimaze) injection 50 mcg/mL 100 mcg   HYDROmorphone (Dilaudid) injection 1 mg/mL 0.3 mg   lactated Ringer's infusion Cannot be calculated   lidocaine (Xylocaine) injection 2 % 90 mL   midazolam PF (Versed) injection 1 mg/mL 2 mg   phenylephrine 100 mcg/mL syringe 10 mL (prefilled) 220 mcg   propofol (Diprivan) injection 10 mg/mL 200 mg   rocuronium (ZeMuron) 50 mg/5 mL injection 140 mg              Anesthesia Record               Intraprocedure I/O Totals          Intake    lactated Ringer's 900.00 mL    Total Intake 900 mL       Output    Est. Blood Loss 75 mL    Total Output 75 mL       Net    Net Volume 825 mL          Indications: Iris Barth is an 44 y.o. female who is having surgery for   Malignant neoplasm of upper-outer quadrant of left breast in female, estrogen receptor negative (Multi) [C50.412, Z17.1]  Breast asymmetry following reconstructive surgery [N65.1].  She has a history of bilateral mastectomies with a prepectoral tissue expanders placed.  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  She then had an exchange of the left tissue expander to an implant.  She is now ready for the next stage of her reconstruction which will include fat  grafting to the left side and revision of the left breast envelope to tighten the lower pole to hold the implant more superiorly to help fill the superior pole as well as fat graft the right chest wall to thicken the tissue to allow for expansion.      The patient was seen in the preoperative area. The risks, benefits, complications, treatment options, non-operative alternatives, expected recovery and outcomes were discussed with the patient. The risks of the procedure were discussed with her prior to surgery.  These include but are not limited to the risks of anesthesia, infection, bleeding, pain, need for further procedures, hematoma, seroma, wound healing complications, and asymmetry. We also discussed the potential risks for  fat grafting that include and are not limited to the risks of resorption, fat necrosis, oil cysts, need for further procedures and/or imaging.The possibilities of reaction to medication, pulmonary aspiration, injury to surrounding structures, bleeding, recurrent infection, the need for additional procedures, failure to diagnose a condition, and creating a complication requiring transfusion or operation were discussed with the patient. The patient concurred with the proposed plan, giving informed consent.  The site of surgery was properly noted/marked if necessary per policy. The patient has been actively warmed in preoperative area. Preoperative antibiotics have been ordered and given within 1 hours of incision. Venous thrombosis prophylaxis have been ordered including bilateral sequential compression devices    Procedure Details: The patient was identified and marked in the upright and standing position.  She was taken to the operative room and placed in the supine position.  A preoperative time was performed identifying the patient, procedeure and laterality.  An atraumatic endotracheal intubation was performed by the anesthesia team.  Her arms were padded and carefully secured to the arm boards, abducted less than 90 degrees. She was prepped and draped in the usual sterile fashion.     I first prepared for fat graft harvesting by placing tumescence in the donor sites.  These were her bilateral flanks and anterior abdomen. The tumescence consisted of 25 cc of 2% lidocaine, 1 cc of epinephrine, 1 g of TXA, and 2.5 cc of sodium bicarb in a L of LR. A total of 2 L of tumescence was used.      While the tumescent took effect I turned my attention to left breast.  Using 2-0 nylon, I tailor tacked the vertical and horizontal laxity of the implant envelope.  I marked and carefully de-epithelialized the areas with a 10 blade and imbricated and closed the tissues with 3-0 Monocryl interrupted buried dermals.   The vertical  component measured 11 cm x 6 cm and the inferior transverse portion measured 14 cm x 3 cm.          Now I turned my attention to harvesting the fat graft.  Using the Microair and power assisted lipectomy, I harvested fat from the donor sites. The fat was then filtrated using the PureGraft system and washed with Normal Saline.  The fat was then placed in microaliquots using Bhatti catheters in a three dimensional lattice.       The amounts were:  Location  Right chest Left breast  Inferior medial  30 ml  30 ml  Medial   30 ml  50 ml  Superior medial 10 ml  50 ml  Superior  30 ml  70 ml  Central   50 ml  20 ml  Superior lateral 30 ml  30 ml  Lateral    30 ml  0  Total   210 ml  250 ml    The fat grafting access sites were freshened with sharp excision of skin and subcutaneous fat. This was closed with 3-0 Monocryl interrupted buried dermals and 5-0 prolene interrupted.  Closure of the vertical incision of the left breast was completed with a running 4-0 Monocryl subcuticular.    51 cc of 0.5% Marcaine with epinephrine was diluted with 100 cc of normal saline.   I used  this mixture to place blocks around the left breast, right chest, and the donor sites for post operative pain control.  The incisions were dressed with mupirocin, xeroform, kerlix fluffs, and ABD pads.  The donor sites were then dressed with foam followed by supportive operative bra and abdominal compression.  The patient was then woken, extubated and taken to the recovery room in good condition.    Complications:  None; patient tolerated the procedure well.    Disposition: PACU - hemodynamically stable.  Condition: stable     Attending Attestation: I performed the procedure.    Ayah Edgar  Phone Number: [phone]

## 2024-12-23 NOTE — PROGRESS NOTES
PLASTIC SURGERY CLINIC VISIT  POSTOP BREAST RECONSTRUCTION     Date: 12/23/24  Date of Surgery: 12/16/24  Surgical Procedure: Left Breast Reconstruction Revision with Bilateral Fat Grafting         HPI:   Iris Barth 44 y.o. female is here for post-operative appointment for the above procedure(s).      Interval changes as of this date:   12/23: Doing well overall. Incisions healing nicely with very minimal bruising. 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 Will wear padding bilateral breasts along inferior bra band     MEDICATIONS    Current Outpatient Medications:     cetirizine (ZyrTEC) 10 mg tablet, Take 1 tablet (10 mg) by mouth once daily as needed for allergies., Disp: , Rfl:     cholecalciferol (Vitamin D-3) 25 MCG (1000 UT) tablet, Take 1 tablet (25 mcg) by mouth once daily., Disp: , Rfl:     cyanocobalamin, vitamin B-12, (VITAMIN B-12 ORAL), Take 1,000 mcg by mouth once daily., Disp: , Rfl:     diazePAM (Valium) 5 mg tablet, Take 1 tablet (5 mg) by mouth 1 time for 1 dose., Disp: 1 tablet, Rfl: 0    fluticasone (Flonase) 50 mcg/actuation nasal spray, Administer 1 spray into each nostril once daily as needed for rhinitis or allergies. Shake gently. Before first use, prime pump. After use, clean tip and replace cap., Disp: , Rfl:     gabapentin (Neurontin) 300 mg capsule, Take 1 capsule (300 mg) by mouth every 8 hours for 14 days. (Patient not taking: Reported on 12/16/2024), Disp: 42 capsule, Rfl: 0    ibuprofen-diphenhydramine cit (Advil PM) 200-38 mg tablet per tablet, Take 400 mg by mouth 1 time., Disp: , Rfl:     LORazepam (Ativan) 0.5 mg tablet, Take 2 tablets (1 mg) by mouth 3 times a day as needed (anxiety)., Disp: , Rfl:     multivitamin-children's (Cerovite, Jr) chewable tablet, Chew 2 tablets once daily. Flintstones chewable, Disp: , Rfl:     semaglutide, weight loss, (Wegovy) 2.4 mg/0.75 mL pen injector, Inject 2.4 mg under the skin every 7 days., Disp: 9 mL, Rfl: 3     tamoxifen (Nolvadex) 20 mg tablet, Take 1 tablet (20 mg total) by mouth once daily.  Take with water or any other nonalcoholic drink with or without food at around the same time(s) every day. (Patient not taking: Reported on 12/16/2024), Disp: 90 tablet, Rfl: 1    zinc acetate 50 mg (zinc) capsule, Take 1 tablet by mouth once daily., Disp: , Rfl:       OBJECTIVE [x]Expand by Default  Last menstrual period 10/16/2024.     REVIEW OF SYSTEMS:    Constitutional: negative for fevers, chills, unintentional weight loss  HEENT: negative for changes in vision, headaches, changes in hearing, congestion, sore throat  Cardiovascular: negative for chest pain, palpitations  Respiratory: negative for cough, shortness of breath  Gastrointestinal: negative for nausea, vomiting, diarrhea  Genitourinary: negative for dysuria, hematuria  Musculoskeletal: negative for joint swelling or pain  Skin: negative for rashes or lesions  Psych: negative for depression, anxiety  Endocrine: negative for polyuria, polydipsia, cold/heat intolerance  Hem/Lymph: negative for bleeding disorder     PHYSICAL EXAM  General: alert and oriented, no apparent distress    Focused exam of the breasts:  Right: s/p mastectomy, small fat grafting incisions intact, sutures x3 removed during today's visit  Left: s/p reconstruction. Incision C/D/I, no erythema or tenderness, soft. Minimal yellow-kareem discoloration bruising.       ASSESSMENT/PLAN  Iris FLORENTINO Barth 44 y.o. female who had Left Breast Reconstruction Revision with Bilateral Fat Grafting on 12/23/24 who presents for POV.       Incisions covered with steri strips, to remain in place until self removal  Okay to shower, no scrubbing or soaking incisions  Please wear pink surgical bra with padding under inferior bra band at all times to protect incisions and optimize fat grafting success  Continue to limit activity with restrictions  Continue to aim for 120g of protein daily to optimize continued wound  healing      RTC in 2 weeks    Patient and plan discussed with Dr. Tala Swift, PA-C  Plastic and Reconstructive Surgery

## 2025-01-02 NOTE — PROGRESS NOTES
PLASTIC SURGERY CLINIC VISIT  POSTOP BREAST RECONSTRUCTION     Date: 1/7/25  Date of Surgery: 12/16/24  Surgical Procedure: Left Breast Reconstruction Revision with Bilateral Fat Grafting         HPI:   Iris Barth 44 y.o. female is here for post-operative appointment for the above procedure(s).      Interval changes as of this date:   12/23: Doing well overall. Incisions healing nicely with very minimal bruising. 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      MEDICATIONS    Current Outpatient Medications:     cetirizine (ZyrTEC) 10 mg tablet, Take 1 tablet (10 mg) by mouth once daily as needed for allergies., Disp: , Rfl:     cholecalciferol (Vitamin D-3) 25 MCG (1000 UT) tablet, Take 1 tablet (25 mcg) by mouth once daily., Disp: , Rfl:     cyanocobalamin, vitamin B-12, (VITAMIN B-12 ORAL), Take 1,000 mcg by mouth once daily., Disp: , Rfl:     fluticasone (Flonase) 50 mcg/actuation nasal spray, Administer 1 spray into each nostril once daily as needed for rhinitis or allergies. Shake gently. Before first use, prime pump. After use, clean tip and replace cap., Disp: , Rfl:     LORazepam (Ativan) 0.5 mg tablet, Take 2 tablets (1 mg) by mouth 3 times a day as needed (anxiety)., Disp: , Rfl:     multivitamin-children's (Cerovite, Jr) chewable tablet, Chew 2 tablets once daily. Flintstones chewable, Disp: , Rfl:     semaglutide, weight loss, (Wegovy) 2.4 mg/0.75 mL pen injector, Inject 2.4 mg under the skin every 7 days., Disp: 9 mL, Rfl: 3    tamoxifen (Nolvadex) 20 mg tablet, Take 1 tablet (20 mg total) by mouth once daily.  Take with water or any other nonalcoholic drink with or without food at around the same time(s) every day., Disp: 90 tablet, Rfl: 1    zinc acetate 50 mg (zinc) capsule, Take 1 tablet by mouth once daily., Disp: , Rfl:     diazePAM  (Valium) 5 mg tablet, Take 1 tablet (5 mg) by mouth 1 time for 1 dose., Disp: 1 tablet, Rfl: 0    gabapentin (Neurontin) 300 mg capsule, Take 1 capsule (300 mg) by mouth every 8 hours for 14 days. (Patient not taking: Reported on 12/16/2024), Disp: 42 capsule, Rfl: 0    ibuprofen-diphenhydramine cit (Advil PM) 200-38 mg tablet per tablet, Take 400 mg by mouth 1 time. (Patient not taking: Reported on 1/7/2025), Disp: , Rfl:       OBJECTIVE [x]Expand by Default  Blood pressure 120/85, pulse 92, temperature 37.1 °C (98.8 °F), temperature source Temporal, last menstrual period 10/16/2024, SpO2 100%.     REVIEW OF SYSTEMS:    Constitutional: negative for fevers, chills, unintentional weight loss  HEENT: negative for changes in vision, headaches, changes in hearing, congestion, sore throat  Cardiovascular: negative for chest pain, palpitations  Respiratory: negative for cough, shortness of breath  Gastrointestinal: negative for nausea, vomiting, diarrhea  Genitourinary: negative for dysuria, hematuria  Musculoskeletal: negative for joint swelling or pain  Skin: negative for rashes or lesions  Psych: negative for depression, anxiety  Endocrine: negative for polyuria, polydipsia, cold/heat intolerance  Hem/Lymph: negative for bleeding disorder     PHYSICAL EXAM  General: alert and oriented, no apparent distress    Focused exam of the breasts:  Right: s/p mastectomy, small fat grafting incisions intact  Left: s/p reconstruction. Incision C/D/I,   Bruising from liposuction has significantly improved as things continue to heal.         ASSESSMENT/PLAN  Iris Barth 44 y.o. female who had Left Breast Reconstruction Revision with Bilateral Fat Grafting on 12/23/24 who presents for POV.       Incisions covered with steri strips, to remain in place until self removal  Okay to shower, no scrubbing or soaking incisions  Please wear pink surgical bra with padding under inferior bra band at all times to protect incisions and  optimize fat grafting success  Continue to limit activity with restrictions  Continue to aim for 120g of protein daily to optimize continued wound healing   Advise to use liposuction foam  The next step will be TE vs DTI at least 12 weeks from her last surgery    RTC in 6 weeks to discuss surgery date planning after March 10th    Scribe Attestation  By signing my name below, EMMY Moraima AwildaashlynJd, attest that this documentation has been prepared under the direction and in the presence of Ayah Edgar MD. Verbal consent obtained from the patient.    Attending Attestation:  Ayah BOOTH MD, personal performed the history, exam, and decision making on this patient.

## 2025-01-07 ENCOUNTER — APPOINTMENT (OUTPATIENT)
Dept: PLASTIC SURGERY | Facility: CLINIC | Age: 45
End: 2025-01-07
Payer: COMMERCIAL

## 2025-01-07 VITALS
OXYGEN SATURATION: 100 % | DIASTOLIC BLOOD PRESSURE: 85 MMHG | HEART RATE: 92 BPM | SYSTOLIC BLOOD PRESSURE: 120 MMHG | TEMPERATURE: 98.8 F

## 2025-01-07 DIAGNOSIS — N65.1 BREAST ASYMMETRY FOLLOWING RECONSTRUCTIVE SURGERY: Primary | ICD-10-CM

## 2025-01-07 PROCEDURE — 99024 POSTOP FOLLOW-UP VISIT: CPT | Performed by: SURGERY

## 2025-01-08 ENCOUNTER — APPOINTMENT (OUTPATIENT)
Dept: SURGICAL ONCOLOGY | Facility: CLINIC | Age: 45
End: 2025-01-08
Payer: COMMERCIAL

## 2025-01-18 ENCOUNTER — TELEPHONE (OUTPATIENT)
Dept: PLASTIC SURGERY | Facility: HOSPITAL | Age: 45
End: 2025-01-18
Payer: COMMERCIAL

## 2025-01-19 NOTE — TELEPHONE ENCOUNTER
S/p Left breast reconstruction Revision with bilateral fat grafting on 12/16/24. Last seen in clinic on 1/7/2025. No concerns at this time.    Iris called the answering service on 1/18/2025 for concerns of infection of her abdominal incision. She describes previously removing the  abdominal dressing coverings of the fat grafting sites. She states as she was removing this bandage, she felt a scab rip off and noted some clear/bloody fluid come out.  She denies fevers, chills, N/V/D, copious fluid output, skin changes/rashes, increased pain or fluid fluctuation in abdomen. I educated Iris that her findings were not consistent with infection or wound dehiscence. I reassured her that minimal drainage from the abdominal sites is normal and not definitive for infection. Educated her to keep abdominal site, clean and dry and reinforce her light activity and abdominal binder to minimize fluid.     Reviewed that fevers, chills, increased pain, skin changes/rashes can be concerning for infection and she should notify our office immediately for instruction and prompt evaluation.     She was satisfied with call and had no further questions or concerns at this time.     Julianne Thomson PA-C  Plastic and Reconstructive Surgery  1/18/2025  9:51PM

## 2025-01-20 NOTE — PROGRESS NOTES
PLASTIC SURGERY CLINIC VISIT  POSTOP BREAST RECONSTRUCTION     Date: 1/21/25  Date of Surgery: 12/16/24  Surgical Procedure: Left Breast Reconstruction Revision with Bilateral Fat Grafting         HPI:   Iris Barth 44 y.o. female is here for post-operative appointment for the above procedure(s).      Interval changes as of this date:   12/23: Doing well overall. Incisions healing nicely with very minimal bruising. 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    1/21 Pt called answering service (1/18/25) and RN (1/20/25) regarding abdominal incision of donor site. She is here today for evaluation     MEDICATIONS    Current Outpatient Medications:     cetirizine (ZyrTEC) 10 mg tablet, Take 1 tablet (10 mg) by mouth once daily as needed for allergies., Disp: , Rfl:     cholecalciferol (Vitamin D-3) 25 MCG (1000 UT) tablet, Take 1 tablet (25 mcg) by mouth once daily., Disp: , Rfl:     cyanocobalamin, vitamin B-12, (VITAMIN B-12 ORAL), Take 1,000 mcg by mouth once daily., Disp: , Rfl:     diazePAM (Valium) 5 mg tablet, Take 1 tablet (5 mg) by mouth 1 time for 1 dose., Disp: 1 tablet, Rfl: 0    fluticasone (Flonase) 50 mcg/actuation nasal spray, Administer 1 spray into each nostril once daily as needed for rhinitis or allergies. Shake gently. Before first use, prime pump. After use, clean tip and replace cap., Disp: , Rfl:     gabapentin (Neurontin) 300 mg capsule, Take 1 capsule (300 mg) by mouth every 8 hours for 14 days. (Patient not taking: Reported on 12/16/2024), Disp: 42 capsule, Rfl: 0    ibuprofen-diphenhydramine cit (Advil PM) 200-38 mg tablet per tablet, Take 400 mg by mouth 1 time. (Patient not taking: Reported on 1/7/2025), Disp: , Rfl:     LORazepam (Ativan) 0.5 mg tablet, Take 2 tablets (1 mg) by mouth 3 times a day as needed (anxiety)., Disp:  , Rfl:     multivitamin-children's (Cerovite, Jr) chewable tablet, Chew 2 tablets once daily. Flintstones chewable, Disp: , Rfl:     semaglutide, weight loss, (Wegovy) 2.4 mg/0.75 mL pen injector, Inject 2.4 mg under the skin every 7 days., Disp: 9 mL, Rfl: 3    tamoxifen (Nolvadex) 20 mg tablet, Take 1 tablet (20 mg total) by mouth once daily.  Take with water or any other nonalcoholic drink with or without food at around the same time(s) every day., Disp: 90 tablet, Rfl: 1    zinc acetate 50 mg (zinc) capsule, Take 1 tablet by mouth once daily., Disp: , Rfl:       OBJECTIVE [x]Expand by Default  There were no vitals taken for this visit.     REVIEW OF SYSTEMS:    Constitutional: negative for fevers, chills, unintentional weight loss  HEENT: negative for changes in vision, headaches, changes in hearing, congestion, sore throat  Cardiovascular: negative for chest pain, palpitations  Respiratory: negative for cough, shortness of breath  Gastrointestinal: negative for nausea, vomiting, diarrhea  Genitourinary: negative for dysuria, hematuria  Musculoskeletal: negative for joint swelling or pain  Skin: negative for rashes or lesions  Psych: negative for depression, anxiety  Endocrine: negative for polyuria, polydipsia, cold/heat intolerance  Hem/Lymph: negative for bleeding disorder     PHYSICAL EXAM  General: alert and oriented, no apparent distress    Focused exam of the breasts:  Right: s/p mastectomy, small fat grafting incisions intact   Left: s/p reconstruction. Incision C/D/I,   Bruising from liposuction has significantly improved as things continue to heal.  Cannula insertion sites with fibrinous tissue present and healing slowly           ASSESSMENT/PLAN  Iris Barth 44 y.o. female who had Left Breast Reconstruction Revision with Bilateral Fat Grafting on 12/23/24 who presents for POV.       Left Breast Incisions covered with steri strips, to remain in place until self removal  Okay to shower, no  scrubbing or soaking incisions  Please wear pink surgical bra  to protect incisions and optimize fat grafting success  For Abdominal cannula insertion sights, please continue Aqua cell AG with Mepilex dressing on top. Allow to be open to air when in bed to help with the drying aspect. Please discontinue your corset with bones in place and wear your underwear compression garment which is more breathable and white sponge supplied to place at the anterior aspect to assist with compression  Continue to limit activity with restrictions  Continue to aim for 120g of protein daily to optimize continued wound healing     It was a pleasure to see you. Please RTC as scheduled 2/18 or sooner as needed

## 2025-01-21 ENCOUNTER — OFFICE VISIT (OUTPATIENT)
Dept: PLASTIC SURGERY | Facility: CLINIC | Age: 45
End: 2025-01-21
Payer: COMMERCIAL

## 2025-01-21 VITALS
BODY MASS INDEX: 30.18 KG/M2 | DIASTOLIC BLOOD PRESSURE: 80 MMHG | HEART RATE: 84 BPM | HEIGHT: 66 IN | SYSTOLIC BLOOD PRESSURE: 122 MMHG

## 2025-01-21 DIAGNOSIS — Z17.0 MALIGNANT NEOPLASM OF OVERLAPPING SITES OF LEFT BREAST IN FEMALE, ESTROGEN RECEPTOR POSITIVE: Primary | ICD-10-CM

## 2025-01-21 DIAGNOSIS — C50.812 MALIGNANT NEOPLASM OF OVERLAPPING SITES OF LEFT BREAST IN FEMALE, ESTROGEN RECEPTOR POSITIVE: Primary | ICD-10-CM

## 2025-01-21 PROCEDURE — 99024 POSTOP FOLLOW-UP VISIT: CPT | Performed by: PHYSICIAN ASSISTANT

## 2025-01-21 PROCEDURE — 1036F TOBACCO NON-USER: CPT | Performed by: PHYSICIAN ASSISTANT

## 2025-01-27 ENCOUNTER — OFFICE VISIT (OUTPATIENT)
Dept: HEMATOLOGY/ONCOLOGY | Facility: CLINIC | Age: 45
End: 2025-01-27
Payer: COMMERCIAL

## 2025-01-27 VITALS
RESPIRATION RATE: 16 BRPM | SYSTOLIC BLOOD PRESSURE: 121 MMHG | DIASTOLIC BLOOD PRESSURE: 87 MMHG | TEMPERATURE: 97 F | HEART RATE: 91 BPM | OXYGEN SATURATION: 98 %

## 2025-01-27 DIAGNOSIS — Z17.0 MALIGNANT NEOPLASM OF OVERLAPPING SITES OF LEFT BREAST IN FEMALE, ESTROGEN RECEPTOR POSITIVE: ICD-10-CM

## 2025-01-27 DIAGNOSIS — C50.812 MALIGNANT NEOPLASM OF OVERLAPPING SITES OF LEFT BREAST IN FEMALE, ESTROGEN RECEPTOR POSITIVE: ICD-10-CM

## 2025-01-27 PROCEDURE — 99215 OFFICE O/P EST HI 40 MIN: CPT | Performed by: STUDENT IN AN ORGANIZED HEALTH CARE EDUCATION/TRAINING PROGRAM

## 2025-01-27 RX ORDER — TAMOXIFEN CITRATE 20 MG/1
20 TABLET ORAL DAILY
Qty: 90 TABLET | Refills: 3 | Status: SHIPPED | OUTPATIENT
Start: 2025-01-27

## 2025-01-27 ASSESSMENT — PAIN SCALES - GENERAL: PAINLEVEL_OUTOF10: 0-NO PAIN

## 2025-01-27 NOTE — PROGRESS NOTES
Breast Medical Oncology Clinic  Location: [place]    Visit Type: Follow-up Visit    Oncologic History:    10/24/2023: Screening mammogram: Bilateral breast masses, one in the right and two in the left.     11/3/23: Diagnostic breast imaging: Within the right breast, at 12:00 position 6 cm from nipple, an indeterminate mass measuring 0.5 x 0.5 x 0.4 cm is identified. Within the left breast, at 2:00 position 8 cm from nipple, an indeterminate, partially circumscribed parallel hypoechoic mass measuring 0.4 x 0.3 x 0.2 cm is identified. L breast at 11:00 position 7 cm from pole, a suspicious irregular hypoechoic mass measuring 0.9 x 0.4 x 0.4 cm is identified. L breast At 1:00 position 7 cm from  the nipple, a suspicious irregular non parallel shadowing mass is identified, measuring 0.4 x 0.3 x 0.3 cm. Ultrasound examination of the left axilla demonstrates 3 borderline lymph lymph nodes with cortex measuring approximately 0.3 cm in  thickness.    11/10/23: Breast US and mammogram: Suspicious left breast masses at the 11 o'clock and 1 o'clock position without axillary lymphadenopathy. Indeterminate right breast mass without a sonographic correlate. Probably benign left breast mass at the 2 o'clock position which may represent a complicated cyst. A six-month follow-up ultrasound is recommended to document stability.    11/10/23: L breast 1:00 bx: IDC G2, Er >95%, CA> 95%, HER2 equivocal, negative by DELMY; L breast 11:00 Bx: IDC, G2-3 ER 80%, CA 80%, HER2 equivocal, negative by DELMY ; R breast bx: fibroadenomatoid nodule.     1/8/24: Bilateral mastectomy. R breast fibroadenoma. L breast 2 foci of IDC, G2, greatest dimension 11 mm, all margins negative, 5 negative LNs.     2/2/24: Genetic testing: negative    2/13/24: Started tamoxifen      Subjective: History of Present Illness    Presents today for follow-up visit.     Recent reconstruction surgery with Dr. Edgar.     Remains on tamoxifen and is tolerating it well.      No menses.     Denies weight loss, changes in the breast and/or chest wall, new aches or pains, changes in appetite or energy level.     Gynecologic History:     Age of first menses: 12 years old  ; FLB at age 25  Pre-menopausal  She did breastfeed  Uterus/Ovaries: Intact  OCP: 12 years    Pertinent Family history:    Breast Cancer:  Maternal grandmother  Ovarian Cancer: None  Pancreatic Cancer: None  Other:  Maternal grandfather with bone cancer    Social History  Iris Barth  reports that she has never smoked. She has never been exposed to tobacco smoke. She has never used smokeless tobacco.  She  reports that she does not currently use alcohol.  She  reports no history of drug use.    ROS:     Review of Systems   All other systems reviewed and are negative.       Physical Examination:    /87   Pulse 91   Temp 36.1 °C (97 °F)   Resp 16   SpO2 98%     Physical Exam  Vitals and nursing note reviewed.   Constitutional:       General: She is not in acute distress.     Appearance: Normal appearance. She is not toxic-appearing.   HENT:      Head: Normocephalic and atraumatic.   Eyes:      Conjunctiva/sclera: Conjunctivae normal.   Cardiovascular:      Rate and Rhythm: Normal rate.   Pulmonary:      Effort: Pulmonary effort is normal. No respiratory distress.   Abdominal:      General: Abdomen is flat.      Palpations: Abdomen is soft.   Musculoskeletal:         General: No swelling. Normal range of motion.      Cervical back: Normal range of motion.   Skin:     General: Skin is warm and dry.   Neurological:      General: No focal deficit present.      Mental Status: She is alert.   Psychiatric:         Mood and Affect: Mood normal.         Breast Examination:    s/p bilateral skin sparing mastectomies with fat grafting on L, TE removed on R. Well healed incisions.     ECOG Performance Status:     [x] 0 Fully active, able to carry on all pre-disease performance without restriction  [] 1  Restricted in physically strenuous activity but ambulatory and able to carry out work of a light or sedentary nature, e.g., light house work, office work  [] 2 Ambulatory and capable of all selfcare but unable to carry out any work activities; up and about more than 50% of waking hours  [] 3 Capable of only limited selfcare; confined to bed or chair more than 50% of waking hours  [] 4 Completely disabled; cannot carry on any selfcare; totally confined to bed or chair  [] 5 Dead     Results:    Labs:      Lab Results   Component Value Date    WBC 10.4 02/06/2024    HGB 13.3 02/06/2024    HCT 38.7 02/06/2024    MCV 92 02/06/2024     02/06/2024       Chemistry    Lab Results   Component Value Date/Time     02/06/2024 1720    K 3.4 (L) 02/06/2024 1720     02/06/2024 1720    CO2 23 02/06/2024 1720    BUN 19 02/06/2024 1720    CREATININE 0.78 02/06/2024 1720    Lab Results   Component Value Date/Time    CALCIUM 10.0 02/06/2024 1720    ALKPHOS 65 01/04/2024 0908    AST 21 01/04/2024 0908    ALT 23 01/04/2024 0908    BILITOT 0.4 01/04/2024 0908             Imaging:  Reviewed above in Onc History    Pathology:  Reviewed above in Onc History    Assessment:     Pathologic prognostic stage IA (pT1c(m) pN0 cM0) infiltrating ductal carcinoma of the left breast; Dx in 11/2023; Grade 2; ER positive (>95 and 80%), NC positive (>95 and 80%), HER2 negative (equivocal, neg by DELMY); Oncotype DX RS 15 ; S/p bilateral skin sparing mastectomies with placement of tissue expanders, post-op course complicated by tissue necrosis and infection. On tamoxifen since 2/2024    Iris Barth is a very pleasant 44 y.o. premenopausal female with newly diagnosed breast cancer with history of provoked DVT after Knee arthroscopy. Tolerating tamoxifen well. No evidence of breast cancer recurrence per patient history, physical examination and imaging findings.     Plan:    Surgical Plan: S/p bilateral skin sparing  mastectomies.  Additional biopsy: No further biopsy indicated  Radiation Plan: Not indicated  Additional staging scans/DEXA/echo: Staging scans not indicated based on current stage, patient history and physical examination.  Additional Path info (i.e Ki67, PDL1): Not indicated  Gene assays: Oncotype DX RS 15    Systemic treatment plan: Tamoxifen   Intent: Curative   Clinical trial: Not eligible for our current trials   Endocrine therapy: Tamoxifen   HER2 treatment: not indicated   Targeted agents: not indicated   Chemotherapy: Not indicated   BMA: Not indicated    Access: Not indicated  Supportive meds: No new supportive medications prescribed  Genetic testing: Completed; negative  Fertility preservation: Not indicated; no further child bearing plans  Other active problems/orders:     Provoked DVT is setting of knee arthroscopy: understands risk of VTE with tamoxifen and s/s to watch for and that tamoxifen needs to be stopped perioperatively in the future  We spent a portion of our encounter discussing lifestyle modifications that may help with cancer outcomes and overall wellbeing. We discussed regular exercise aiming for 30 minutes 5 times a week. We discussed diet modifications such as limiting red meat and processed foods. We also discussed limiting alcohol intake.   Anxiety: related to cancer diagnosis and ongoing care. Has great support at home. Previously discussed strategies for management and incorporating SW and referral to onco-psychiatry. Reports improved today.     Surveillance plan: No routine breast imaging indicated    Follow-up: Oct 2025    Patient expressed understanding of the plan outlined above. She had ample time to ask questions. She understands she can contact us should she have additional questions or issues arise in the interim.

## 2025-01-27 NOTE — PATIENT INSTRUCTIONS
Joni Simmons.   I'm sorry I missed you after Dr. Perez visit.  I know you had to leave.  I called your number and left a general message.  Dr. Sebastian would like to see you in 9 months. If you have any questions about your visit with Dr. Sebastian please feel free to respond to this or call the office.  Thank you.    Sheryl DAS

## 2025-01-27 NOTE — PROGRESS NOTES
Patient left building prior to this nurse discharging.  FUV in 9 months.    Unable to assess preferred lab and nutrition screening.   Called patient and no answer.  General message left on unidentifiable vm.

## 2025-02-16 NOTE — PROGRESS NOTES
PLASTIC SURGERY CLINIC VISIT  POSTOP BREAST RECONSTRUCTION     Date:  2/18/25  Date of Surgery: 12/16/24  Surgical Procedure: Left Breast Reconstruction Revision with Bilateral Fat Grafting         HPI:   Iris Barth 44 y.o. female is here for post-operative appointment for the above procedure(s).      Interval changes as of this date:   12/23: Doing well overall. Incisions healing nicely with very minimal bruising. 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    1/21 Pt called answering service (1/18/25) and RN (1/20/25) regarding abdominal incision of donor site. She is here today for evaluation   2/18 Doing well overall. Endorsed adequate protein intake and activity restrictions     MEDICATIONS    Current Outpatient Medications:     cetirizine (ZyrTEC) 10 mg tablet, Take 1 tablet (10 mg) by mouth once daily as needed for allergies., Disp: , Rfl:     cholecalciferol (Vitamin D-3) 25 MCG (1000 UT) tablet, Take 1 tablet (25 mcg) by mouth once daily., Disp: , Rfl:     cyanocobalamin, vitamin B-12, (VITAMIN B-12 ORAL), Take 1,000 mcg by mouth once daily., Disp: , Rfl:     fluticasone (Flonase) 50 mcg/actuation nasal spray, Administer 1 spray into each nostril once daily as needed for rhinitis or allergies. Shake gently. Before first use, prime pump. After use, clean tip and replace cap., Disp: , Rfl:     ibuprofen-diphenhydramine cit (Advil PM) 200-38 mg tablet per tablet, Take 400 mg by mouth 1 time., Disp: , Rfl:     LORazepam (Ativan) 0.5 mg tablet, Take 2 tablets (1 mg) by mouth 3 times a day as needed (anxiety)., Disp: , Rfl:     semaglutide, weight loss, (Wegovy) 2.4 mg/0.75 mL pen injector, Inject 2.4 mg under the skin every 7 days., Disp: 9 mL, Rfl: 3    tamoxifen (Nolvadex) 20 mg tablet, Take 1 tablet (20 mg total) by mouth once daily.  Take with  water or any other nonalcoholic drink with or without food at around the same time(s) every day., Disp: 90 tablet, Rfl: 3    zinc acetate 50 mg (zinc) capsule, Take 1 tablet by mouth once daily., Disp: , Rfl:     diazePAM (Valium) 5 mg tablet, Take 1 tablet (5 mg) by mouth 1 time for 1 dose., Disp: 1 tablet, Rfl: 0    gabapentin (Neurontin) 300 mg capsule, Take 1 capsule (300 mg) by mouth every 8 hours for 14 days. (Patient not taking: Reported on 12/16/2024), Disp: 42 capsule, Rfl: 0    multivitamin-children's (Cerovite, Jr) chewable tablet, Chew 2 tablets once daily. Flintstones chewable, Disp: , Rfl:       OBJECTIVE [x]Expand by Default  Blood pressure 124/85, pulse 87, temperature 36.3 °C (97.3 °F), temperature source Temporal.     REVIEW OF SYSTEMS:    Constitutional: negative for fevers, chills, unintentional weight loss  HEENT: negative for changes in vision, headaches, changes in hearing, congestion, sore throat  Cardiovascular: negative for chest pain, palpitations  Respiratory: negative for cough, shortness of breath  Gastrointestinal: negative for nausea, vomiting, diarrhea  Genitourinary: negative for dysuria, hematuria  Musculoskeletal: negative for joint swelling or pain  Skin: negative for rashes or lesions  Psych: negative for depression, anxiety  Endocrine: negative for polyuria, polydipsia, cold/heat intolerance  Hem/Lymph: negative for bleeding disorder     PHYSICAL EXAM  General: alert and oriented, no apparent distress    Focused exam of the breasts:  Right: s/p mastectomy, small fat grafting incisions intact   Left: s/p reconstruction. Incision C/D/I,     Abdomen; developed some laxity, striae.      ASSESSMENT/PLAN  Iris FLORENTINO Barth 44 y.o. female who had Left Breast Reconstruction Revision with Bilateral Fat Grafting on 12/23/24 who presents for POV. She is doing well, nicely healed. I recommend wearing compression for support. She would like her revision sometime in May/2025. I recommend  implant vs TE, given her ptosis and a vertical axis, can consider doing an inferior crescentic wedge, de-epithelialize and closure.     Will schedule her surgery on May 21/25 at [facility], will see her back on 4/15/25 for pre op.     Scribe Attestation  By signing my name below, EMMY Semajclayton Ricardo, Jd, attest that this documentation has been prepared under the direction and in the presence of Ayah Edgar MD. Verbal consent obtained from the patient.      Attending Attestation:  IAyah MD, personal performed the history, exam, and decision making on this patient.

## 2025-02-17 PROBLEM — Z13.71 BRCA NEGATIVE: Status: ACTIVE | Noted: 2025-02-17

## 2025-02-17 NOTE — PROGRESS NOTES
Iris Barth female   1980 44 y.o.  [id number]      Chief Complaint  Follow up clinical exam, history left breast cancer.    History Of Present Illness  Iris Barth is a very pleasant 44 y.o.  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 She had post op complications by tissue necrosis and infection. She completed genetic testing which was negative on 24. 2024 She had removal of right tissue expander with Dr. Bright. She started Tamoxifen 2024 with Dr. Benavidez and tolerating it well. On 24 Dr. Edgar left breast tissue expander exchange for implant with bilateral breast fat grafting and 24 for left breast reconstruction revision and bilateral fat grafting. She has family history of breast cancer in her maternal grandmother. She presents today for annual clinical exam.  Stage IA, [id number]    REPRODUCTIVE HISTORY: menarche age 12, , first birth age 25,  x 1 year, OCP's x 12 years, perimenopausal, LMP 2/15/25 and sporadic, scattered fibroglandular tissue                                FAMILY CANCER HISTORY:   Maternal Grandmother: Breast cancer, unknown age  Maternal Grandfather: Unknown cancer    Review of Systems  Constitutional:  Negative for appetite change, fatigue, fever and unexpected weight change.   HENT:  Negative for ear pain, hearing loss, nosebleeds, sore throat and trouble swallowing.    Eyes:  Negative for discharge, itching and visual disturbance.   Breast: As stated in HPI.  Respiratory:  Negative for cough, chest tightness and shortness of breath.    Cardiovascular:  Negative for chest pain, palpitations and  leg swelling.   Gastrointestinal:  Negative for abdominal pain, constipation, diarrhea and nausea.   Endocrine: Negative for cold intolerance and heat intolerance.   Genitourinary:  Negative for dysuria, frequency, hematuria, pelvic pain and vaginal bleeding.   Musculoskeletal:  Negative for arthralgias, back pain, gait problem, joint swelling and myalgias.   Skin:  Negative for color change and rash.   Allergic/Immunologic: Negative for environmental allergies and food allergies.   Neurological:  Negative for dizziness, tremors, speech difficulty, weakness, numbness and headaches.   Hematological:  Does not bruise/bleed easily.   Psychiatric/Behavioral:  Negative for agitation, dysphoric mood and sleep disturbance. The patient is not nervous/anxious.            Past Medical History  She has a past medical history of Abnormal finding on breast imaging (11/07/2023), Acid reflux, Anxiety, Breast CA (Multi), COVID-19 (12/09/2023), DVT (deep vein thrombosis) in pregnancy ([facility]), and Heart murmur.    Surgical History  She has a past surgical history that includes BI US guided breast localization and biopsy left (Left, 11/10/2023); BI stereotactic guided breast right localization and biopsy (Right, 11/10/2023); Belt abdominoplasty; Mastectomy (Bilateral, 01/08/2024); Breast cyst incision and drainage (Left); and Knee arthroscopy w/ debridement.    Family History  Cancer-related family history includes Breast cancer in her maternal grandmother and paternal grandmother; Cancer in an other family member; Skin cancer in her father.     Social History  She reports that she has never smoked. She has never been exposed to tobacco smoke. She has never used smokeless tobacco. She reports that she does not currently use alcohol. She reports that she does not use drugs.    Allergies  Patient has no known allergies.    Medications  Current Outpatient Medications   Medication Instructions    cetirizine (ZYRTEC) 10 mg, Daily PRN     cholecalciferol (Vitamin D-3) 25 MCG (1000 UT) tablet 1 tablet, Daily    cyanocobalamin, vitamin B-12, (VITAMIN B-12 ORAL) 1,000 mcg, Daily    diazePAM (VALIUM) 5 mg, oral, Once    fluticasone (Flonase) 50 mcg/actuation nasal spray 1 spray, Daily PRN    gabapentin (NEURONTIN) 300 mg, oral, Every 8 hours    ibuprofen-diphenhydramine cit (Advil PM) 200-38 mg tablet per tablet 400 mg, Once    LORazepam (ATIVAN) 1 mg, 3 times daily PRN    multivitamin-children's (Cerovite, Jr) chewable tablet 2 tablets, Daily    tamoxifen (NOLVADEX) 20 mg, oral, Daily, Take with water or any other nonalcoholic drink with or without food at around the same time(s) every day.    Wegovy 2.4 mg, subcutaneous, Every 7 days    zinc acetate 50 mg (zinc) capsule 1 tablet, Daily       Last Recorded Vitals  Blood pressure (!) 145/92, pulse 110, temperature 36.2 °C (97.2 °F), temperature source Temporal, SpO2 99%.      Physical Exam  Chest:            Patient is alert and oriented x3 and in a relaxed and appropriate mood. Her gait is steady and hand grasps are equal. Sclera is clear. Bialteral breasts have been removed with well-healed mastectomy incision. Overlying tissue soft without palpable abnormalities, discrete nodules or masses. Left breast implant is soft and mobile. There is no cervical, supraclavicular or axillary lymphadenopathy.       Visit Diagnosis  1. Encounter for follow-up surveillance of breast cancer  Clinic Appointment Request Follow Up    Clinic Appointment Request Follow Up      2. Use of tamoxifen (Nolvadex)        3. BRCA negative            Assessment/Plan  Breast cancer surveillance, normal clinical exam, history left breast cancer, s/p bilateral mastectomy, on Tamoxifen, negative genetics, family history breast cancer    Plan:  Return February 2026 for clinical exam and office visit. Continue Tamoxifen.    Patient Discussion/Summary  Your clinical examination is normal. Please return in one year for clinical exam and  office visit or sooner if you have any problems or concerns.     If you receive medical information from My [facility] Personal Health Record (online chart) your results will be released into your chart. This means you may view or see results of your biopsy or procedure before I contact you directly. If this occurs, please call the office and we will discuss your results over the phone.    You can see your health information, review clinical summaries from office visits & test results online when you follow your health with MY  Chart, a personal health record. To sign up go to [email or web address]. If you need assistance with signing up or trouble getting into your account call [facility] Patient Line 24/7 at [phone].    My office phone number is [phone]  if you need to get in touch with me or have additional questions or concerns. Thank you for choosing [facility] and trusting me as your healthcare provider. I look forward to seeing you again at your next office visit. I am honored to be a provider on your health care team and I remain dedicated to helping you achieve your health goals.       Violeta cShroeder, APRN-CNP

## 2025-02-18 ENCOUNTER — APPOINTMENT (OUTPATIENT)
Dept: PLASTIC SURGERY | Facility: CLINIC | Age: 45
End: 2025-02-18
Payer: COMMERCIAL

## 2025-02-18 VITALS — DIASTOLIC BLOOD PRESSURE: 85 MMHG | SYSTOLIC BLOOD PRESSURE: 124 MMHG | HEART RATE: 87 BPM | TEMPERATURE: 97.3 F

## 2025-02-18 DIAGNOSIS — N65.1 BREAST ASYMMETRY FOLLOWING RECONSTRUCTIVE SURGERY: Primary | ICD-10-CM

## 2025-02-18 PROCEDURE — 99211 OFF/OP EST MAY X REQ PHY/QHP: CPT | Performed by: SURGERY

## 2025-02-18 ASSESSMENT — PAIN SCALES - GENERAL: PAINLEVEL_OUTOF10: 0-NO PAIN

## 2025-02-19 ENCOUNTER — OFFICE VISIT (OUTPATIENT)
Dept: SURGICAL ONCOLOGY | Facility: CLINIC | Age: 45
End: 2025-02-19
Payer: COMMERCIAL

## 2025-02-19 VITALS
DIASTOLIC BLOOD PRESSURE: 92 MMHG | OXYGEN SATURATION: 99 % | HEART RATE: 110 BPM | SYSTOLIC BLOOD PRESSURE: 145 MMHG | TEMPERATURE: 97.2 F

## 2025-02-19 DIAGNOSIS — Z85.3 ENCOUNTER FOR FOLLOW-UP SURVEILLANCE OF BREAST CANCER: Primary | ICD-10-CM

## 2025-02-19 DIAGNOSIS — Z08 ENCOUNTER FOR FOLLOW-UP SURVEILLANCE OF BREAST CANCER: Primary | ICD-10-CM

## 2025-02-19 DIAGNOSIS — Z79.810 USE OF TAMOXIFEN (NOLVADEX): ICD-10-CM

## 2025-02-19 DIAGNOSIS — Z13.71 BRCA NEGATIVE: ICD-10-CM

## 2025-02-19 PROCEDURE — 99213 OFFICE O/P EST LOW 20 MIN: CPT | Performed by: NURSE PRACTITIONER

## 2025-02-19 ASSESSMENT — PAIN SCALES - GENERAL: PAINLEVEL_OUTOF10: 0-NO PAIN

## 2025-04-14 RX ORDER — DIAZEPAM 5 MG/1
5 TABLET ORAL ONCE
Qty: 1 TABLET | Refills: 0 | Status: CANCELLED | OUTPATIENT
Start: 2025-04-14 | End: 2025-04-14

## 2025-04-14 NOTE — PROGRESS NOTES
PLASTIC SURGERY PRE-OP CLINIC NOTE  Date:4/15/25  Subjective :  Patient ID: Iris Barth  is a 44 y.o. female is here for pre-op appointment     Planned Date of Procedure: 5/21/25  Planned Surgical Procedure: Right Tissue Expander Placement     HPI:   Iris Barth is a 44 y.o. female is here for pre-operative appointment for the above procedure(s).     Currently, the patient states there were no changes in their medications or medical history.     Patient's vitals are Visit Vitals  /83 (BP Location: Right arm, Patient Position: Sitting, BP Cuff Size: Large adult)   Pulse 93   Temp 36.8 °C (98.3 °F) (Temporal)    today.     Patient is not currently on an ACE, ARB, or Diuretic.     Patient has Good Rx Card.     Patient is not on chronic NSAIDs.    MEDICATIONS    Current Outpatient Medications:     cetirizine (ZyrTEC) 10 mg tablet, Take 1 tablet (10 mg) by mouth once daily as needed for allergies., Disp: , Rfl:     cholecalciferol (Vitamin D-3) 25 MCG (1000 UT) tablet, Take 1 tablet (25 mcg) by mouth once daily., Disp: , Rfl:     cyanocobalamin, vitamin B-12, (VITAMIN B-12 ORAL), Take 1,000 mcg by mouth once daily., Disp: , Rfl:     fluticasone (Flonase) 50 mcg/actuation nasal spray, Administer 1 spray into each nostril once daily as needed for rhinitis or allergies. Shake gently. Before first use, prime pump. After use, clean tip and replace cap., Disp: , Rfl:     ibuprofen-diphenhydramine cit (Advil PM) 200-38 mg tablet per tablet, Take 400 mg by mouth 1 time., Disp: , Rfl:     LORazepam (Ativan) 0.5 mg tablet, Take 2 tablets (1 mg) by mouth 3 times a day as needed (anxiety)., Disp: , Rfl:     multivitamin-children's (Cerovite, Jr) chewable tablet, Chew 2 tablets once daily. Flintstones chewable, Disp: , Rfl:     semaglutide, weight loss, (Wegovy) 2.4 mg/0.75 mL pen injector, Inject 2.4 mg under the skin every 7 days., Disp: 9 mL, Rfl: 3    tamoxifen (Nolvadex) 20 mg tablet, Take 1 tablet (20 mg  total) by mouth once daily.  Take with water or any other nonalcoholic drink with or without food at around the same time(s) every day., Disp: 90 tablet, Rfl: 3    zinc acetate 50 mg (zinc) capsule, Take 1 tablet by mouth once daily., Disp: , Rfl:     diazePAM (Valium) 5 mg tablet, Take 1 tablet (5 mg) by mouth 1 time for 1 dose., Disp: 1 tablet, Rfl: 0    gabapentin (Neurontin) 300 mg capsule, Take 1 capsule (300 mg) by mouth every 8 hours for 14 days. (Patient not taking: Reported on 12/16/2024), Disp: 42 capsule, Rfl: 0     REVIEW OF SYSTEMS:    Constitutional: negative for fevers, chills, unintentional weight loss  HEENT: negative for changes in vision, headaches, changes in hearing, congestion, sore throat  Cardiovascular: negative for chest pain, palpitations  Respiratory: negative for cough, shortness of breath  Gastrointestinal: negative for nausea, vomiting, diarrhea  Genitourinary: negative for dysuria, hematuria  Musculoskeletal: negative for joint swelling or pain  Skin: negative for rashes or lesions  Psych: negative for depression, anxiety  Endocrine: negative for polyuria, polydipsia, cold/heat intolerance  Hem/Lymph: negative for bleeding disorder    PHYSICAL EXAM  General: alert and oriented, no apparent distress  Psych: normal mood and affect, judgement intact.   Eyes: No conjunctival erythema, extraocular movements intact, no scleral icterus, pupils equal and reactive to light  HENT: normocephalic, atraumatic, no rhinorrhea, no trauma to external meatus, no prior surgical scars  CV: hemodynamically stable  Resp: unlabored, no increased work of breathing, equal bilateral chest rise, no cough or stridor  Abdomen: soft, non-tender, non-distended. No surgical scars present. No rashes noted. No rectus diastasis present   Neuro: Unremarkable without focal findings, AAOx3, CN 2-12 grossly intact  Extremities/Musculoskeletal: Upper and lower extremities are atraumatic in appearance without tenderness or  deformity. No swelling or erythema. Full range of motion is noted to all joints. Steady gait noted.    Skin: Intact, soft and warm; no rashes, lesions, or bruising. No large masses or keloids noted on exam.     Focused exam of the breasts:   - Right Breast: s/p mastectomy, small fat grafting incisions intact   - Left Breast: s/p reconstruction. Incision C/D/I.  LABORATORY  Nutrition  Lab Results   Component Value Date    ALBUMIN 4.7 02/06/2024     ASSESSMENT/PLAN  Iris Barth is a 44 y.o. female s/p bilateral Left breast has been reconstructed. Right breast has hx of TE removal.      The patient will be undergoing Right TE possible impalnt placement and left revision to raise implanbt on 5/21/25 at [facility]. Recurrent ptosis of left implant, likely due to original large breast size +/- skin striae.  If recurs, can consider adding mesh.      Informed consent discussed with the patient, including: condition, proposed care, treatments and services, alternative forms of treatment, and risks of no treatment.  Details discussed around the procedures to be used, and the risks and hazards involved, potential benefits, and side effects of the patient's proposed care, treatment, and services; the likelihood of the patient achieving his or her goals; and any potential problems that might occur during recuperation. Reasonable alternative also discussed with the patient's proposed care, treatment, and services. The discussion encompasses risks, benefits, and side effects related to the alternative and risks related to not receiving the proposed care, treatment, and services. Written informed consent was obtained.    The patient was counseled to avoid anticoagulation medications and herbals including - but not limited to - ASA, NSAIDs, Plavix, fish oils, and green tea    Patient was counseled to avoid nicotine and areas with high amounts of secondhand smoke.     Patient was counseled to increase protein intake after surgery  to aid with wound healing with goal of 120g/day.     Patient was counseled on need for compression garments and/or support bras, given information about where to acquire these garments, and instructions to bring with on the day of surgery.     We discussed postoperative follow-up visits, recuperation and return to work.    Advised patient to contact office with any questions or concerns    All findings and diagnosis was discussed with patient at the time of this visit. Patient states they understand and are in agreement with the treatment plan at the time of this visit.     Medications eRx'd:   - Valium 5mg PO x 1 to be taken morning of surgery (ie while driving over)   -Celebrex 200 mg BID with meals- Good Rx card provided to the patient.  -Gabapentin 600 mg Q8H (  -Tylenol 1000 mg Q8H  -BactrimDS BID x 7 days    -Hibiclens - instructed the patient to wash breast and/or abdomen and margins the night before surgery or the morning of surgery; avoid washing face/eyes (2 packets if breast only, and 5 packets if breast and abdomen)    RTC 6/3/25 after surgery     Scribe Attestation   By signing my name below, Brayden BOOTH, Scribe attestation that this documentation has been prepared under the direction and in the presence of Ayah Edgar MD.    Attending Attestation:  Ayah BOOTH MD, personal performed the history, exam, and decision making on this patient.  A total of 20 mins were spent in patient counseling, review of medical records, and coordination of care.

## 2025-04-15 ENCOUNTER — TELEPHONE (OUTPATIENT)
Dept: ADMISSION | Facility: HOSPITAL | Age: 45
End: 2025-04-15

## 2025-04-15 ENCOUNTER — APPOINTMENT (OUTPATIENT)
Dept: PLASTIC SURGERY | Facility: CLINIC | Age: 45
End: 2025-04-15
Payer: COMMERCIAL

## 2025-04-15 VITALS
SYSTOLIC BLOOD PRESSURE: 113 MMHG | HEART RATE: 93 BPM | TEMPERATURE: 98.3 F | OXYGEN SATURATION: 100 % | DIASTOLIC BLOOD PRESSURE: 83 MMHG

## 2025-04-15 DIAGNOSIS — Z17.1 MALIGNANT NEOPLASM OF UPPER-OUTER QUADRANT OF LEFT BREAST IN FEMALE, ESTROGEN RECEPTOR NEGATIVE: ICD-10-CM

## 2025-04-15 DIAGNOSIS — N65.1 BREAST ASYMMETRY FOLLOWING RECONSTRUCTIVE SURGERY: Primary | ICD-10-CM

## 2025-04-15 DIAGNOSIS — G89.18 POST-OP PAIN: ICD-10-CM

## 2025-04-15 DIAGNOSIS — C50.412 MALIGNANT NEOPLASM OF UPPER-OUTER QUADRANT OF LEFT BREAST IN FEMALE, ESTROGEN RECEPTOR NEGATIVE: ICD-10-CM

## 2025-04-15 PROCEDURE — 99213 OFFICE O/P EST LOW 20 MIN: CPT | Performed by: SURGERY

## 2025-04-15 ASSESSMENT — PAIN SCALES - GENERAL: PAINLEVEL_OUTOF10: 0-NO PAIN

## 2025-04-15 NOTE — TELEPHONE ENCOUNTER
Pt left a voicemail requesting a refill of tamoxifen 20mg daily.  [facility] confirms there are 3 refills remaining on last prescription and they are working on filling it now.  Pt updated.

## 2025-04-16 RX ORDER — GABAPENTIN 300 MG/1
300 CAPSULE ORAL EVERY 8 HOURS
Qty: 42 CAPSULE | Refills: 0 | Status: SHIPPED | OUTPATIENT
Start: 2025-04-16 | End: 2025-04-30

## 2025-04-16 RX ORDER — SULFAMETHOXAZOLE AND TRIMETHOPRIM 800; 160 MG/1; MG/1
1 TABLET ORAL 2 TIMES DAILY
Qty: 28 TABLET | Refills: 0 | Status: SHIPPED | OUTPATIENT
Start: 2025-04-16 | End: 2025-04-30

## 2025-04-16 RX ORDER — CELECOXIB 200 MG/1
200 CAPSULE ORAL 2 TIMES DAILY
Qty: 28 CAPSULE | Refills: 0 | Status: SHIPPED | OUTPATIENT
Start: 2025-04-16 | End: 2025-04-30

## 2025-04-16 RX ORDER — ACETAMINOPHEN 500 MG
1000 TABLET ORAL EVERY 8 HOURS
Qty: 84 TABLET | Refills: 0 | Status: SHIPPED | OUTPATIENT
Start: 2025-04-16 | End: 2025-04-30

## 2025-04-16 RX ORDER — CHLORHEXIDINE GLUCONATE 40 MG/ML
SOLUTION TOPICAL ONCE
Qty: 118 ML | Refills: 0 | Status: SHIPPED | OUTPATIENT
Start: 2025-04-16 | End: 2025-04-16

## 2025-05-21 ENCOUNTER — ANESTHESIA EVENT (OUTPATIENT)
Dept: OPERATING ROOM | Facility: CLINIC | Age: 45
End: 2025-05-21
Payer: COMMERCIAL

## 2025-05-21 ENCOUNTER — HOSPITAL ENCOUNTER (OUTPATIENT)
Facility: CLINIC | Age: 45
Setting detail: OUTPATIENT SURGERY
Discharge: HOME | End: 2025-05-21
Attending: SURGERY | Admitting: SURGERY
Payer: COMMERCIAL

## 2025-05-21 ENCOUNTER — ANESTHESIA (OUTPATIENT)
Dept: OPERATING ROOM | Facility: CLINIC | Age: 45
End: 2025-05-21
Payer: COMMERCIAL

## 2025-05-21 VITALS
SYSTOLIC BLOOD PRESSURE: 111 MMHG | DIASTOLIC BLOOD PRESSURE: 61 MMHG | HEIGHT: 66 IN | WEIGHT: 190.7 LBS | RESPIRATION RATE: 16 BRPM | HEART RATE: 92 BPM | OXYGEN SATURATION: 100 % | TEMPERATURE: 96.8 F | BODY MASS INDEX: 30.65 KG/M2

## 2025-05-21 DIAGNOSIS — N65.1 BREAST ASYMMETRY FOLLOWING RECONSTRUCTIVE SURGERY: Primary | ICD-10-CM

## 2025-05-21 LAB — PREGNANCY TEST URINE, POC: NEGATIVE

## 2025-05-21 PROCEDURE — 2500000004 HC RX 250 GENERAL PHARMACY W/ HCPCS (ALT 636 FOR OP/ED): Mod: JZ | Performed by: SURGERY

## 2025-05-21 PROCEDURE — 3600000004 HC OR TIME - INITIAL BASE CHARGE - PROCEDURE LEVEL FOUR: Performed by: SURGERY

## 2025-05-21 PROCEDURE — 19342 INSJ/RPLCMT BRST IMPLT SEP D: CPT | Performed by: SURGERY

## 2025-05-21 PROCEDURE — 19380 REVJ RECONSTRUCTED BREAST: CPT | Performed by: SURGERY

## 2025-05-21 PROCEDURE — 3700000002 HC GENERAL ANESTHESIA TIME - EACH INCREMENTAL 1 MINUTE: Performed by: SURGERY

## 2025-05-21 PROCEDURE — 7100000002 HC RECOVERY ROOM TIME - EACH INCREMENTAL 1 MINUTE: Performed by: SURGERY

## 2025-05-21 PROCEDURE — A19380 PR REVISE BREAST RECONSTRUCTION: Performed by: NURSE ANESTHETIST, CERTIFIED REGISTERED

## 2025-05-21 PROCEDURE — 2500000005 HC RX 250 GENERAL PHARMACY W/O HCPCS: Performed by: ANESTHESIOLOGY

## 2025-05-21 PROCEDURE — 3600000009 HC OR TIME - EACH INCREMENTAL 1 MINUTE - PROCEDURE LEVEL FOUR: Performed by: SURGERY

## 2025-05-21 PROCEDURE — 99212 OFFICE O/P EST SF 10 MIN: CPT | Performed by: PHYSICIAN ASSISTANT

## 2025-05-21 PROCEDURE — C1789 PROSTHESIS, BREAST, IMP: HCPCS | Performed by: SURGERY

## 2025-05-21 PROCEDURE — 2500000001 HC RX 250 WO HCPCS SELF ADMINISTERED DRUGS (ALT 637 FOR MEDICARE OP): Performed by: SURGERY

## 2025-05-21 PROCEDURE — 19380 REVJ RECONSTRUCTED BREAST: CPT | Performed by: PHYSICIAN ASSISTANT

## 2025-05-21 PROCEDURE — 14302 TIS TRNFR ADDL 30 SQ CM: CPT | Performed by: SURGERY

## 2025-05-21 PROCEDURE — 3700000001 HC GENERAL ANESTHESIA TIME - INITIAL BASE CHARGE: Performed by: SURGERY

## 2025-05-21 PROCEDURE — 14301 TIS TRNFR ANY 30.1-60 SQ CM: CPT | Performed by: PHYSICIAN ASSISTANT

## 2025-05-21 PROCEDURE — 2500000002 HC RX 250 W HCPCS SELF ADMINISTERED DRUGS (ALT 637 FOR MEDICARE OP, ALT 636 FOR OP/ED): Performed by: NURSE ANESTHETIST, CERTIFIED REGISTERED

## 2025-05-21 PROCEDURE — 2500000004 HC RX 250 GENERAL PHARMACY W/ HCPCS (ALT 636 FOR OP/ED): Mod: JZ,TB | Performed by: NURSE ANESTHETIST, CERTIFIED REGISTERED

## 2025-05-21 PROCEDURE — 2500000005 HC RX 250 GENERAL PHARMACY W/O HCPCS: Mod: JZ | Performed by: SURGERY

## 2025-05-21 PROCEDURE — 7100000009 HC PHASE TWO TIME - INITIAL BASE CHARGE: Performed by: SURGERY

## 2025-05-21 PROCEDURE — 2720000007 HC OR 272 NO HCPCS: Performed by: SURGERY

## 2025-05-21 PROCEDURE — A19380 PR REVISE BREAST RECONSTRUCTION: Performed by: ANESTHESIOLOGY

## 2025-05-21 PROCEDURE — 19342 INSJ/RPLCMT BRST IMPLT SEP D: CPT | Performed by: PHYSICIAN ASSISTANT

## 2025-05-21 PROCEDURE — 7100000010 HC PHASE TWO TIME - EACH INCREMENTAL 1 MINUTE: Performed by: SURGERY

## 2025-05-21 PROCEDURE — 2780000003 HC OR 278 NO HCPCS: Performed by: SURGERY

## 2025-05-21 PROCEDURE — 81025 URINE PREGNANCY TEST: CPT | Performed by: SURGERY

## 2025-05-21 PROCEDURE — 14301 TIS TRNFR ANY 30.1-60 SQ CM: CPT | Performed by: SURGERY

## 2025-05-21 PROCEDURE — 7100000001 HC RECOVERY ROOM TIME - INITIAL BASE CHARGE: Performed by: SURGERY

## 2025-05-21 DEVICE — IMPLANTABLE DEVICE: Type: IMPLANTABLE DEVICE | Site: BREAST | Status: FUNCTIONAL

## 2025-05-21 RX ORDER — LIDOCAINE HYDROCHLORIDE 10 MG/ML
0.1 INJECTION, SOLUTION EPIDURAL; INFILTRATION; INTRACAUDAL; PERINEURAL ONCE
Status: DISCONTINUED | OUTPATIENT
Start: 2025-05-21 | End: 2025-05-21 | Stop reason: HOSPADM

## 2025-05-21 RX ORDER — HYDROMORPHONE HYDROCHLORIDE 1 MG/ML
0.5 INJECTION, SOLUTION INTRAMUSCULAR; INTRAVENOUS; SUBCUTANEOUS EVERY 5 MIN PRN
Status: DISCONTINUED | OUTPATIENT
Start: 2025-05-21 | End: 2025-05-21 | Stop reason: HOSPADM

## 2025-05-21 RX ORDER — HYDROMORPHONE HYDROCHLORIDE 1 MG/ML
0.2 INJECTION, SOLUTION INTRAMUSCULAR; INTRAVENOUS; SUBCUTANEOUS EVERY 5 MIN PRN
Status: DISCONTINUED | OUTPATIENT
Start: 2025-05-21 | End: 2025-05-21 | Stop reason: HOSPADM

## 2025-05-21 RX ORDER — LIDOCAINE HCL/PF 100 MG/5ML
SYRINGE (ML) INTRAVENOUS AS NEEDED
Status: DISCONTINUED | OUTPATIENT
Start: 2025-05-21 | End: 2025-05-21

## 2025-05-21 RX ORDER — ONDANSETRON HYDROCHLORIDE 2 MG/ML
INJECTION, SOLUTION INTRAVENOUS AS NEEDED
Status: DISCONTINUED | OUTPATIENT
Start: 2025-05-21 | End: 2025-05-21

## 2025-05-21 RX ORDER — SCOPOLAMINE 1 MG/3D
PATCH, EXTENDED RELEASE TRANSDERMAL AS NEEDED
Status: DISCONTINUED | OUTPATIENT
Start: 2025-05-21 | End: 2025-05-21

## 2025-05-21 RX ORDER — HYDROMORPHONE HYDROCHLORIDE 1 MG/ML
INJECTION, SOLUTION INTRAMUSCULAR; INTRAVENOUS; SUBCUTANEOUS AS NEEDED
Status: DISCONTINUED | OUTPATIENT
Start: 2025-05-21 | End: 2025-05-21

## 2025-05-21 RX ORDER — SODIUM CHLORIDE, SODIUM LACTATE, POTASSIUM CHLORIDE, CALCIUM CHLORIDE 600; 310; 30; 20 MG/100ML; MG/100ML; MG/100ML; MG/100ML
INJECTION, SOLUTION INTRAVENOUS CONTINUOUS PRN
Status: DISCONTINUED | OUTPATIENT
Start: 2025-05-21 | End: 2025-05-21

## 2025-05-21 RX ORDER — ACETAMINOPHEN 325 MG/1
975 TABLET ORAL ONCE
Status: COMPLETED | OUTPATIENT
Start: 2025-05-21 | End: 2025-05-21

## 2025-05-21 RX ORDER — ONDANSETRON HYDROCHLORIDE 2 MG/ML
4 INJECTION, SOLUTION INTRAVENOUS ONCE AS NEEDED
Status: DISCONTINUED | OUTPATIENT
Start: 2025-05-21 | End: 2025-05-21 | Stop reason: HOSPADM

## 2025-05-21 RX ORDER — ACETAMINOPHEN 10 MG/ML
INJECTION, SOLUTION INTRAVENOUS AS NEEDED
Status: DISCONTINUED | OUTPATIENT
Start: 2025-05-21 | End: 2025-05-21

## 2025-05-21 RX ORDER — CEFAZOLIN 1 G/1
INJECTION, POWDER, FOR SOLUTION INTRAVENOUS AS NEEDED
Status: DISCONTINUED | OUTPATIENT
Start: 2025-05-21 | End: 2025-05-21

## 2025-05-21 RX ORDER — SILVER SULFADIAZINE 10 G/1000G
CREAM TOPICAL DAILY
Status: DISCONTINUED | OUTPATIENT
Start: 2025-05-21 | End: 2025-05-21 | Stop reason: HOSPADM

## 2025-05-21 RX ORDER — METHOCARBAMOL 100 MG/ML
INJECTION, SOLUTION INTRAMUSCULAR; INTRAVENOUS AS NEEDED
Status: DISCONTINUED | OUTPATIENT
Start: 2025-05-21 | End: 2025-05-21

## 2025-05-21 RX ORDER — ALBUTEROL SULFATE 0.83 MG/ML
2.5 SOLUTION RESPIRATORY (INHALATION) ONCE AS NEEDED
Status: DISCONTINUED | OUTPATIENT
Start: 2025-05-21 | End: 2025-05-21 | Stop reason: HOSPADM

## 2025-05-21 RX ORDER — MIDAZOLAM HYDROCHLORIDE 1 MG/ML
INJECTION, SOLUTION INTRAMUSCULAR; INTRAVENOUS AS NEEDED
Status: DISCONTINUED | OUTPATIENT
Start: 2025-05-21 | End: 2025-05-21

## 2025-05-21 RX ORDER — FENTANYL CITRATE 50 UG/ML
INJECTION, SOLUTION INTRAMUSCULAR; INTRAVENOUS AS NEEDED
Status: DISCONTINUED | OUTPATIENT
Start: 2025-05-21 | End: 2025-05-21

## 2025-05-21 RX ORDER — APREPITANT 40 MG/1
CAPSULE ORAL AS NEEDED
Status: DISCONTINUED | OUTPATIENT
Start: 2025-05-21 | End: 2025-05-21

## 2025-05-21 RX ORDER — OXYCODONE HYDROCHLORIDE 5 MG/1
5 TABLET ORAL ONCE AS NEEDED
Status: DISCONTINUED | OUTPATIENT
Start: 2025-05-21 | End: 2025-05-21 | Stop reason: HOSPADM

## 2025-05-21 RX ORDER — PROPOFOL 10 MG/ML
INJECTION, EMULSION INTRAVENOUS CONTINUOUS PRN
Status: DISCONTINUED | OUTPATIENT
Start: 2025-05-21 | End: 2025-05-21

## 2025-05-21 RX ORDER — PHENYLEPHRINE HCL IN 0.9% NACL 0.4MG/10ML
SYRINGE (ML) INTRAVENOUS AS NEEDED
Status: DISCONTINUED | OUTPATIENT
Start: 2025-05-21 | End: 2025-05-21

## 2025-05-21 RX ORDER — GABAPENTIN 300 MG/1
600 CAPSULE ORAL ONCE
Status: DISCONTINUED | OUTPATIENT
Start: 2025-05-21 | End: 2025-05-21 | Stop reason: HOSPADM

## 2025-05-21 RX ORDER — SODIUM CHLORIDE, SODIUM LACTATE, POTASSIUM CHLORIDE, CALCIUM CHLORIDE 600; 310; 30; 20 MG/100ML; MG/100ML; MG/100ML; MG/100ML
100 INJECTION, SOLUTION INTRAVENOUS CONTINUOUS
Status: SHIPPED | OUTPATIENT
Start: 2025-05-21 | End: 2025-05-21

## 2025-05-21 RX ORDER — METOCLOPRAMIDE HYDROCHLORIDE 5 MG/ML
10 INJECTION INTRAMUSCULAR; INTRAVENOUS ONCE AS NEEDED
Status: DISCONTINUED | OUTPATIENT
Start: 2025-05-21 | End: 2025-05-21 | Stop reason: HOSPADM

## 2025-05-21 RX ADMIN — DEXAMETHASONE SODIUM PHOSPHATE 6 MG: 4 INJECTION, SOLUTION INTRA-ARTICULAR; INTRALESIONAL; INTRAMUSCULAR; INTRAVENOUS; SOFT TISSUE at 07:50

## 2025-05-21 RX ADMIN — Medication 40 MCG: at 11:23

## 2025-05-21 RX ADMIN — HYDROMORPHONE HYDROCHLORIDE 0.2 MG: 1 INJECTION, SOLUTION INTRAMUSCULAR; INTRAVENOUS; SUBCUTANEOUS at 08:41

## 2025-05-21 RX ADMIN — PROPOFOL 50 MCG/KG/MIN: 10 INJECTION, EMULSION INTRAVENOUS at 07:51

## 2025-05-21 RX ADMIN — PROPOFOL 20 MG: 10 INJECTION, EMULSION INTRAVENOUS at 08:24

## 2025-05-21 RX ADMIN — APREPITANT 40 MG: 40 CAPSULE ORAL at 07:27

## 2025-05-21 RX ADMIN — CEFAZOLIN 2 G: 330 INJECTION, POWDER, FOR SOLUTION INTRAMUSCULAR; INTRAVENOUS at 11:50

## 2025-05-21 RX ADMIN — Medication 80 MCG: at 09:38

## 2025-05-21 RX ADMIN — LIDOCAINE HYDROCHLORIDE 100 MG: 20 INJECTION INTRAVENOUS at 07:43

## 2025-05-21 RX ADMIN — BACITRACIN ZINC AND POLYMYXIN B SULFATE: 500; 10000 OINTMENT TOPICAL at 14:43

## 2025-05-21 RX ADMIN — CEFAZOLIN 2 G: 330 INJECTION, POWDER, FOR SOLUTION INTRAMUSCULAR; INTRAVENOUS at 07:50

## 2025-05-21 RX ADMIN — Medication 80 MCG: at 10:33

## 2025-05-21 RX ADMIN — ACETAMINOPHEN 1000 MG: 10 INJECTION, SOLUTION INTRAVENOUS at 07:55

## 2025-05-21 RX ADMIN — PROPOFOL 200 MG: 10 INJECTION, EMULSION INTRAVENOUS at 07:43

## 2025-05-21 RX ADMIN — Medication 80 MCG: at 09:41

## 2025-05-21 RX ADMIN — METHOCARBAMOL 1000 MG: 100 INJECTION INTRAMUSCULAR; INTRAVENOUS at 08:15

## 2025-05-21 RX ADMIN — MIDAZOLAM 2 MG: 1 INJECTION INTRAMUSCULAR; INTRAVENOUS at 07:35

## 2025-05-21 RX ADMIN — SCOPOLAMINE 1 PATCH: 1.5 PATCH, EXTENDED RELEASE TRANSDERMAL at 07:27

## 2025-05-21 RX ADMIN — FENTANYL CITRATE 100 MCG: 50 INJECTION, SOLUTION INTRAMUSCULAR; INTRAVENOUS at 07:43

## 2025-05-21 RX ADMIN — HYDROMORPHONE HYDROCHLORIDE 0.2 MG: 1 INJECTION, SOLUTION INTRAMUSCULAR; INTRAVENOUS; SUBCUTANEOUS at 10:11

## 2025-05-21 RX ADMIN — HYDROMORPHONE HYDROCHLORIDE 0.2 MG: 1 INJECTION, SOLUTION INTRAMUSCULAR; INTRAVENOUS; SUBCUTANEOUS at 09:15

## 2025-05-21 RX ADMIN — SODIUM CHLORIDE, POTASSIUM CHLORIDE, SODIUM LACTATE AND CALCIUM CHLORIDE: 600; 310; 30; 20 INJECTION, SOLUTION INTRAVENOUS at 07:20

## 2025-05-21 RX ADMIN — ONDANSETRON 4 MG: 2 INJECTION INTRAMUSCULAR; INTRAVENOUS at 12:39

## 2025-05-21 RX ADMIN — HYDROMORPHONE HYDROCHLORIDE 0.4 MG: 1 INJECTION, SOLUTION INTRAMUSCULAR; INTRAVENOUS; SUBCUTANEOUS at 08:24

## 2025-05-21 RX ADMIN — ACETAMINOPHEN 975 MG: 325 TABLET, FILM COATED ORAL at 14:08

## 2025-05-21 SDOH — HEALTH STABILITY: MENTAL HEALTH: CURRENT SMOKER: 0

## 2025-05-21 ASSESSMENT — PAIN SCALES - GENERAL
PAINLEVEL_OUTOF10: 3
PAINLEVEL_OUTOF10: 0 - NO PAIN
PAINLEVEL_OUTOF10: 3
PAINLEVEL_OUTOF10: 0 - NO PAIN
PAINLEVEL_OUTOF10: 3
PAINLEVEL_OUTOF10: 0 - NO PAIN
PAINLEVEL_OUTOF10: 3

## 2025-05-21 ASSESSMENT — PAIN - FUNCTIONAL ASSESSMENT
PAIN_FUNCTIONAL_ASSESSMENT: 0-10

## 2025-05-21 ASSESSMENT — COLUMBIA-SUICIDE SEVERITY RATING SCALE - C-SSRS
6. HAVE YOU EVER DONE ANYTHING, STARTED TO DO ANYTHING, OR PREPARED TO DO ANYTHING TO END YOUR LIFE?: NO
1. IN THE PAST MONTH, HAVE YOU WISHED YOU WERE DEAD OR WISHED YOU COULD GO TO SLEEP AND NOT WAKE UP?: NO
2. HAVE YOU ACTUALLY HAD ANY THOUGHTS OF KILLING YOURSELF?: NO

## 2025-05-21 NOTE — BRIEF OP NOTE
Date: 2025  OR Location: [facility] OR    Name: Iris Barth, : 1980, Age: 44 y.o., MRN: [id number], Sex: female    Diagnosis  Pre-op Diagnosis      * Breast asymmetry following reconstructive surgery [N65.1] Post-op Diagnosis     * Breast asymmetry following reconstructive surgery [N65.1]     Procedures  INSERTION, IMPLANT  85178 - DE INSJ/RPLCMT BREAST IMPLANT SEP DAY MASTECTOMY    REVISION  RECONSTRUCTED BREAST  49775 - DE REVISION OF RECONSTRUCTED BREAST    Adjacent tissue rearrangement  89542 - DE ADJNT TIS TRNSFR/REARGMT ANY AREA 30.1-60 SQ CM    DE INSJ/RPLCMT BREAST IMPLANT SEP DAY MASTECTOMY []  DE REVISION OF RECONSTRUCTED BREAST []  Surgeons      * Ayah Edgar - Primary    Resident/Fellow/Other Assistant:  Surgeons and Role:  * No surgeons found with a matching role *    Staff:   Surgical Assistant:   Scrub Person: Jaimie  Circulator: Juanita Mayer Circulator: Lyubov Mayer Scrub: Ruchi    Anesthesia Staff: Anesthesiologist: German Vasquez MD  CRNA: CARMELITA Lerma-CRNA    Procedure Summary  Anesthesia: General  ASA: III  Estimated Blood Loss: 25mL  Intra-op Medications:   Administrations occurring from 0715 to 1045 on 25:   Medication Name Total Dose   bupivacaine liposome (Exparel) 1.3 % (13.3 mg/mL) 20 mL, bupivacaine PF (Marcaine) 0.5 % (5 mg/mL) 20 mL, sodium chloride (PF) 0.9% 20 mL syringe 120 mL   acetaminophen (Ofirmev) injection 1,000 mg   aprepitant (Emend) capsule 40 mg   ceFAZolin (Ancef) vial 1 g 2 g   dexAMETHasone (Decadron) 4 mg/mL IV Syringe 2 mL 6 mg   fentaNYL (Sublimaze) injection 50 mcg/mL 100 mcg   HYDROmorphone (Dilaudid) injection 1 mg/mL 1 mg   lactated Ringer's infusion Cannot be calculated   lidocaine (cardiac) injection 2% prefilled syringe 100 mg   methocarbamol (Robaxin) injection 1,000 mg   midazolam (Versed) injection 1 mg/mL 2 mg   phenylephrine 40 mcg/mL syringe 10 mL 240 mcg   propofol (Diprivan) injection 10 mg/mL  956.98 mg   scopolamine (Transderm-Scop) 1 mg/3 days patch 1 patch              Anesthesia Record               Intraprocedure I/O Totals          Output    Est. Blood Loss 25 mL    Total Output 25 mL          Specimen: No specimens collected               Findings: L breast implant recon/lift and R breast implant insertion    Complications:  None; patient tolerated the procedure well.     Disposition: PACU - hemodynamically stable.  Condition: stable  Specimens Collected: No specimens collected  Attending Attestation: A qualified resident physician was not available.    VENTURA Colbert  Phone Number: [phone]

## 2025-05-21 NOTE — H&P
Division of Plastic and Reconstructive Surgery   History and Physical    History Of Present Illness  Iris Barth is a 44 y.o. female with medical history of bilateral skin sparing mastectomy, left axillary SLNB on 1/8/2024 for multicentric left breast invasive ductal carcinoma (IDC), ER+80-95%, CT+ 80-95%, HER2 negative. 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 She had post op complications by tissue necrosis and infection. She completed genetic testing which was negative on 2/2/24. 2/7/2024 She had removal of right tissue expander with Dr. Bright. She started Tamoxifen 2/13/2024 with Dr. Benavidez and tolerating it well. On 7/22/24 Dr. Edgar completed left breast tissue expander exchange for implant with bilateral breast fat grafting and 12/16/24 for left breast reconstruction revision and bilateral fat grafting. Denies recent fever/chills, cough, cold symptoms, chest pain, palpitations, lightheadedness/dizziness, shortness of breath, abdominal pain, N/V/D, urinary symptoms or leg swelling    Past Medical History  Medical History[1]    Surgical History  Surgical History[2]     Social History  Social History[3]     Family History  Family History[4]     Allergies  Patient has no known allergies.    Review of Systems  Constitutional: negative for fevers, chills, unintentional weight loss  HEENT: negative for changes in vision, headaches, changes in hearing, congestion, sore throat  Cardiovascular: negative for chest pain, palpitations  Respiratory: negative for cough, shortness of breath  Gastrointestinal: negative for nausea, vomiting, diarrhea  Genitourinary: negative for dysuria, hematuria  Musculoskeletal: negative for joint swelling or pain  Skin: negative for rashes or lesions  Psych: negative for depression, anxiety  Endocrine: negative for  polyuria, polydipsia, cold/heat intolerance  Hem/Lymph: negative for bleeding disorder       Physical Exam  General: alert and oriented, no apparent distress  Psych: normal mood and affect, judgement intact.   Eyes: No conjunctival erythema, extraocular movements intact, no scleral icterus, pupils equal and reactive to light  HENT: normocephalic, atraumatic, no rhinorrhea, no trauma to external meatus, no prior surgical scars  CV: hemodynamically stable  Resp: unlabored, no increased work of breathing, equal bilateral chest rise, no cough or stridor  Abdomen: soft, non-tender, non-distended. No surgical scars present. No rashes noted. No rectus diastasis present   Neuro: Unremarkable without focal findings, AAOx3, CN 2-12 grossly intact  Extremities/Musculoskeletal: Upper and lower extremities are atraumatic in appearance without tenderness or deformity. No swelling or erythema. Full range of motion is noted to all joints. Steady gait noted.    Skin: Intact, soft and warm; no rashes, lesions, or bruising. No large masses or keloids noted on exam.       Last Recorded Vitals  Visit Vitals  OB Status Having periods   Smoking Status Never      Vitals:    05/21/25 0709   BP: 139/83   Pulse: 96   Resp: 16   Temp: 36 °C (96.8 °F)   SpO2: 99%         Relevant Results  No results found for this or any previous visit (from the past 24 hours).   EKG:  No results found for this or any previous visit (from the past 4464 hours).  Echo:  No results found for this or any previous visit.      Home Medications  Prior to Admission medications    Medication Sig Start Date End Date Taking? Authorizing Provider   tamoxifen (Nolvadex) 20 mg tablet Take 1 tablet (20 mg total) by mouth once daily.  Take with water or any other nonalcoholic drink with or without food at around the same time(s) every day. 1/27/25  Yes James Sebastian MD   cetirizine (ZyrTEC) 10 mg tablet Take 1 tablet (10 mg) by mouth once daily as needed for allergies.     Historical Provider, MD   cholecalciferol (Vitamin D-3) 25 MCG (1000 UT) tablet Take 1 tablet (25 mcg) by mouth once daily.    Historical Provider, MD   cyanocobalamin, vitamin B-12, (VITAMIN B-12 ORAL) Take 1,000 mcg by mouth once daily.    Historical Provider, MD   diazePAM (Valium) 5 mg tablet Take 1 tablet (5 mg) by mouth 1 time for 1 dose. 12/13/24 12/16/24  Ayah Edgar MD   fluticasone (Flonase) 50 mcg/actuation nasal spray Administer 1 spray into each nostril once daily as needed for rhinitis or allergies. Shake gently. Before first use, prime pump. After use, clean tip and replace cap.    Historical Provider, MD   gabapentin (Neurontin) 300 mg capsule Take 1 capsule (300 mg) by mouth every 8 hours for 14 days.  Patient not taking: Reported on 12/16/2024 11/26/24 12/10/24  Ayah Edgar MD   gabapentin (Neurontin) 300 mg capsule Take 1 capsule (300 mg) by mouth every 8 hours for 14 days. 4/16/25 4/30/25  Apple Salguero PA-C   ibuprofen-diphenhydramine cit (Advil PM) 200-38 mg tablet per tablet Take 400 mg by mouth 1 time.    Historical Provider, MD   LORazepam (Ativan) 0.5 mg tablet Take 2 tablets (1 mg) by mouth 3 times a day as needed (anxiety).    Historical Provider, MD   multivitamin-children's (Cerovite, Jr) chewable tablet Chew 2 tablets once daily. Flintstones chewable    Historical Provider, MD   semaglutide, weight loss, (Wegovy) 2.4 mg/0.75 mL pen injector Inject 2.4 mg under the skin every 7 days. 10/15/24   Roxann Basurto,    zinc acetate 50 mg (zinc) capsule Take 1 tablet by mouth once daily.    Historical Provider, MD       Medications  Scheduled medications  Scheduled Medications[5]  Continuous medications  Continuous Medications[6]  PRN medications  PRN Medications[7]        Assessment/Plan   Assessment & Plan  Breast asymmetry following reconstructive surgery      Iris Barth is a 44 y.o. female s/p bilateral Left breast has been reconstructed. Right breast has hx of TE  removal.       The patient will be undergoing Right TE possible implant placement and left [place] [facility] Recurrent ptosis of left implant, likely due to original large breast size +/- skin striae.  If recurs, can consider adding mesh.       Informed consent discussed with the patient, including: condition, proposed care, treatments and services, alternative forms of treatment, and risks of no treatment.  Details discussed around the procedures to be used, and the risks and hazards involved, potential benefits, and side effects of the patient's proposed care, treatment, and services; the likelihood of the patient achieving his or her goals; and any potential problems that might occur during recuperation. Reasonable alternative also discussed with the patient's proposed care, treatment, and services. The discussion encompasses risks, benefits, and side effects related to the alternative and risks related to not receiving the proposed care, treatment, and services. Written informed consent was obtained.     The patient was counseled to avoid anticoagulation medications and herbals including - but not limited to - ASA, NSAIDs, Plavix, fish oils, and green tea     Patient was counseled to avoid nicotine and areas with high amounts of secondhand smoke.      Patient was counseled to increase protein intake after surgery to aid with wound healing with goal of 120g/day.      Patient was counseled on need for compression garments and/or support bras, given information about where to acquire these garments, and instructions to bring with on the day of surgery.      We discussed postoperative follow-up visits, recuperation and return to work.     Advised patient to contact office with any questions or concerns     All findings and diagnosis was discussed with patient at the time of this visit. Patient states they understand and are in agreement with the treatment plan at the time of this  visit.      Medications eRx'd:              - Valium 5mg PO x 1 to be taken morning of surgery (ie while driving over)   -Celebrex 200 mg BID with meals- Good Rx card provided to the patient.  -Gabapentin 600 mg Q8H   -Tylenol 1000 mg Q8H  -BactrimDS BID x 7 days    -Hibiclens - instructed the patient to wash breast and/or abdomen and margins the night before surgery or the morning of surgery; avoid washing face/eyes (2 packets if breast only, and 5 packets if breast and abdomen)     RTC 6/3/25 after surgery     Carmelina Swift PA-C  Plastic and Reconstructive Surgery                 [1]   Past Medical History:  Diagnosis Date    Abnormal finding on breast imaging 11/07/2023    Acid reflux     pepcis prn    Anxiety     Lorazepam as needed    Breast CA     left    COVID-19 12/09/2023    was given Paxlovid, fully recovered    DVT (deep vein thrombosis) in pregnancy ([facility])     proved after left knee scope,  left calf , SQ heparin briefly with no further issues, requests post op Heparin for this procedure    Heart murmur     asymptomatic   [2]   Past Surgical History:  Procedure Laterality Date    BELT ABDOMINOPLASTY      BI STEREOTACTIC GUIDED BREAST RIGHT LOCALIZATION AND BIOPSY Right 11/10/2023    BI STEREOSTATIC GUIDED BREAST RIGHT LOCALIZATION AND BIOPSY 11/10/2023 Francia Krishnamurthy MD [facility]    BI US GUIDED BREAST LOCALIZATION AND BIOPSY LEFT Left 11/10/2023    BI US GUIDED BREAST LOCALIZATION AND BIOPSY LEFT 11/10/2023 Francia Krishnamurthy MD [facility]    BREAST CYST INCISION AND DRAINAGE Left     KNEE ARTHROSCOPY W/ DEBRIDEMENT      x2    MASTECTOMY Bilateral 01/08/2024    multiple subsquent procedures for reconstruction   [3]   Social History  Tobacco Use    Smoking status: Never     Passive exposure: Never    Smokeless tobacco: Never   Vaping Use    Vaping status: Never Used   Substance Use Topics    Alcohol use: Not Currently     Comment: wine or cocktail 2x/month    Drug use: Never   [4]   Family History  Problem  Relation Name Age of Onset    No Known Problems Mother      Blood clot Father      Skin cancer Father      No Known Problems Sister      No Known Problems Sister      Breast cancer Maternal Grandmother      Breast cancer Paternal Grandmother      Cancer Other grandparent    [5] [6] [7]

## 2025-05-21 NOTE — ANESTHESIA POSTPROCEDURE EVALUATION
Patient: Iris Barth    Procedure Summary       Date: 05/21/25 [place] / [facility] [facility] / [facility]    Anesthesia Start: 0734 Anesthesia Stop: 1310    Procedures:       INSERTION, IMPLANT (Right: Breast)      REVISION  RECONSTRUCTED BREAST (Left)      Adjacent tissue rearrangement (Right) Diagnosis:       Breast asymmetry following reconstructive surgery      (Breast asymmetry following reconstructive surgery [N65.1])    Surgeons: Ayah Edgar MD Responsible Provider: German Vasquez MD    Anesthesia Type: general ASA Status: 3            Anesthesia Type: general    Vitals Value Taken Time   /67 05/21/25 13:15   Temp 36.1 °C (97 °F) 05/21/25 13:05   Pulse 106 05/21/25 13:15   Resp 16 05/21/25 13:15   SpO2 96 % 05/21/25 13:15       Anesthesia Post Evaluation    Patient location during evaluation: bedside  Patient participation: complete - patient cannot participate  Level of consciousness: awake  Pain management: satisfactory to patient  Multimodal analgesia pain management approach  Airway patency: patent  Cardiovascular status: acceptable  Respiratory status: acceptable  Hydration status: stable  Postoperative Nausea and Vomiting: none  Comments: Did well- will check her ability to void in PACU    Had erythema on both great toes- not sure of source had on compression stockings with hospital socks on both feet-?? Perhaps toes were leaning against RAEGAN hugger hose ( suspect that would have shown medial foot changes)..    Perhaps socks were too tight- she notes that she does have sensitive skin (evident with skin around her eyes where they were taped during the procedure)    Ordered bacitracin to each toes TID        No notable events documented.

## 2025-05-21 NOTE — ANESTHESIA PREPROCEDURE EVALUATION
Patient: Iris Barth    Procedure Information       Date/Time: 05/21/25 0715    Procedure: INSERTION, TISSUE EXPANDER, BREAST (Right: Breast)    Location: [facility] 04 / [facility]    Surgeons: Ayah Edgar MD            Relevant Problems   Anesthesia   (+) PONV (postoperative nausea and vomiting)      Neuro   (+) Anxiety      GI   (+) Gastroesophageal reflux disease      ID   (+) Infection of right breast   (+) Surgical site infection      GYN   (+) Malignant neoplasm of overlapping sites of left breast in female, estrogen receptor positive   (+) Malignant neoplasm of upper-outer quadrant of left breast in female, estrogen receptor negative       Clinical information reviewed:   Tobacco  Allergies  Meds   Med Hx  Surg Hx   Fam Hx  Soc Hx        NPO Detail:  NPO/Void Status  Date of Last Liquid: 05/21/25  Time of Last Liquid: 0000  Date of Last Solid: 05/20/25  Time of Last Solid: 0600 (Dr. Vasquez made aware)      Vitals:    05/21/25 0709   BP: 139/83   Pulse: 96   Resp: 16   Temp: 36 °C (96.8 °F)   SpO2: 99%          Physical Exam    Airway  Mallampati: I  TM distance: >3 FB  Neck ROM: full  Mouth opening: 3 or more finger widths     Cardiovascular   Rhythm: regular  Rate: normal     Dental - normal exam     Pulmonary Breath sounds clear to auscultation     Abdominal      Other findings: Multiple caps and crowns, all secure        Anesthesia Plan    History of general anesthesia?: yes  History of complications of general anesthesia?: no    ASA 3     general     The patient is not a current smoker.    intravenous induction   Postoperative pain plan includes opioids.  Trial extubation is planned.  Anesthetic plan and risks discussed with patient and spouse.    Plan discussed with CRNA.

## 2025-05-21 NOTE — DISCHARGE INSTRUCTIONS
Plastic Surgery Post Operative Instructions  You had surgery today at [facility] for R breast implant placement and left revision to raise implant with Dr. Edgar of plastic and reconstructive surgery. Included below are post-operative instructions and details regarding follow-up.     Thank you for allowing us to participate in your care and we wish you the best!    Best Regards,  [facility]  Department of Plastic and Reconstructive Surgery    Activity:  No pushing, pulling or lifting objects. Please do not apply ice or heat directly to the skin.    NO showering until follow-up visit. Please leave dressings and pink surgical bra in place.     Surgical Site/Wound Care:  Please notify office immediately if developing signs of infection which include increased redness, swelling, fever/chills, green/yellow drainage, or foul odor from wound. Plastic Surgery office line: [phone].      Drain care:  You are being discharged with 1 DIANNA drain to R breast. To empty the drain, open the cap, tip into cup and squeeze to empty. Squeeze drain flat then replace the cap.  Please empty the drain and record its output 3 times a day and bring these numbers to your follow up appointment.  The drain output should decrease and the color of the drainage should become lighter (red to pink to yellow).  This drain is sutured into place.  Keep the area around the drain clean and dry.  You may use mild soap and water to cleanse around the drain.  It is ok to shower; do not soak in a tub. Change the gauze around the drain as needed.  Call the office if you notice drastic changes in drain output, bloody drain output, or redness/drainage around insertion site.    Nutrition:  You may resume a regular diet following surgery. Ensure that you are drinking an adequate amount of fluids to maintain hydration. Recommend consuming at least 100g-120g of protein daily to optimize wound  healing.    Medication Instructions:  You may resume use of your home prescribed mediations as previously directed following discharge from the hospital. If you were taking medications prior to your surgery and they are not listed on your discharge homegoing instructions medications list, consult your MD before you resume these medications.    Some postoperative pain is not unusual. This is usually relieved by taking prescribed or over the counter Acetaminophen/Tylenol, Motrin/ibuprofen. In cases of severe pain, you may use prescribed celebrex, gabapentin and tylenol as directed. Severe pain despite administration of pain medication must be reported to your physician.    Remember when taking Acetaminophen, do NOT exceed more than 1000 milligrams (mg) per dose or more than 4000 mg total per day. Taking too much Acetaminophen at one time can damage your liver. The maximum amount of ibuprofen in adults is 800 mg per dose or 3200 mg per day. Call your MD if you have any questions about your medications. To prevent constipation while taking narcotic pain medications, please utilize your prescribed bowel regimen, ensure that you drink plenty of water, eat fiber rich foods (a good source is fruit) and increase activity progressively.    DO NOT drive a car while utilizing narcotic pain medications and until cleared by MD at follow-up appointment. Driving or operating heavy machinery, lawnmowers or power tools while taking opiod/narcotic pain medications may impair your judgement.    Call Physician If:  Call your MD or seek immediate medical attention if you experience any of the following symptoms:  1. Fever of 101.5 (38.5 C) or greater  2. Pain not controlled with prescribed pain medications  3. Uncontrolled nausea and/or vomiting  4. Drainage or swelling around your incisions and/or surgical sites   5. Separation of incisions, or tearing of the incision line  6. Large fluid collection under or around the incision or flap  sites   7. Flap discoloration (including darkened appearance)  8. Difficulty breathing  9. Swelling, pain, heat and/or redness in your legs and/or calves  10. Inability to tolerate diet/fluid intake    Contact the plastic surgery office for any questions and/or concerns regarding the surgical incision/site.  1. [phone] if Monday-Friday (8 a.m. - 4:30 p.m.)  2. [phone] and ask for the Plastic Surgeon matty if after hours or on weekends    Follow-up/Post Discharge Appointments:  Follow-up care is a key part of your treatment and safety. It is very important that you maintain follow-up care as directed so that your surgical site heals properly and does not lead to problems. Always carry a current medication list with you and bring it to ALL healthcare Provider visits. Be sure to maintain follow up with plastic surgery at your scheduled appointment. If you are unable to keep your appointment, or need to reschedule please contact our office at [phone].        May have Tylenol after 2PM.    Scopalamine patch may stay on for 72 hours.  Remove patch, discard away from pets and children.  Do not touch eyes.  WASH HANDS THOROUGHLY!!!

## 2025-05-21 NOTE — ANESTHESIA PROCEDURE NOTES
Airway  Date/Time: 5/21/2025 7:45 AM  Reason: elective    Airway not difficult    Staffing  Performed: CRNA   Authorized by: German Vasquez MD    Performed by: CARMELITA Lerma-JASON  Patient location during procedure: OR    Patient Condition  Indications for airway management: anesthesia  Patient position: sniffing  MILS maintained throughout  Planned trial extubation  Sedation level: deep     Final Airway Details   Preoxygenated: yes  Final airway type: supraglottic airway  Successful airway: Supraglottic airway: igel.  Size: 4   Ventilation between attempts: none  Number of attempts at approach: 1  Number of other approaches attempted: 0    Additional Comments  Lips and teeth in preanesthetic condition

## 2025-05-24 NOTE — OP NOTE
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  The Surgical Assistant was necessary to assist due to the nature of the case and difficulty.  Specifically, the ISAURO was assisted with soft tissue handling, retraction, hemostasis and closure in order to successfully perform and complete the procedure.    Staff:   Surgical Assistant:   Scrub Person: Jaimie  Circulator: Juanita Mayer Circulator: Lyubov Mayer Scrub: Ruchi    Anesthesia Staff: Anesthesiologist: German Vasquez MD  CRNA: CARMELITA Lerma-CRNA    Procedure Summary  Anesthesia: General  ASA: III  Estimated Blood Loss: 25 mL  Intra-op Medications:   Administrations occurring from 0715 to 1045 on 25:   Medication Name Total Dose   bupivacaine liposome (Exparel) 1.3 % (13.3 mg/mL) 20 mL, bupivacaine PF (Marcaine) 0.5 % (5 mg/mL) 20 mL, sodium chloride (PF) 0.9% 20 mL syringe 120 mL   acetaminophen (Ofirmev) injection 1,000 mg   aprepitant (Emend) capsule 40 mg   ceFAZolin (Ancef) vial 1 g 2 g   dexAMETHasone (Decadron) 4 mg/mL IV Syringe 2 mL 6 mg    fentaNYL (Sublimaze) injection 50 mcg/mL 100 mcg   HYDROmorphone (Dilaudid) injection 1 mg/mL 1 mg   lactated Ringer's infusion Cannot be calculated   lidocaine (cardiac) injection 2% prefilled syringe 100 mg   methocarbamol (Robaxin) injection 1,000 mg   midazolam (Versed) injection 1 mg/mL 2 mg   phenylephrine 40 mcg/mL syringe 10 mL 240 mcg   propofol (Diprivan) injection 10 mg/mL 931.9 mg   scopolamine (Transderm-Scop) 1 mg/3 days patch 1 patch              Anesthesia Record               Intraprocedure I/O Totals          Output    Est. Blood Loss 25 mL    Total Output 25 mL          Specimen: No specimens collected              Drains and/or Catheters:   Closed/Suction Drain Lateral RUQ Bulb (Active)   Dressing Status Clean;Dry;Occlusive 05/21/25 1314   Drainage Appearance Dark red 05/21/25 1314   Status To bulb suction 05/21/25 1314   Output (mL) 30 mL 05/21/25 1431     Implants:  Implants       Type Name Action Serial No.       MENTOR MEMORY GEL BREAST IMPLANT- SMOOTH ROUND MODERATE CLASSIC PROFILE 235 cc Implanted [id number]              Indications: Iris Barth is an 44 y.o. female who is having surgery for Breast asymmetry following reconstructive surgery [N65.1]. She has a history of bilateral skin sparing mastectomies and prepectoral tissue expanders.  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  She has had her left tissue expander exchanged for an implant and the right chest wall fat grafted twice in order to increase the subcutaneous tissues to allow for expansion of the right chest wall to create a breast footprint.  She has also had recurrent ptosis of the left reconstruction, resulting implant malposition.  Today she will be getting a revision lf the left breast reconstruction to keep the implant in the appropriate footprint.   Additional she will be getting a tissue expander placed on the right side.  She did request an attempt to place an implant rather than a tissue expander if possible, due to the post operative discomfort from the firmness of a tissue expander.      The patient was seen in the preoperative area. The risks, benefits, complications, treatment options, non-operative alternatives, expected recovery and outcomes were discussed with the patient.  The risks of the procedure were discussed with her prior to surgery.  These include but are not limited to the risks of anesthesia, infection, bleeding, pain, need for further procedures, hematoma, seroma, wound healing complications, and asymmetry.   The possibilities of reaction to medication, pulmonary aspiration, injury to surrounding structures, bleeding, recurrent infection, the need for additional procedures, failure to diagnose a condition, and creating a complication requiring transfusion or operation were discussed with the patient. The patient concurred with the proposed plan, giving informed consent.  The site of surgery was properly noted/marked if necessary per policy. The patient has been actively warmed in preoperative area. Preoperative antibiotics have been ordered and given within 1 hours of incision.  Venous thrombosis prophylaxis have been ordered including bilateral sequential compression devices    Procedure Details: The patient was identified and marked in the upright and standing position.  She was taken to the operative room and placed in the supine position.  A preoperative time was performed identifying the patient, procedeure and laterality.  General anesthesia and an atraumatic LMA was initiated by the anesthesia team.  Her arms were padded and carefully secured to the arm boards, abducted less than 90 degrees. She was prepped and draped in the usual sterile fashion.    I started on the left breast first.  I sat the patient upright. The implant sat too  far inferiorly and laterally.  I marked the transverse excess around the vertical incision (9 cm x 4 cm) and tailor tacked the vertical excess along the lateral inframammary fold (11 cm x 2 cm).  I then marked and sharply de-epithelialized the vertical portion.  The inferior lateral marking was both de-epithelialized as well as removing the dermis.  I displaced the implant superiorly and medially to protected it.  I dissected through the subcutaneous tissue to elevated the latearal fold and secured a new fold in multiple layers with 2-0 Strattafix to the chest wall and in the subcutaneous tissues.  The incisions were closed with interrupted 3-0 Monocryl buried dermals and a running 4-0 Monocryl subcuticular.      I next turn my attention to the left breast.  Using the left as a reference, I drew the inframammary fold for the right side to match the left.    I made the lateral aspect of her previous incision which approximated the lateral inframammary fold.  I dissected down through the subcutaneous tissue to the chest wall and carried the dissection superiorly and then medially to the desired footprint.  I then used a sizer of an implant that matched her left and placed it in the pocket.  Unexpected, due to the successful take of the fat grafting, the same size implant was too large.  However, it showed that the pocket was able to handle the volume, although the definition of the folds was effaced.   Given this, in combination with the patient's desires, I decided to place an implant rather than a tissue expander.      Now, I needed to defined the inframammary fold and allow for projection in order to optimize form and symmetry.  Specifically the medial IMF needed to be defined and modification of the pocket for projection was needed.  I carried the superior dissection almost to the level of the clavicle to recruit more vertical length.  In order to better define the medial fold, I needed to perform capsulorraphy.   I incised the medial portion of her previous incisions, which was not at the IMF, but approximately 4 cm higher.  I dissected through the subcutaneous tissue, and raised the inferior medial flap to the proposed IMF.  I then used interrupted 2-0 Vicryls to resuspend and define the medial fold.  The subcutaneous level was thick and not very pliable and it was difficult to create good definition.  Further more, there was insufficient projection.  I decided to re-raised the inferior lateral and inferior medial flaps to advanced them superiorly and central to create adequate projection.  I tried this on with both a 235 ml moderate classic and 300 ml moderate plus sizers.  There was insufficient coverage in the inferior medial pole when using the 300 ml moderate plus sizer.  The closure was improved with 235 ml moderate classic sizer, but still lacking sufficient projection, definition of fold and symmetry.  I then divided the inferior medial flap at the level of where the medial IMF should be to both define the fold and allow for more degrees of freedom for the flap advancement.  I secured the flap along the medial inframammary fold with 2-0 Vicryls to the chest wall.  I advanced the lateral flap centrally to create the inferior lateral pole, position, and fold.  This was secured using running 2-0 Strattafix to the chest wall and soft tissue.  I placed 19 Italian Seamus drain was placed      20 ml of Exparel was diluted with 30 ml of 0.5% plain marcaine and 60 ml of NS which was used to place bilateral chest wall blocks.  I removed the sizer.  I obtained hemostasis and irrigated and soaked the pocket with Irrisept.  I closed incision with 2-0 Vicryl in the subcutaneous layer, followed by 3-0 Monocryls as interrupted buried dermals, leaving the central vertical portion of the incision opened.  I changed gloves, prepped the site with Irrisept and draped the site with sterile, blue towels.   I opened a 235 ml moderate classic  gel implant (Capay smooth round), bathed it in Irrisept and placed it into the pocket with a huddleston funnel.  While this implant volume was not quite a symmetric match, it allowed for direct to implant reconstruction while optimizing healing.   I completed closing the incisions in the same manner, followed by a running 4-0 Monocryl subcuticular.  A CHG tegaderm dressing was placed over the drain.  The incisions were dressed with mupirocin, xeroform, kerlix, ABD's, EpiFoam and a surgical support bra.  The patient was woken up, the LMA was removed, and she was taken to the recovery room in good condition.    Evidence of Infection: No   Complications:  None; patient tolerated the procedure well.    Disposition: PACU - hemodynamically stable.  Condition: stable       Attending Attestation: I performed the procedure.    Ayah Edgar  Phone Number: [phone]

## 2025-05-28 NOTE — PROGRESS NOTES
PLASTIC SURGERY CLINIC VISIT  POSTOP BREAST RECONSTRUCTION     Date: 5/29/25  Date of Surgery: 5/21/25  Surgical Procedure: Left Breast Reconstruction Revision; Right Breast Reconstruction with Implant         HPI:   Iris Barth 44 y.o. female is here for post-operative appointment for the above procedure(s).      Interval changes as of this date:   5/29 Doing well overall. Surgical bra and foam in place. DIANNA drain with SS output. She endorses adequate protein intake and activity restrictions. She is here today drain removal evaluation        MEDICATIONS  Current Medications[1]      OBJECTIVE [x]Expand by Default  There were no vitals taken for this visit.     REVIEW OF SYSTEMS:    Constitutional: negative for fevers, chills, unintentional weight loss  HEENT: negative for changes in vision, headaches, changes in hearing, congestion, sore throat  Cardiovascular: negative for chest pain, palpitations  Respiratory: negative for cough, shortness of breath  Gastrointestinal: negative for nausea, vomiting, diarrhea  Genitourinary: negative for dysuria, hematuria  Musculoskeletal: negative for joint swelling or pain  Skin: negative for rashes or lesions  Psych: negative for depression, anxiety  Endocrine: negative for polyuria, polydipsia, cold/heat intolerance  Hem/Lymph: negative for bleeding disorder     PHYSICAL EXAM  General: alert and oriented, no apparent distress    Focused exam of the breasts:  Right: ***  Left: ***      ASSESSMENT/PLAN  Iris Barth 44 y.o. female who had Left Breast Reconstruction Revision; Right Breast Reconstruction with Implant  on 5/21/25 who presents for POV.    Surgical bra and foam in place. Removed at this visit.    DIANNA drain(s) in place. No erythema or edema surrounding the drain site. There is serous output from the drain. Patient recorded output of the drains showed 2 consecutive days of less than 30cc output at time of removal. Patient was educated on purpose of  surgical drains and informed of risk for seroma post drain removal.       DIANNA drain(s) removed at this visit: ***     ***       RTC 6/3/25             [1]   Current Outpatient Medications:     cetirizine (ZyrTEC) 10 mg tablet, Take 1 tablet (10 mg) by mouth once daily as needed for allergies., Disp: , Rfl:     cholecalciferol (Vitamin D-3) 25 MCG (1000 UT) tablet, Take 1 tablet (25 mcg) by mouth once daily., Disp: , Rfl:     cyanocobalamin, vitamin B-12, (VITAMIN B-12 ORAL), Take 1,000 mcg by mouth once daily., Disp: , Rfl:     fluticasone (Flonase) 50 mcg/actuation nasal spray, Administer 1 spray into each nostril once daily as needed for rhinitis or allergies. Shake gently. Before first use, prime pump. After use, clean tip and replace cap., Disp: , Rfl:     gabapentin (Neurontin) 300 mg capsule, Take 1 capsule (300 mg) by mouth every 8 hours for 14 days., Disp: 42 capsule, Rfl: 0    ibuprofen-diphenhydramine cit (Advil PM) 200-38 mg tablet per tablet, Take 400 mg by mouth 1 time., Disp: , Rfl:     LORazepam (Ativan) 0.5 mg tablet, Take 2 tablets (1 mg) by mouth 3 times a day as needed (anxiety)., Disp: , Rfl:     multivitamin-children's (Cerovite, Jr) chewable tablet, Chew 2 tablets once daily. Flintstones chewable, Disp: , Rfl:     semaglutide, weight loss, (Wegovy) 2.4 mg/0.75 mL pen injector, Inject 2.4 mg under the skin every 7 days., Disp: 9 mL, Rfl: 3    tamoxifen (Nolvadex) 20 mg tablet, Take 1 tablet (20 mg total) by mouth once daily.  Take with water or any other nonalcoholic drink with or without food at around the same time(s) every day., Disp: 90 tablet, Rfl: 3    zinc acetate 50 mg (zinc) capsule, Take 1 tablet by mouth once daily., Disp: , Rfl:      Rfl:     gabapentin (Neurontin) 300 mg capsule, Take 1 capsule (300 mg) by mouth every 8 hours for 14 days., Disp: 42 capsule, Rfl: 0    ibuprofen-diphenhydramine cit (Advil PM) 200-38 mg tablet per tablet, Take 400 mg by mouth 1 time., Disp: , Rfl:     LORazepam (Ativan) 0.5 mg tablet, Take 2 tablets (1 mg) by mouth 3 times a day as needed (anxiety)., Disp: , Rfl:     multivitamin-children's (Cerovite, Jr) chewable tablet, Chew 2 tablets once daily. Flintstones chewable, Disp: , Rfl:     semaglutide, weight loss, (Wegovy) 2.4 mg/0.75 mL pen injector, Inject 2.4 mg under the skin every 7 days., Disp: 9 mL, Rfl: 3    tamoxifen (Nolvadex) 20 mg tablet, Take 1 tablet (20 mg total) by mouth once daily.  Take with water or any other nonalcoholic drink with or without food at around the same time(s) every day., Disp: 90 tablet, Rfl: 3    zinc acetate 50 mg (zinc) capsule, Take 1 tablet by mouth once daily., Disp: , Rfl:

## 2025-05-29 ENCOUNTER — OFFICE VISIT (OUTPATIENT)
Dept: PLASTIC SURGERY | Facility: CLINIC | Age: 45
End: 2025-05-29
Payer: COMMERCIAL

## 2025-05-29 VITALS
HEART RATE: 87 BPM | DIASTOLIC BLOOD PRESSURE: 93 MMHG | BODY MASS INDEX: 30.53 KG/M2 | HEIGHT: 66 IN | SYSTOLIC BLOOD PRESSURE: 129 MMHG | WEIGHT: 190 LBS

## 2025-05-29 DIAGNOSIS — N65.1 BREAST ASYMMETRY FOLLOWING RECONSTRUCTIVE SURGERY: Primary | ICD-10-CM

## 2025-06-02 NOTE — PROGRESS NOTES
"       PLASTIC SURGERY CLINIC VISIT  POSTOP BREAST RECONSTRUCTION     Date: 6/3/25  Date of Surgery: 5/21/25  Surgical Procedure: Left Breast Reconstruction Revision; Right Breast Reconstruction with Implant         HPI:   Iris Barth 44 y.o. female is here for post-operative appointment for the above procedure(s).      Interval changes as of this date:   5/29 Doing well overall. Surgical bra and foam in place. DIANNA drain with SS output. She endorses adequate protein intake and activity restrictions. She is here today drain removal evaluation   6/3 overall she has been doing well.  She does endorse some pain along the left lateral inframammary fold.  She feels the right is okay.       MEDICATIONS  Current Medications[1]      OBJECTIVE [x]Expand by Default  Blood pressure 125/88, pulse 97, height 1.676 m (5' 6\"), weight 86.2 kg (190 lb).     REVIEW OF SYSTEMS:    Constitutional: negative for fevers, chills, unintentional weight loss  HEENT: negative for changes in vision, headaches, changes in hearing, congestion, sore throat  Cardiovascular: negative for chest pain, palpitations  Respiratory: negative for cough, shortness of breath  Gastrointestinal: negative for nausea, vomiting, diarrhea  Genitourinary: negative for dysuria, hematuria  Musculoskeletal: negative for joint swelling or pain  Skin: negative for rashes or lesions  Psych: negative for depression, anxiety  Endocrine: negative for polyuria, polydipsia, cold/heat intolerance  Hem/Lymph: negative for bleeding disorder     PHYSICAL EXAM  General: alert and oriented, no apparent distress    Focused exam of the breasts:  Right: Incision c/d/I, healing well   Left: Incision c/d/I, healing well     Multiple suture tails extruding through the incisions on both sides were removed.  Steri-Strips were applied.  Aquacel placed over left lateral suture tail that had created some skin erosion.    The inframammary folds are holding position on both sides.  However " on the left the medial inframammary fold is slightly lower.  Right reconstruction still smaller than the left.  ASSESSMENT/PLAN  Iris Barth 44 y.o. female who had Left Breast Reconstruction Revision; Right Breast Reconstruction with Implant  on 5/21/25 who presents for POV. She is doing well, healing appropriately surgically.        Continue activity restrictions.   Continue adequate protein intake.   Steri strips applied, advise to dry them using a hair dryer set on cool.      RTC 6/10/25    Scribe Attestation  By signing my name below, EMMY Semajclayton Ricardo, Scribe, attest that this documentation has been prepared under the direction and in the presence of Ayah Edgar MD. Verbal consent obtained from the patient.      Attending Attestation:  Ayah BOOTH MD, personal performed the history, exam, and decision making on this patient.               [1]   Current Outpatient Medications:     cetirizine (ZyrTEC) 10 mg tablet, Take 1 tablet (10 mg) by mouth once daily as needed for allergies., Disp: , Rfl:     cholecalciferol (Vitamin D-3) 25 MCG (1000 UT) tablet, Take 1 tablet (25 mcg) by mouth once daily., Disp: , Rfl:     cyanocobalamin, vitamin B-12, (VITAMIN B-12 ORAL), Take 1,000 mcg by mouth once daily., Disp: , Rfl:     fluticasone (Flonase) 50 mcg/actuation nasal spray, Administer 1 spray into each nostril once daily as needed for rhinitis or allergies. Shake gently. Before first use, prime pump. After use, clean tip and replace cap., Disp: , Rfl:     gabapentin (Neurontin) 300 mg capsule, Take 1 capsule (300 mg) by mouth every 8 hours for 14 days., Disp: 42 capsule, Rfl: 0    ibuprofen-diphenhydramine cit (Advil PM) 200-38 mg tablet per tablet, Take 400 mg by mouth 1 time., Disp: , Rfl:     LORazepam (Ativan) 0.5 mg tablet, Take 2 tablets (1 mg) by mouth 3 times a day as needed (anxiety)., Disp: , Rfl:     multivitamin-children's (Cerovite, Jr) chewable tablet, Chew 2 tablets once daily. Flintstones  chewable, Disp: , Rfl:     semaglutide, weight loss, (Wegovy) 2.4 mg/0.75 mL pen injector, Inject 2.4 mg under the skin every 7 days., Disp: 9 mL, Rfl: 3    tamoxifen (Nolvadex) 20 mg tablet, Take 1 tablet (20 mg total) by mouth once daily.  Take with water or any other nonalcoholic drink with or without food at around the same time(s) every day., Disp: 90 tablet, Rfl: 3    zinc acetate 50 mg (zinc) capsule, Take 1 tablet by mouth once daily., Disp: , Rfl:

## 2025-06-03 ENCOUNTER — APPOINTMENT (OUTPATIENT)
Dept: PLASTIC SURGERY | Facility: CLINIC | Age: 45
End: 2025-06-03
Payer: COMMERCIAL

## 2025-06-03 VITALS
HEIGHT: 66 IN | DIASTOLIC BLOOD PRESSURE: 88 MMHG | WEIGHT: 190 LBS | BODY MASS INDEX: 30.53 KG/M2 | HEART RATE: 97 BPM | SYSTOLIC BLOOD PRESSURE: 125 MMHG

## 2025-06-03 DIAGNOSIS — N65.1 BREAST ASYMMETRY FOLLOWING RECONSTRUCTIVE SURGERY: Primary | ICD-10-CM

## 2025-06-03 PROCEDURE — 99024 POSTOP FOLLOW-UP VISIT: CPT | Performed by: SURGERY

## 2025-06-03 PROCEDURE — 3008F BODY MASS INDEX DOCD: CPT | Performed by: SURGERY

## 2025-06-09 NOTE — PROGRESS NOTES
"       PLASTIC SURGERY CLINIC VISIT  POSTOP BREAST RECONSTRUCTION     Date: 6/10/25  Date of Surgery: 5/21/25  Surgical Procedure: Left Breast Reconstruction Revision; Right Breast Reconstruction with Implant         HPI:   Iris Barth 44 y.o. female is here for post-operative appointment for the above procedure(s).      Interval changes as of this date:   5/29 Doing well overall. Surgical bra and foam in place. DIANNA drain with SS output. She endorses adequate protein intake and activity restrictions. She is here today drain removal evaluation   6/3 overall she has been doing well.  She does endorse some pain along the left lateral inframammary fold.  She feels the right is okay.  6/10 Doing well overall.        MEDICATIONS  Current Medications[1]      OBJECTIVE [x]Expand by Default  Blood pressure 109/75, pulse 78, height 1.676 m (5' 6\"), weight 86.2 kg (190 lb).     REVIEW OF SYSTEMS:    Constitutional: negative for fevers, chills, unintentional weight loss  HEENT: negative for changes in vision, headaches, changes in hearing, congestion, sore throat  Cardiovascular: negative for chest pain, palpitations  Respiratory: negative for cough, shortness of breath  Gastrointestinal: negative for nausea, vomiting, diarrhea  Genitourinary: negative for dysuria, hematuria  Musculoskeletal: negative for joint swelling or pain  Skin: negative for rashes or lesions  Psych: negative for depression, anxiety  Endocrine: negative for polyuria, polydipsia, cold/heat intolerance  Hem/Lymph: negative for bleeding disorder     PHYSICAL EXAM  General: alert and oriented, no apparent distress    Focused exam of the breasts:  Right: Incision c/d/I, healing well   Left: Incision c/d/I, healing well    The inframammary folds are holding position on both sides.  However on the left the medial inframammary fold is slightly lower.  Right reconstruction still smaller than the left. Little bit of effacement on the left medial fold. "     ASSESSMENT/PLAN  Iris Barth 44 y.o. female who had Left Breast Reconstruction Revision; Right Breast Reconstruction with Implant  on 5/21/25 who presents for POV. She is doing well, healing appropriately surgically.        Continue activity restrictions.   Continue adequate protein intake.   Will continue to steri strips, advise to dry them using a hair dryer set on cool.   RTC in 2 weeks     Scribe Attestation  By signing my name below, I, Moraima Ricardo, Scribe, attest that this documentation has been prepared under the direction and in the presence of Ayah Edgar MD. Verbal consent obtained from the patient.            [1]   Current Outpatient Medications:     cetirizine (ZyrTEC) 10 mg tablet, Take 1 tablet (10 mg) by mouth once daily as needed for allergies., Disp: , Rfl:     cholecalciferol (Vitamin D-3) 25 MCG (1000 UT) tablet, Take 1 tablet (25 mcg) by mouth once daily., Disp: , Rfl:     cyanocobalamin, vitamin B-12, (VITAMIN B-12 ORAL), Take 1,000 mcg by mouth once daily., Disp: , Rfl:     fluticasone (Flonase) 50 mcg/actuation nasal spray, Administer 1 spray into each nostril once daily as needed for rhinitis or allergies. Shake gently. Before first use, prime pump. After use, clean tip and replace cap., Disp: , Rfl:     LORazepam (Ativan) 0.5 mg tablet, Take 2 tablets (1 mg) by mouth 3 times a day as needed (anxiety)., Disp: , Rfl:     multivitamin-children's (Cerovite, Jr) chewable tablet, Chew 2 tablets once daily. Flintstones chewable, Disp: , Rfl:     semaglutide, weight loss, (Wegovy) 2.4 mg/0.75 mL pen injector, Inject 2.4 mg under the skin every 7 days., Disp: 9 mL, Rfl: 3    tamoxifen (Nolvadex) 20 mg tablet, Take 1 tablet (20 mg total) by mouth once daily.  Take with water or any other nonalcoholic drink with or without food at around the same time(s) every day., Disp: 90 tablet, Rfl: 3    zinc acetate 50 mg (zinc) capsule, Take 1 tablet by mouth once daily., Disp: , Rfl:      gabapentin (Neurontin) 300 mg capsule, Take 1 capsule (300 mg) by mouth every 8 hours for 14 days., Disp: 42 capsule, Rfl: 0    ibuprofen-diphenhydramine cit (Advil PM) 200-38 mg tablet per tablet, Take 400 mg by mouth 1 time., Disp: , Rfl:

## 2025-06-10 ENCOUNTER — APPOINTMENT (OUTPATIENT)
Dept: PLASTIC SURGERY | Facility: CLINIC | Age: 45
End: 2025-06-10
Payer: COMMERCIAL

## 2025-06-10 VITALS
HEART RATE: 78 BPM | BODY MASS INDEX: 30.53 KG/M2 | SYSTOLIC BLOOD PRESSURE: 109 MMHG | DIASTOLIC BLOOD PRESSURE: 75 MMHG | HEIGHT: 66 IN | WEIGHT: 190 LBS

## 2025-06-10 DIAGNOSIS — N65.1 BREAST ASYMMETRY FOLLOWING RECONSTRUCTIVE SURGERY: Primary | ICD-10-CM

## 2025-06-10 PROCEDURE — 99024 POSTOP FOLLOW-UP VISIT: CPT | Performed by: SURGERY

## 2025-06-10 PROCEDURE — 3008F BODY MASS INDEX DOCD: CPT | Performed by: SURGERY

## 2025-06-10 PROCEDURE — 1036F TOBACCO NON-USER: CPT | Performed by: SURGERY

## 2025-06-22 NOTE — PROGRESS NOTES
"       PLASTIC SURGERY CLINIC VISIT  POSTOP BREAST RECONSTRUCTION     Date: 6/24/25  Date of Surgery: 5/21/25  Surgical Procedure: Left Breast Reconstruction Revision; Right Breast Reconstruction with Implant         HPI:   Iris Barth 44 y.o. female is here for post-operative appointment for the above procedure(s).      Interval changes as of this date:   5/29 Doing well overall. Surgical bra and foam in place. DIANNA drain with SS output. She endorses adequate protein intake and activity restrictions. She is here today drain removal evaluation   6/3 overall she has been doing well.  She does endorse some pain along the left lateral inframammary fold.  She feels the right is okay.  6/10 Doing well overall.   6/17 Continues to do well.   6/24 Doing well, healed nicely. Interested in discussing timing for her next revision surgery.      MEDICATIONS  Current Medications[1]      OBJECTIVE [x]Expand by Default  Blood pressure 120/86, pulse 94, height 1.676 m (5' 6\"), weight 86.2 kg (190 lb).     REVIEW OF SYSTEMS:    Constitutional: negative for fevers, chills, unintentional weight loss  HEENT: negative for changes in vision, headaches, changes in hearing, congestion, sore throat  Cardiovascular: negative for chest pain, palpitations  Respiratory: negative for cough, shortness of breath  Gastrointestinal: negative for nausea, vomiting, diarrhea  Genitourinary: negative for dysuria, hematuria  Musculoskeletal: negative for joint swelling or pain  Skin: negative for rashes or lesions  Psych: negative for depression, anxiety  Endocrine: negative for polyuria, polydipsia, cold/heat intolerance  Hem/Lymph: negative for bleeding disorder     PHYSICAL EXAM  General: alert and oriented, no apparent distress    Focused exam of the breasts:  Right: Incision c/d/I, healing well. Implant soft and mobile.   Left: Incision c/d/I, healing well. Implant soft and mobile, has dropped slightly.       ASSESSMENT/PLAN  Iris Barth " 44 y.o. female who had Left Breast Reconstruction Revision; Right Breast Reconstruction with Implant  on 5/21/25 who presents for POV. She is doing well and healing appropriately.    Ok to gradually increase activity. May start doing light exercise at 6 weeks, full return to exercise at 12 weeks   Continue adequate protein intake.   Will schedule her for revision on 12/17/25  Will pre op her on 10/23/25 and determine finalized surgical plan at that time based on how she has healed     Apple Salguero PA-C      [employer] Attestation  By signing my name below, Moraima BOOTH Jd Ricardo, attest that this documentation has been prepared under the direction and in the presence of Ayah Edgar MD. Verbal consent obtained from the patient.           [1]   Current Outpatient Medications:     cetirizine (ZyrTEC) 10 mg tablet, Take 1 tablet (10 mg) by mouth once daily as needed for allergies., Disp: , Rfl:     cholecalciferol (Vitamin D-3) 25 MCG (1000 UT) tablet, Take 1 tablet (25 mcg) by mouth once daily., Disp: , Rfl:     cyanocobalamin, vitamin B-12, (VITAMIN B-12 ORAL), Take 1,000 mcg by mouth once daily., Disp: , Rfl:     fluticasone (Flonase) 50 mcg/actuation nasal spray, Administer 1 spray into each nostril once daily as needed for rhinitis or allergies. Shake gently. Before first use, prime pump. After use, clean tip and replace cap., Disp: , Rfl:     gabapentin (Neurontin) 300 mg capsule, Take 1 capsule (300 mg) by mouth every 8 hours for 14 days., Disp: 42 capsule, Rfl: 0    ibuprofen-diphenhydramine cit (Advil PM) 200-38 mg tablet per tablet, Take 400 mg by mouth 1 time., Disp: , Rfl:     LORazepam (Ativan) 0.5 mg tablet, Take 2 tablets (1 mg) by mouth 3 times a day as needed (anxiety)., Disp: , Rfl:     multivitamin-children's (Cerovite, Jr) chewable tablet, Chew 2 tablets once daily. Flintstones chewable, Disp: , Rfl:     semaglutide, weight loss, (Wegovy) 2.4 mg/0.75 mL pen injector, Inject 2.4 mg under the  skin every 7 days., Disp: 9 mL, Rfl: 3    tamoxifen (Nolvadex) 20 mg tablet, Take 1 tablet (20 mg total) by mouth once daily.  Take with water or any other nonalcoholic drink with or without food at around the same time(s) every day., Disp: 90 tablet, Rfl: 3    zinc acetate 50 mg (zinc) capsule, Take 1 tablet by mouth once daily., Disp: , Rfl:

## 2025-06-24 ENCOUNTER — APPOINTMENT (OUTPATIENT)
Dept: PLASTIC SURGERY | Facility: CLINIC | Age: 45
End: 2025-06-24
Payer: COMMERCIAL

## 2025-06-24 VITALS
HEIGHT: 66 IN | SYSTOLIC BLOOD PRESSURE: 120 MMHG | BODY MASS INDEX: 30.53 KG/M2 | DIASTOLIC BLOOD PRESSURE: 86 MMHG | WEIGHT: 190 LBS | HEART RATE: 94 BPM

## 2025-06-24 DIAGNOSIS — N65.1 BREAST ASYMMETRY FOLLOWING RECONSTRUCTIVE SURGERY: Primary | ICD-10-CM

## 2025-06-24 PROCEDURE — 99214 OFFICE O/P EST MOD 30 MIN: CPT | Performed by: SURGERY

## 2025-06-26 RX ORDER — APREPITANT 40 MG/1
40 CAPSULE ORAL ONCE
OUTPATIENT
Start: 2025-06-26 | End: 2025-06-26

## 2025-06-26 RX ORDER — CEFAZOLIN SODIUM 2 G/50ML
2 SOLUTION INTRAVENOUS ONCE
OUTPATIENT
Start: 2025-06-26 | End: 2025-06-26

## 2025-06-26 RX ORDER — GABAPENTIN 300 MG/1
600 CAPSULE ORAL ONCE
OUTPATIENT
Start: 2025-06-26 | End: 2025-06-26

## 2025-06-26 RX ORDER — ACETAMINOPHEN 325 MG/1
975 TABLET ORAL ONCE
OUTPATIENT
Start: 2025-06-26 | End: 2025-06-26

## 2025-07-01 DIAGNOSIS — E66.811 CLASS 1 OBESITY WITHOUT SERIOUS COMORBIDITY WITH BODY MASS INDEX (BMI) OF 34.0 TO 34.9 IN ADULT, UNSPECIFIED OBESITY TYPE: ICD-10-CM

## 2025-07-01 RX ORDER — SEMAGLUTIDE 2.4 MG/.75ML
2.4 INJECTION, SOLUTION SUBCUTANEOUS
Qty: 9 ML | Refills: 0 | Status: SHIPPED | OUTPATIENT
Start: 2025-07-01

## 2025-07-15 ENCOUNTER — APPOINTMENT (OUTPATIENT)
Dept: PRIMARY CARE | Facility: CLINIC | Age: 45
End: 2025-07-15
Payer: COMMERCIAL

## 2025-07-15 ASSESSMENT — ENCOUNTER SYMPTOMS
FATIGUE: 0
ARTHRALGIAS: 0
DIZZINESS: 0
HEADACHES: 0
BACK PAIN: 0
PALPITATIONS: 0
FACIAL ASYMMETRY: 0
APPETITE CHANGE: 0
COLOR CHANGE: 0
ACTIVITY CHANGE: 0
COUGH: 0
CHOKING: 0
CHEST TIGHTNESS: 0
FEVER: 0
LIGHT-HEADEDNESS: 0

## 2025-07-15 NOTE — PROGRESS NOTES
Subjective   Patient ID: Iris Barth is a 44 y.o. female who presents for Med Refill.    HPI   Patient reports she is here for refill of her Wegovy.  She explains that she had been doing well on this medication and losing weight. She has been doing some fast paced walking. She is having 120 grams of protein daily. She has really had difficulty with keeping her weight in check since her breast cancer surgery.     Review of Systems   Constitutional:  Negative for activity change, appetite change, fatigue and fever.   HENT:  Negative for congestion.    Respiratory:  Negative for cough, choking and chest tightness.    Cardiovascular:  Negative for chest pain, palpitations and leg swelling.   Musculoskeletal:  Negative for arthralgias, back pain and gait problem.   Skin:  Negative for color change and pallor.   Neurological:  Negative for dizziness, facial asymmetry, light-headedness and headaches.       Objective   /70 (BP Location: Right arm, Patient Position: Sitting)   Pulse 71   Wt 81 kg (178 lb 9.6 oz)   BMI 28.83 kg/m²   BSA Body surface area is 1.94 meters squared.      Physical Exam  Constitutional:       General: She is not in acute distress.     Appearance: Normal appearance. She is not toxic-appearing.   HENT:      Head: Normocephalic.      Right Ear: Tympanic membrane, ear canal and external ear normal.      Left Ear: Tympanic membrane, ear canal and external ear normal.     Eyes:      Conjunctiva/sclera: Conjunctivae normal.      Pupils: Pupils are equal, round, and reactive to light.       Cardiovascular:      Rate and Rhythm: Normal rate and regular rhythm.      Pulses: Normal pulses.      Heart sounds: Normal heart sounds.   Pulmonary:      Effort: No respiratory distress.      Breath sounds: No wheezing, rhonchi or rales.   Abdominal:      General: Bowel sounds are normal. There is no distension.      Palpations: Abdomen is soft.      Tenderness: There is no abdominal tenderness.      Musculoskeletal:         General: No swelling or tenderness.     Skin:     Findings: No lesion or rash.     Neurological:      General: No focal deficit present.      Mental Status: She is alert and oriented to person, place, and time. Mental status is at baseline.      Gait: Gait normal.     Psychiatric:         Mood and Affect: Mood normal.         Behavior: Behavior normal.         Thought Content: Thought content normal.         Judgment: Judgment normal.       Admission on 05/21/2025, Discharged on 05/21/2025   Component Date Value Ref Range Status    Preg Test, Ur 05/21/2025 Negative  Negative Final   Admission on 12/16/2024, Discharged on 12/16/2024   Component Date Value Ref Range Status    Preg Test, Ur 12/16/2024 Negative  Negative Final   Admission on 07/22/2024, Discharged on 07/22/2024   Component Date Value Ref Range Status    Preg Test, Ur 07/22/2024 Negative  Negative Final    Case Report 07/22/2024    Final                    Value:Surgical Pathology                                Case: [id number]                                  Authorizing Provider:  Ayah Edgar MD           Collected:           07/22/2024 0900              [facility] Received:            07/22/2024 1226              Pathologist:           Barrett Germain DO                                                          Specimen:    BREAST IMPLANT LEFT, LEFT BREAST TISSUE EXPANDER FOR GROSS ONLY                            FINAL DIAGNOSIS 07/22/2024    Final                    Value:A. BREAST IMPLANT LEFT:   Surgical hardware as described below, gross diagnosis only          07/22/2024    Final                    Value:By the signature on this report, the individual or group listed as making the Final Interpretation/Diagnosis certifies that they have reviewed this case.       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  Specimen is for gross examination only.  Photographs have been taken.       Current Outpatient Medications on File Prior to Visit   Medication Sig Dispense Refill    azelaic acid (Finacea) 15 % gel APPLY THIN LAYER TO THE FULL FACE NIGHTLY      cetirizine (ZyrTEC) 10 mg tablet Take 1 tablet (10 mg) by mouth once daily as needed for allergies.      cholecalciferol (Vitamin D-3) 25 MCG (1000 UT) tablet Take 1 tablet (25 mcg) by mouth once daily.      cyanocobalamin, vitamin B-12, (VITAMIN B-12 ORAL) Take 1,000 mcg by mouth once daily.      fluticasone (Flonase) 50 mcg/actuation nasal spray Administer 1 spray into each nostril once daily as needed for rhinitis or allergies. Shake gently. Before first use, prime pump. After use, clean tip and replace cap.      ibuprofen-diphenhydramine cit (Advil PM) 200-38 mg tablet per tablet Take 400 mg by mouth 1 time.      LORazepam (Ativan) 0.5 mg tablet Take 2 tablets (1 mg) by mouth 3 times a day as needed (anxiety).      multivitamin-children's (Cerovite, Jr) chewable tablet Chew and swallow 2 tablets once daily. Flintstones chewable      mupirocin (Bactroban) 2 % ointment MIX 50/50 WITH TRIAMCINOLONE 0.025% OINTMENT AND APPLY TO THE AFFECTED AREA OF LIPS TWICE DAILY      semaglutide, weight loss, (Wegovy) 2.4 mg/0.75 mL pen injector Inject 2.4 mg under the skin every 7 days. 9 mL 0    tamoxifen (Nolvadex) 20 mg tablet Take 1 tablet (20 mg total) by mouth once daily.  Take with water or any other nonalcoholic drink with or without food at around the same time(s) " every day. 90 tablet 3    triamcinolone (Kenalog) 0.025 % ointment MIX 50/50 WITH MUPIROCIN OINTMENT AND APPLY TO THE AFFECTED AREA OF THE LIPS TWICE DAILY 2 WEEKS, TAKING A WEEK OFF AND CONTINUING AS NEEDED      zinc acetate 50 mg (zinc) capsule Take 1 tablet by mouth once daily.      [DISCONTINUED] gabapentin (Neurontin) 300 mg capsule Take 1 capsule (300 mg) by mouth every 8 hours for 14 days. 42 capsule 0     No current facility-administered medications on file prior to visit.     No images are attached to the encounter.            Assessment/Plan   Diagnoses and all orders for this visit:  BMI 28.0-28.9,adult      Patient will continue on her wegovy, is benefiting from this medication  Patient to call if questions or concerns

## 2025-07-16 ENCOUNTER — APPOINTMENT (OUTPATIENT)
Dept: PRIMARY CARE | Facility: CLINIC | Age: 45
End: 2025-07-16
Payer: COMMERCIAL

## 2025-07-16 VITALS
SYSTOLIC BLOOD PRESSURE: 108 MMHG | HEART RATE: 71 BPM | DIASTOLIC BLOOD PRESSURE: 70 MMHG | WEIGHT: 178.6 LBS | BODY MASS INDEX: 28.83 KG/M2

## 2025-07-16 DIAGNOSIS — Z12.11 SCREEN FOR COLON CANCER: ICD-10-CM

## 2025-07-16 DIAGNOSIS — E66.811 CLASS 1 OBESITY WITHOUT SERIOUS COMORBIDITY WITH BODY MASS INDEX (BMI) OF 34.0 TO 34.9 IN ADULT, UNSPECIFIED OBESITY TYPE: ICD-10-CM

## 2025-07-16 PROCEDURE — 99214 OFFICE O/P EST MOD 30 MIN: CPT | Performed by: FAMILY MEDICINE

## 2025-07-16 PROCEDURE — 1036F TOBACCO NON-USER: CPT | Performed by: FAMILY MEDICINE

## 2025-07-16 RX ORDER — SEMAGLUTIDE 2.4 MG/.75ML
2.4 INJECTION, SOLUTION SUBCUTANEOUS
Qty: 21 ML | Refills: 3 | Status: SHIPPED | OUTPATIENT
Start: 2025-07-16

## 2025-07-16 RX ORDER — MUPIROCIN 20 MG/G
OINTMENT TOPICAL
COMMUNITY
Start: 2025-01-09

## 2025-07-16 RX ORDER — AZELAIC ACID 0.15 G/G
GEL TOPICAL
COMMUNITY
Start: 2025-01-09

## 2025-07-16 RX ORDER — TRIAMCINOLONE ACETONIDE 0.25 MG/G
OINTMENT TOPICAL
COMMUNITY
Start: 2025-01-09

## 2025-07-16 ASSESSMENT — ENCOUNTER SYMPTOMS
LOSS OF SENSATION IN FEET: 0
OCCASIONAL FEELINGS OF UNSTEADINESS: 0
DEPRESSION: 0

## 2025-07-16 ASSESSMENT — PATIENT HEALTH QUESTIONNAIRE - PHQ9
1. LITTLE INTEREST OR PLEASURE IN DOING THINGS: NOT AT ALL
2. FEELING DOWN, DEPRESSED OR HOPELESS: NOT AT ALL
SUM OF ALL RESPONSES TO PHQ9 QUESTIONS 1 AND 2: 0

## 2025-08-07 LAB — NONINV COLON CA DNA+OCC BLD SCRN STL QL: NEGATIVE

## 2025-12-08 ENCOUNTER — APPOINTMENT (OUTPATIENT)
Dept: PRIMARY CARE | Facility: CLINIC | Age: 45
End: 2025-12-08
Payer: COMMERCIAL

## (undated) DEVICE — COVER, BACK TABLE, STERILE-Z

## (undated) DEVICE — DRESSING, TEGADERM, CHG, 3.5 X 4.5 IN

## (undated) DEVICE — MANIFOLD, 4 PORT NEPTUNE STANDARD

## (undated) DEVICE — ELECTRODE, ELECTROSURGICAL, BLADE, STANDARD, 2.75 IN

## (undated) DEVICE — SUTURE, MONOCRYL, 3-0, 18 IN, PS2, UNDYED

## (undated) DEVICE — POSITIONING KIT, PAGAZZI, PINK PAD XL, W/ ARM AND HEAD REST

## (undated) DEVICE — SUTURE, PROLENE, 2-0, 18 IN, FS, BLUE

## (undated) DEVICE — WOUND CARE, MEPITEL ONE, 3 X 4, 7.5 X 10CM

## (undated) DEVICE — EVACUATOR, WOUND, SUCTION, CLOSED, JACKSON-PRATT, 100 CC, SILICONE

## (undated) DEVICE — GLOVE, SURGICAL, PROTEXIS PI BLUE W/NEUTHERA, 6.5, PF, LF

## (undated) DEVICE — SUTURE, VICRYL 0, TAPER POINT, CT-1 VIOLET 27 INCH

## (undated) DEVICE — PAD, DERMAPROX, 8 X 11 X 1/2 IN, WHITE

## (undated) DEVICE — SUTURE, MONOCRYL PLUS, 3-0 1X27INCH, PS-2 UNDYED

## (undated) DEVICE — Device

## (undated) DEVICE — BANDAGE, GAUZE, 6 PLY, KERLIX, 3.5 IN X 3.5 YD, STERILE

## (undated) DEVICE — SYRINGE, 50 CC, LUER LOCK

## (undated) DEVICE — TUBING SET, SOFTOUCH PUMP, SINGLE SPIKE

## (undated) DEVICE — DRAPE, SHEET, THREE QUARTER, FAN FOLD, 57 X 77 IN

## (undated) DEVICE — TOWEL, SURGICAL, NEURO, O/R, 16 X 26, BLUE, STERILE

## (undated) DEVICE — SUTURE, VICRYL, 2-0, 36 IN, CT-1, UNDYED

## (undated) DEVICE — DRAPE, INCISE, ANTIMICROBIAL, IOBAN 2, STERI DRAPE, 23 X 33 IN, DISPOSABLE, STERILE

## (undated) DEVICE — SUTURE, VICRYL 2-0, TAPER POINT, CT-1 UNDYED 27 INCH

## (undated) DEVICE — YANKAUER, RIGID, STRAIGHT, OPEN TIP, NO VENT

## (undated) DEVICE — DRESSING, GAUZE, PETROLATUM, XEROFORM, 5 X 9 IN, STERILE

## (undated) DEVICE — NEEDLE, HYPODERMIC, MONOJECT, 27 G X 1.5 IN

## (undated) DEVICE — MARKER, SURGICAL, SKIN, REG TIP, W/ RULER & LABELS

## (undated) DEVICE — DRESSING, TRANSPARENT, TEGADERM, 4 X 4-3/4 IN, NO LABEL

## (undated) DEVICE — SUTURE, ETHILON, 3/0, FS1, 18 IN, BLK MONO

## (undated) DEVICE — APPLICATOR, CHLORAPREP, W/ORANGE TINT, 26ML

## (undated) DEVICE — APPLICATOR, COTTON TIP, 6 IN, 2PK, STERILE

## (undated) DEVICE — ELECTRODE, ELECTROSURGICAL, BLADE EXT 4 INCH, INSULATED

## (undated) DEVICE — SPONGE, LAP, XRAY DECT, 18IN X 18IN, W/MASTER DMT, STERILE

## (undated) DEVICE — ELECTRODE, ELECTROSURGICAL, BLADE, INSULATED, ENT/IMA, STERILE

## (undated) DEVICE — KIT, PREVENA RESTOR BELLA FORM, MED, 24X22

## (undated) DEVICE — BAG, DECANTER

## (undated) DEVICE — NEEDLE, HYPODERMIC, 25 G X 1.5 IN, A BEVEL, STERILE

## (undated) DEVICE — SOLUTION, IRRIGATION, SODIUM CHLORIDE 0.9%, 1000 ML, POUR BOTTLE

## (undated) DEVICE — NEEDLE, HYPODERMIC, 23 GA X 1.5 IN

## (undated) DEVICE — STOPCOCK, 4 WAY, SMALL BODY, W/SWIVEL, ULTRA, LIPD RESISTANT, LUER LOCK, MALE, LF

## (undated) DEVICE — SUTURE, PROLENE, 5-0, 18 IN, PS3, BLUE

## (undated) DEVICE — COVER, CART, 45 X 27 X 48 IN, CLEAR

## (undated) DEVICE — PAD, GROUNDING, ELECTROSURGICAL, W/9 FT CABLE, POLYHESIVE II, ADULT, LF

## (undated) DEVICE — DRESSING, ABDOMINAL, TENDERSORB, 8 X 7-1/2 IN, STERILE

## (undated) DEVICE — TUBING, SUCTION, CONNECTING, NON-CONDUCTIVE, W/CONNECTOR, 6 FT

## (undated) DEVICE — COVER, BACK TABLE, 65 X 90, HVY REINFORCED

## (undated) DEVICE — DRAPE, INCISE, ANTIMICROBIAL, IOBAN 2, LARGE, 17 X 23 IN, DISPOSABLE, STERILE

## (undated) DEVICE — SYRINGE, 60 CC, LUER LOCK, MONOJECT

## (undated) DEVICE — SUTURE, SILK, 2-0, 30 IN, SH, BLACK

## (undated) DEVICE — STOPCOCK, 3 WAY, FEMALE/MALE LUER LOCK

## (undated) DEVICE — NEEDLE, HYPODERMIC, REGULAR WALL, REGULAR BEVEL, 20 G X 1.5 IN

## (undated) DEVICE — SOLUTION, INJECTION, USP, NACL, SODIUM CHLORIDE 0.45%, 1000 ML, BAG

## (undated) DEVICE — DRESSING, GAUZE, SUPER KERLIX, 6X6

## (undated) DEVICE — SUTURE, MONOCRYL, 4-0, 18 IN, PS2, UNDYED

## (undated) DEVICE — SYRINGE, 10 CC, LUER LOCK

## (undated) DEVICE — DRESSING, ANTIMICROBIAL, W/7 MM CENTER HOLE, W/CHLORHEXIDINE GLUCONATE, BIOPATCH, 1 IN DISK

## (undated) DEVICE — ELECTRODE, ELECTROSURGICAL, BLADE, NONSTICK, MODIFIED, 6.5 IN X 165 MM

## (undated) DEVICE — ELECTRODE, ELECTROSURGICAL, PENCIL, HAND CONTROL, BLADE, W/HOLSTER, 10 FT CORD, LF

## (undated) DEVICE — DRAPE, SHEET, UTILITY, NON ABSORBENT, 18 X 26 IN, LF

## (undated) DEVICE — 30MM X 135MM ONETRAC LX CORDLESS RETRACTOR, W/INTEGRATED MULTI-LED

## (undated) DEVICE — WOUND SYSTEM, DEBRIDEMENT & CLEANING, O.R DUOPAK

## (undated) DEVICE — STAPLER, SKIN PROXIMATE, 35 WIDE

## (undated) DEVICE — DRAPE, SHEET, FAN FOLDED, HALF, 44 X 58 IN, DISPOSABLE, LF, STERILE

## (undated) DEVICE — SPONGE, LAP, XRAY DECT, 18IN X 18IN, W/LOOP, STERILE

## (undated) DEVICE — TUBING SET, ASPIRATION

## (undated) DEVICE — PROBE, TRUNODE GAMMA

## (undated) DEVICE — STRIP, SKIN CLOSURE, STERI STRIP, REINFORCED, 0.5 X 4 IN

## (undated) DEVICE — DRESSING, GAUZE, FLUFF, 1 PLY, 18 X 36 IN

## (undated) DEVICE — ADHESIVE, SKIN, LIQUIBAND EXCEED

## (undated) DEVICE — DRAIN, CHANNEL W/TROCAR 15F

## (undated) DEVICE — CAUTERY, PENCIL, PUSH BUTTON, SMOKE EVAC, 70MM

## (undated) DEVICE — SUTURE, VICRYL 0, 36 IN, CT-1, VIOLET

## (undated) DEVICE — SUTURE, MONOCRYL, 3-0, 27 IN, PS-2, UNDYED

## (undated) DEVICE — BANDAGE, ELASTIC, ACE, ACE, DOUBLE LENGTH, 6 X 550 IN, LF

## (undated) DEVICE — SYRINGE, 60 CC, IRRIGATION, PISTON, CATH TIP, W/LUER ADAPTER,DISP

## (undated) DEVICE — TIP, SUCTION, YANKAUER, BULB, ADULT

## (undated) DEVICE — BINDER, ABDOMINAL, SMALL/MEDIUM, 9 X 30-45 IN

## (undated) DEVICE — NEEDLE, HYPODERMIC, 25 G X 2 IN

## (undated) DEVICE — SYRINGE, 60 CC, IRRIGATION, BULB, CONTRO-BULB, PAPER POUCH

## (undated) DEVICE — PHOTONGUIDE, WIDE/FLAT LIGHT CARRIER

## (undated) DEVICE — KIT, PREVENA RESTOR BELLA FORM, SM 21X19

## (undated) DEVICE — DRAPE, INSTRUMENT, W/POUCH, STERI DRAPE, 7 X 11 IN, DISPOSABLE, STERILE

## (undated) DEVICE — STRIP, SKIN CLOSURE, STERI STRIP, REINFORCED, 1 X 5 IN

## (undated) DEVICE — PUREGRAFT 850 SUCTION LIPOPLASTY SYSTEM, 1 PUREGRAFT 850 BAG, 1 PUREGRAFT 850 DRAIN BAG, 12 TISSUE ACCESS PORT ADAPTER (TAPA), 2 LUER LOCK SYRINGE ADAPTER

## (undated) DEVICE — PREP TRAY, SKIN, DRY, W/GLOVES

## (undated) DEVICE — BANDAGE, GAUZE, CONFORMING, KERLIX, 6 PLY, 4.5 IN X 4.1 YD

## (undated) DEVICE — DRESSING, GAUZE, KERLIX, 12 PLY, 4 X 4 IN, STERILE

## (undated) DEVICE — SUTURE, ETHILON, 2-0, 18 IN, FS, BLACK, BX/12

## (undated) DEVICE — SUTURE, SILK, 3-0, 30 IN, BR SH, BLACK

## (undated) DEVICE — SUTURE, ETHILON, 2-0, 18 IN, FS, BLACK, BX/36

## (undated) DEVICE — DRESSING, GAUZE, PETROLATUM, STRIP OVERWRAP, XEROFORM, 5 X 9 IN, STERILE

## (undated) DEVICE — APPLICATOR, FOAM BARRIER, LARGE

## (undated) DEVICE — TIP,  ELECTRODE COATED INSULATED, EXTENDED, LF

## (undated) DEVICE — REMOVER, STAPLE, PREMIUM

## (undated) DEVICE — APPLIER, LIGACLIP, MULTIPLE, SMALL 9-3/8IN

## (undated) DEVICE — APPLIER,  LIGACLIP MULTI CLIP, 30 MED 11 1/2

## (undated) DEVICE — COVER, MAYO STAND, W/PAD, 23 IN, DISPOSABLE, PLASTIC, LF, STERILE

## (undated) DEVICE — TIP, SUCTION, YANKAUER, FLEXIBLE

## (undated) DEVICE — SUTURE, MONOCRYL, 4-0, 27 IN, PS-2, UNDYED

## (undated) DEVICE — DRESSING, ABDOMINAL, TENDERSORB, 8 X 10 IN, STERILE

## (undated) DEVICE — CRADLE, ARM, FOAM

## (undated) DEVICE — DRESSING, TRANSPARENT, TEGADERM, 4 X 4-3/4 IN

## (undated) DEVICE — BANDAGE, ELASTIC, FLEXMASTER, 6 IN, DBL LENGTH, STERILE

## (undated) DEVICE — DRAPE, 136.5 X 84 IN, SPLIT W/ADH, REINFORCED

## (undated) DEVICE — SUTURE, PROLENE, 5-0, 18 IN, P3, BLUE

## (undated) DEVICE — SUTURE, PROLENE, 0, 30 IN, CT, BLUE

## (undated) DEVICE — MARKER, SKIN, DUAL TIP, W/RULER

## (undated) DEVICE — DRESSING, GAUZE, SPONGE, 12 PLY, CURITY, 4 X 4 IN, RIGID TRAY, STERILE

## (undated) DEVICE — CLIP, LIGATING, HORIZON, MEDIUM, TITANIUM

## (undated) DEVICE — STAPLER, SKIN, FIXED HEAD, WIDE, 35

## (undated) DEVICE — RETRACTOR, CORDLESS, RADIALUX, LIGHTED